# Patient Record
Sex: FEMALE | Race: WHITE
[De-identification: names, ages, dates, MRNs, and addresses within clinical notes are randomized per-mention and may not be internally consistent; named-entity substitution may affect disease eponyms.]

---

## 2018-02-13 ENCOUNTER — HOSPITAL ENCOUNTER (EMERGENCY)
Dept: HOSPITAL 47 - EC | Age: 53
Discharge: HOME | End: 2018-02-13
Payer: COMMERCIAL

## 2018-02-13 VITALS — RESPIRATION RATE: 16 BRPM | DIASTOLIC BLOOD PRESSURE: 67 MMHG | HEART RATE: 94 BPM | SYSTOLIC BLOOD PRESSURE: 158 MMHG

## 2018-02-13 VITALS — TEMPERATURE: 98.2 F

## 2018-02-13 DIAGNOSIS — E11.9: ICD-10-CM

## 2018-02-13 DIAGNOSIS — Z79.899: ICD-10-CM

## 2018-02-13 DIAGNOSIS — Z87.891: ICD-10-CM

## 2018-02-13 DIAGNOSIS — Z79.4: ICD-10-CM

## 2018-02-13 DIAGNOSIS — I10: ICD-10-CM

## 2018-02-13 DIAGNOSIS — E78.5: ICD-10-CM

## 2018-02-13 DIAGNOSIS — Z88.2: ICD-10-CM

## 2018-02-13 DIAGNOSIS — M94.0: Primary | ICD-10-CM

## 2018-02-13 DIAGNOSIS — Z86.14: ICD-10-CM

## 2018-02-13 LAB
ALBUMIN SERPL-MCNC: 3.9 G/DL (ref 3.5–5)
ALP SERPL-CCNC: 129 U/L (ref 38–126)
ALT SERPL-CCNC: 27 U/L (ref 9–52)
ANION GAP SERPL CALC-SCNC: 12 MMOL/L
APTT BLD: 22.3 SEC (ref 22–30)
AST SERPL-CCNC: 28 U/L (ref 14–36)
BASOPHILS # BLD AUTO: 0.1 K/UL (ref 0–0.2)
BASOPHILS NFR BLD AUTO: 0 %
BUN SERPL-SCNC: 22 MG/DL (ref 7–17)
CALCIUM SPEC-MCNC: 10 MG/DL (ref 8.4–10.2)
CHLORIDE SERPL-SCNC: 96 MMOL/L (ref 98–107)
CK SERPL-CCNC: 28 U/L (ref 30–135)
CO2 SERPL-SCNC: 32 MMOL/L (ref 22–30)
EOSINOPHIL # BLD AUTO: 0.7 K/UL (ref 0–0.7)
EOSINOPHIL NFR BLD AUTO: 6 %
ERYTHROCYTE [DISTWIDTH] IN BLOOD BY AUTOMATED COUNT: 4.6 M/UL (ref 3.8–5.4)
ERYTHROCYTE [DISTWIDTH] IN BLOOD: 14.7 % (ref 11.5–15.5)
GLUCOSE SERPL-MCNC: 332 MG/DL (ref 74–99)
HCT VFR BLD AUTO: 38.9 % (ref 34–46)
HGB BLD-MCNC: 12 GM/DL (ref 11.4–16)
INR PPP: 1 (ref ?–1.2)
LYMPHOCYTES # SPEC AUTO: 2.4 K/UL (ref 1–4.8)
LYMPHOCYTES NFR SPEC AUTO: 23 %
MAGNESIUM SPEC-SCNC: 1.9 MG/DL (ref 1.6–2.3)
MCH RBC QN AUTO: 26.1 PG (ref 25–35)
MCHC RBC AUTO-ENTMCNC: 30.9 G/DL (ref 31–37)
MCV RBC AUTO: 84.5 FL (ref 80–100)
MONOCYTES # BLD AUTO: 0.6 K/UL (ref 0–1)
MONOCYTES NFR BLD AUTO: 6 %
NEUTROPHILS # BLD AUTO: 6.9 K/UL (ref 1.3–7.7)
NEUTROPHILS NFR BLD AUTO: 64 %
PLATELET # BLD AUTO: 452 K/UL (ref 150–450)
POTASSIUM SERPL-SCNC: 5.9 MMOL/L (ref 3.5–5.1)
PROT SERPL-MCNC: 7.1 G/DL (ref 6.3–8.2)
PT BLD: 9.5 SEC (ref 9–12)
SODIUM SERPL-SCNC: 140 MMOL/L (ref 137–145)
TROPONIN I SERPL-MCNC: <0.012 NG/ML (ref 0–0.03)
WBC # BLD AUTO: 10.8 K/UL (ref 3.8–10.6)

## 2018-02-13 PROCEDURE — 85025 COMPLETE CBC W/AUTO DIFF WBC: CPT

## 2018-02-13 PROCEDURE — 84484 ASSAY OF TROPONIN QUANT: CPT

## 2018-02-13 PROCEDURE — 93005 ELECTROCARDIOGRAM TRACING: CPT

## 2018-02-13 PROCEDURE — 71046 X-RAY EXAM CHEST 2 VIEWS: CPT

## 2018-02-13 PROCEDURE — 85610 PROTHROMBIN TIME: CPT

## 2018-02-13 PROCEDURE — 85730 THROMBOPLASTIN TIME PARTIAL: CPT

## 2018-02-13 PROCEDURE — 83735 ASSAY OF MAGNESIUM: CPT

## 2018-02-13 PROCEDURE — 99285 EMERGENCY DEPT VISIT HI MDM: CPT

## 2018-02-13 PROCEDURE — 82553 CREATINE MB FRACTION: CPT

## 2018-02-13 PROCEDURE — 36415 COLL VENOUS BLD VENIPUNCTURE: CPT

## 2018-02-13 PROCEDURE — 80053 COMPREHEN METABOLIC PANEL: CPT

## 2018-02-13 PROCEDURE — 82550 ASSAY OF CK (CPK): CPT

## 2018-02-13 NOTE — XR
EXAMINATION TYPE: XR chest 2V

 

DATE OF EXAM: 2/13/2018

 

COMPARISON: 2/25/2015

 

HISTORY: Chest pain

 

TECHNIQUE:  Frontal and lateral views of the chest are obtained.

 

FINDINGS:  There is consolidation in the left lower lobe with blunting of left costophrenic angle. Th
ere is slight blunting of right costophrenic angle. There is no gross heart failure. There are chest 
leads. Heart appears enlarged. There is cervical spine fusion surgery. There are chest leads.

 

IMPRESSION:  There is new left lower lobe pulmonary consolidation compared to old exam and consistent
 with pneumonia. New bilateral pleural effusions. Mild heart failure cannot be entirely excluded.

## 2020-09-03 ENCOUNTER — HOSPITAL ENCOUNTER (OUTPATIENT)
Dept: HOSPITAL 47 - LABWHC1 | Age: 55
Discharge: HOME | End: 2020-09-03
Payer: MEDICARE

## 2020-09-03 DIAGNOSIS — N19: ICD-10-CM

## 2020-09-03 DIAGNOSIS — K90.89: ICD-10-CM

## 2020-09-03 DIAGNOSIS — K74.1: ICD-10-CM

## 2020-09-03 DIAGNOSIS — E66.01: ICD-10-CM

## 2020-09-03 DIAGNOSIS — D50.8: ICD-10-CM

## 2020-09-03 DIAGNOSIS — K50.90: ICD-10-CM

## 2020-09-03 DIAGNOSIS — E89.1: Primary | ICD-10-CM

## 2020-09-03 DIAGNOSIS — E55.9: ICD-10-CM

## 2020-09-03 LAB
ALBUMIN SERPL-MCNC: 4.2 G/DL (ref 3.8–4.9)
ALBUMIN/GLOB SERPL: 1.83 G/DL (ref 1.6–3.17)
ALP SERPL-CCNC: 101 U/L (ref 41–126)
ALT SERPL-CCNC: 27 U/L (ref 8–44)
ANION GAP SERPL CALC-SCNC: 11.5 MMOL/L (ref 4–12)
APTT BLD: 26.9 SEC (ref 24.7–29.9)
AST SERPL-CCNC: 32 U/L (ref 13–35)
BUN SERPL-SCNC: 18 MG/DL (ref 9–27)
BUN/CREAT SERPL: 20 RATIO (ref 12–20)
CALCIUM SPEC-MCNC: 9.3 MG/DL (ref 8.7–10.3)
CHLORIDE SERPL-SCNC: 103 MMOL/L (ref 96–109)
CHOLEST SERPL-MCNC: 160 MG/DL (ref 0–200)
CO2 SERPL-SCNC: 28.5 MMOL/L (ref 21.6–31.8)
ERYTHROCYTE [DISTWIDTH] IN BLOOD BY AUTOMATED COUNT: 4.6 M/UL (ref 3.8–5.4)
ERYTHROCYTE [DISTWIDTH] IN BLOOD: 16.9 % (ref 11.5–15.5)
FERRITIN SERPL-MCNC: 91.3 NG/ML (ref 10–291)
GLOBULIN SER CALC-MCNC: 2.3 G/DL (ref 1.6–3.3)
GLUCOSE SERPL-MCNC: 154 MG/DL (ref 70–110)
HCT VFR BLD AUTO: 38.6 % (ref 34–46)
HDLC SERPL-MCNC: 57 MG/DL (ref 40–60)
HGB BLD-MCNC: 12 GM/DL (ref 11.4–16)
INR PPP: 0.92 (ref 0.9–1.11)
IRON SERPL-MCNC: 38 UG/DL (ref 50–170)
LDLC SERPL CALC-MCNC: 75.8 MG/DL (ref 0–131)
MAGNESIUM SPEC-SCNC: 1.9 MG/DL (ref 1.5–2.4)
MCH RBC QN AUTO: 26.2 PG (ref 25–35)
MCHC RBC AUTO-ENTMCNC: 31.2 G/DL (ref 31–37)
MCV RBC AUTO: 83.8 FL (ref 80–100)
PLATELET # BLD AUTO: 287 K/UL (ref 150–450)
POTASSIUM SERPL-SCNC: 4.6 MMOL/L (ref 3.5–5.5)
PREALB SERPL-MCNC: 21 MG/DL (ref 18–42)
PROT SERPL-MCNC: 6.5 G/DL (ref 6.2–8.2)
PT BLD: 9.9 SEC (ref 9.9–11.9)
SODIUM SERPL-SCNC: 143 MMOL/L (ref 135–145)
TIBC SERPL-MCNC: 322 UG/DL (ref 228–460)
TRIGL SERPL-MCNC: 136 MG/DL (ref 0–149)
VIT B12 SERPL-MCNC: 607 PG/ML (ref 200–944)
VLDLC SERPL CALC-MCNC: 27.2 MG/DL (ref 5–40)
WBC # BLD AUTO: 13.3 K/UL (ref 3.8–10.6)

## 2020-09-03 PROCEDURE — 82746 ASSAY OF FOLIC ACID SERUM: CPT

## 2020-09-03 PROCEDURE — 84255 ASSAY OF SELENIUM: CPT

## 2020-09-03 PROCEDURE — 84443 ASSAY THYROID STIM HORMONE: CPT

## 2020-09-03 PROCEDURE — 83540 ASSAY OF IRON: CPT

## 2020-09-03 PROCEDURE — 80053 COMPREHEN METABOLIC PANEL: CPT

## 2020-09-03 PROCEDURE — 84100 ASSAY OF PHOSPHORUS: CPT

## 2020-09-03 PROCEDURE — 85027 COMPLETE CBC AUTOMATED: CPT

## 2020-09-03 PROCEDURE — 82306 VITAMIN D 25 HYDROXY: CPT

## 2020-09-03 PROCEDURE — 82607 VITAMIN B-12: CPT

## 2020-09-03 PROCEDURE — 36415 COLL VENOUS BLD VENIPUNCTURE: CPT

## 2020-09-03 PROCEDURE — 82728 ASSAY OF FERRITIN: CPT

## 2020-09-03 PROCEDURE — 85610 PROTHROMBIN TIME: CPT

## 2020-09-03 PROCEDURE — 84134 ASSAY OF PREALBUMIN: CPT

## 2020-09-03 PROCEDURE — 93005 ELECTROCARDIOGRAM TRACING: CPT

## 2020-09-03 PROCEDURE — 84590 ASSAY OF VITAMIN A: CPT

## 2020-09-03 PROCEDURE — 84630 ASSAY OF ZINC: CPT

## 2020-09-03 PROCEDURE — 82525 ASSAY OF COPPER: CPT

## 2020-09-03 PROCEDURE — 84425 ASSAY OF VITAMIN B-1: CPT

## 2020-09-03 PROCEDURE — 80061 LIPID PANEL: CPT

## 2020-09-03 PROCEDURE — 85730 THROMBOPLASTIN TIME PARTIAL: CPT

## 2020-09-03 PROCEDURE — 83970 ASSAY OF PARATHORMONE: CPT

## 2020-09-03 PROCEDURE — 83550 IRON BINDING TEST: CPT

## 2020-09-03 PROCEDURE — 83735 ASSAY OF MAGNESIUM: CPT

## 2020-09-04 LAB
VIT B1 BLD-MCNC: 82 UG/L (ref 38–122)
ZINC SERPL-MCNC: 50 UG/DL (ref 60–130)

## 2020-09-11 ENCOUNTER — HOSPITAL ENCOUNTER (OUTPATIENT)
Dept: HOSPITAL 47 - RADUSWWP | Age: 55
Discharge: HOME | End: 2020-09-11
Payer: MEDICARE

## 2020-09-11 DIAGNOSIS — R13.10: Primary | ICD-10-CM

## 2020-09-11 PROCEDURE — 74220 X-RAY XM ESOPHAGUS 1CNTRST: CPT

## 2020-09-11 NOTE — FL
Barium swallow

 

history: Dysphagia

 

0.47 minutes fluoroscopy time. 7 images obtained.

 

Patient was given high density barium to drink.

 

The swallowing mechanism is normal. There is no extrinsic or intrinsic esophageal lesion. No hiatal h
ernia or gastroesophageal reflux.

 

IMPRESSION: Normal barium swallow.

## 2020-11-09 ENCOUNTER — HOSPITAL ENCOUNTER (OUTPATIENT)
Dept: HOSPITAL 47 - BARWHC3 | Age: 55
Discharge: HOME | End: 2020-11-09
Payer: MEDICARE

## 2020-11-09 VITALS — BODY MASS INDEX: 59.2 KG/M2

## 2020-11-09 DIAGNOSIS — Z71.3: ICD-10-CM

## 2020-11-09 DIAGNOSIS — E66.01: Primary | ICD-10-CM

## 2020-11-09 PROCEDURE — 97804 MEDICAL NUTRITION GROUP: CPT

## 2021-01-13 ENCOUNTER — HOSPITAL ENCOUNTER (OUTPATIENT)
Dept: HOSPITAL 47 - BARWHC3 | Age: 56
Discharge: HOME | End: 2021-01-13
Attending: SURGERY
Payer: MEDICARE

## 2021-01-13 VITALS — BODY MASS INDEX: 56.4 KG/M2

## 2021-01-13 VITALS
RESPIRATION RATE: 18 BRPM | HEART RATE: 88 BPM | DIASTOLIC BLOOD PRESSURE: 63 MMHG | SYSTOLIC BLOOD PRESSURE: 166 MMHG | TEMPERATURE: 98.1 F

## 2021-01-13 DIAGNOSIS — Z79.82: ICD-10-CM

## 2021-01-13 DIAGNOSIS — Z88.2: ICD-10-CM

## 2021-01-13 DIAGNOSIS — E66.1: Primary | ICD-10-CM

## 2021-01-13 DIAGNOSIS — D72.829: ICD-10-CM

## 2021-01-13 DIAGNOSIS — E11.40: ICD-10-CM

## 2021-01-13 DIAGNOSIS — E78.5: ICD-10-CM

## 2021-01-13 DIAGNOSIS — R11.2: ICD-10-CM

## 2021-01-13 DIAGNOSIS — D50.9: ICD-10-CM

## 2021-01-13 DIAGNOSIS — M47.9: ICD-10-CM

## 2021-01-13 DIAGNOSIS — Z79.1: ICD-10-CM

## 2021-01-13 DIAGNOSIS — Z79.891: ICD-10-CM

## 2021-01-13 DIAGNOSIS — E60: ICD-10-CM

## 2021-01-13 DIAGNOSIS — F32.9: ICD-10-CM

## 2021-01-13 DIAGNOSIS — Z86.14: ICD-10-CM

## 2021-01-13 DIAGNOSIS — M17.0: ICD-10-CM

## 2021-01-13 DIAGNOSIS — Z90.49: ICD-10-CM

## 2021-01-13 DIAGNOSIS — E11.9: ICD-10-CM

## 2021-01-13 DIAGNOSIS — Z79.899: ICD-10-CM

## 2021-01-13 DIAGNOSIS — Z79.4: ICD-10-CM

## 2021-01-13 DIAGNOSIS — G47.33: ICD-10-CM

## 2021-01-13 DIAGNOSIS — Z90.710: ICD-10-CM

## 2021-01-13 DIAGNOSIS — K21.9: ICD-10-CM

## 2021-01-13 DIAGNOSIS — E55.9: ICD-10-CM

## 2021-01-13 PROCEDURE — 99211 OFF/OP EST MAY X REQ PHY/QHP: CPT

## 2021-01-13 NOTE — P.PN
Subjective


Progress Note Date: 01/13/21








DATE OF SERVICE: 01/13/2021





CHIEF COMPLAINT: Morbid obesity





HISTORY OF PRESENT ILLNESS:  Melita Villegas is a 55-year-old female who comes 

with lifelong morbid obesity.  She is looking into the sleeve gastrectomy. She 

has completed medical supervised weight loss with moderate improvement of her 

blood sugar glucose to 117 in the mornings. She has lost weight. She has 

completed cardiac risk assessment. She is excited for her weight loss procedure.

She is taking her multivitamin. She has completed psych assessment and medical 

risk assessment.





At height of 5 feet 5 inches, her ideal body weight is 149 pounds.  She comes in

338 pounds from 346 pounds, 4 months ago. She has lost 8 pounds in 4 months.  

Her body mass index is down from 57.7 to 56.4.  She is 189 pounds overweight.





PAST MEDICAL HISTORY: 


1.  Morbid obesity due to excess calories


2.  Body mass index of 57.7, initial


3.  Gastroesophageal reflux disease


4.  Obstructive sleep apnea


5.  Hyperlipidemia


6.  Diabetes type 2, insulin dependent


7.  Osteoarthritis of the knees


8.  Osteoarthritis of the back


9.  Neuropathy


10.  Depressive disorder


11.  History of MRSA infection


12.  Post op nausea and vomiting





PAST SURGICAL HISTORY: 


1.  Cholecystectomy


2.  Hysterectomy


3.  Cervical rodding


4.  Bilateral retinal repair





HOME MEDICATIONS: 


                                Home Medications











 Medication  Instructions  Recorded  Confirmed


 


Atorvastatin [Lipitor] 20 mg PO HS 02/13/18 11/11/20


 


Gabapentin [Neurontin] 400 mg PO TID 02/13/18 11/11/20


 


Insulin Regular, Human [humulin R 90 unit SQ AC-TID 02/13/18 11/11/20





U-500 Kwikpen]   


 


Montelukast [Singulair] 10 mg PO HS 02/13/18 11/11/20


 


Pantoprazole Sodium [Protonix] 40 mg PO DAILY 02/13/18 11/11/20


 


amLODIPine [Norvasc] 10 mg PO DAILY 02/13/18 11/11/20


 


Canagliflozin [Invokana] 300 mg PO DAILY 09/02/20 11/11/20


 


Ibuprofen [Motrin] 800 mg PO Q6H 09/02/20 11/11/20


 


Ketoconazole 2% Cream [Nizoral 2%] 1 unit TOPICAL BID 09/02/20 11/11/20


 


Meloxicam [Mobic] 7.5 mg PO DAILY 09/02/20 11/11/20


 


Mirabegron [Myrbetriq] 50 mg PO DAILY 09/02/20 11/11/20


 


PARoxetine HCL 30 mg PO DAILY 09/02/20 11/11/20


 


Semaglutide [Ozempic] 1 mg SQ WEEKLY 09/02/20 11/11/20


 


metFORMIN HCL [metFORMIN HCL ER] 500 mg PO BID 09/02/20 11/11/20


 


Ergocalciferol [Vitamin D2 5,000 unit PO WEEKLY 09/04/20 11/11/20





(DRISDOL)]   


 


Iron 64 mg PO BID 09/04/20 11/11/20








                                  Previous Rx's











 Medication  Instructions  Recorded


 


Aspirin 81 mg PO DAILY #1 chewable 02/26/15











ALLERGIES:


                                    Allergies











Allergy/AdvReac Type Severity Reaction Status Date / Time


 


Sulfa (Sulfonamide Allergy  Anaphylaxis Verified 02/13/18 18:58





Antibiotics)     











SOCIAL HISTORY: Past tobacco use.   





FAMILY HISTORY: No family history of ulcerative colitis disease or Crohn's 

disease. Family history of morbid obesity. No lupus in the family.  No reports 

of stomach or esophageal cancer.  





REVIEW OF ORGAN SYSTEMS: 


CONSTITUTIONAL: At height of 5 feet 5 inches, her ideal body weight is 149 

pounds.  Her highest weight is 346 pounds.  Her body mass index is 57.7.  She is

197 pounds overweight.


HEENT: Denies any active troubles with vision or hearing.  Has troubles with 

swallowing.  


ENDOCRINE: Has diabetes. No hypothyroidism. 


CARDIOVASCULAR: Past reports of palpitations or heart attacks or chest pain.  

Has hyperlipidemia. Has hypertension. 


RESPIRATORY: Has daytime somnolence. Has asthma. 


GASTROINTESTINAL: Denies any bright red blood per rectum.  No diarrhea. No 

constipation. Has gastroesophageal reflux disease. 


MUSCULOSKELETAL: Has lower back pain and joint pain.  Has osteoarthritis of the 

knees.  


NEURO: No headaches. No seizure disorders.  Has neuropathy. 


PSYCH: Has depression. No suicidal ideation.   


RHEUMATOLOGIC: No lupus.  No rheumatoid arthritis.  


HEMATOLOGIC: Denies any abnormal bleeding or bruising.  No personal history of 

DVTs.


SKIN: No rash.  No skin cancer.





PHYSICAL EXAM: 


VITAL SIGNS: Height 5 foot 5 inches, weight 338 pounds. BMI 56.4


                                   Vital Signs











Temp  98.1 F   01/13/21 15:11


 


Pulse  88   01/13/21 15:11


 


Resp  18   01/13/21 15:11


 


BP  166/63   01/13/21 15:11


 


Pulse Ox      











GENERAL: Well-developed in no acute distress.  


HEENT: No scleral icterus.  Extraocular movements grossly intact.  Hears 

conversational speech.  No nasal drainage.


NECK: Supple without lymphadenopathy. 


CHEST: Nonlabored respirations with equal bilateral excursions. 


CARDIOVASCULAR: Regular rate and regular rhythm. Distal 2+ pulses. 


ABDOMEN: Obese, soft, nontender, nondistended. 


MUSCULOSKELETAL: No clubbing, cyanosis. 


NEURO: No focal or lateralizing signs. Cranial nerves 2 through 12 grossly 

within normal limits. 


PSYCH: Appropriate affect. Alert and oriented to person, place and time.


SKIN: Good skin turgor.  Well perfused.





LABS: WBC elevated. Iron is low. Vitamin D is low. Zinc is low.





EKG: Left atrial enlargement





STUDIES: Barium swallow independently reviewed with evidence of intra-esophageal

reflux disease. No large hiatal hernia identified. 





ASSESSMENT: 


1.  Morbid obesity due to excess calories


2.  Body mass index of 57.7, initial


3.  Gastroesophageal reflux disease


4.  Obstructive sleep apnea


5.  Hyperlipidemia


6.  Diabetes type 2, insulin dependent


7.  Osteoarthritis of the knees


8.  Osteoarthritis of the back


9.  Neuropathy


10.  Depressive disorder


11.  History of MRSA infection


12.  Post op nausea and vomiting


13.  Leukocystosis


14.  Iron deficiency anemia


15.  Vitamin D deficiency


16.  Zinc deficiency.





PLAN: 


1.  Bariatric options between a sleeve, band and a Ana Maria-en-Y gastric bypass were

reviewed in detail. The patient elected for a sleeve gastrectomy.  Robotic 

assisted approach described.  


2. The [place] [facility] Data was also reviewed with benefits and

risks as described.  


3. An 8 page second-generation bariatric consent form was reviewed in detail 

including potential of bleeding, infection, leaks, adequate weight loss, 

nutritional deficiencies which the patient demonstrated understanding of the 

risks.


4. A 2 week high-protein low caloric 800 kcal diet described to address 

hepatomegaly.


5.  Preoperative labs including complete metabolic panel and CBC with type and 

screen recommended.


6.  DVT prophylaxis per [place] [facility]ariat[facility] surgery collaborative.


7.  Antibiotic prophylaxis.


8.  Inpatient hospitalization anticipated for more than 2 nights.


9.  All questions and concerns were addressed with the patient.


10.  She is at elevated risk for perioperative complications with pre-existing 

heart disease and BMI over 50.


11.  Overall, patient has expressed understanding of bariatric care including 

postoperative diet and commitment of lifestyle.  Patient should benefit from 

surgical intervention for correction of her morbid obesity.


12. Vitamin D 50,000 units weekly advised


13. Zinc 50 mg daily advised


14. She is taking iron supplements


15. Monitor WBC. Persistent elevated will need referral to hematologist.


16. Cardiac risk assessment was obtained for abnormal EKG.





Objective





- Vital Signs


Vital signs: 


                                   Vital Signs











Temp  98.1 F   01/13/21 15:11


 


Pulse  88   01/13/21 15:11


 


Resp  18   01/13/21 15:11


 


BP  166/63   01/13/21 15:11


 


Pulse Ox      








                                 Intake & Output











 01/12/21 01/13/21 01/13/21





 18:59 06:59 18:59


 


Weight   153.768 kg

## 2021-01-15 ENCOUNTER — HOSPITAL ENCOUNTER (OUTPATIENT)
Dept: HOSPITAL 47 - LABPAT | Age: 56
Discharge: HOME | End: 2021-01-15
Attending: SURGERY
Payer: MEDICARE

## 2021-01-15 DIAGNOSIS — Z01.818: Primary | ICD-10-CM

## 2021-01-15 LAB
ALBUMIN SERPL-MCNC: 4.1 G/DL (ref 3.5–5)
ALP SERPL-CCNC: 113 U/L (ref 38–126)
ALT SERPL-CCNC: 28 U/L (ref 4–34)
ANION GAP SERPL CALC-SCNC: 15 MMOL/L
AST SERPL-CCNC: 39 U/L (ref 14–36)
BASOPHILS # BLD AUTO: 0.1 K/UL (ref 0–0.2)
BASOPHILS NFR BLD AUTO: 0 %
BUN SERPL-SCNC: 25 MG/DL (ref 7–17)
CALCIUM SPEC-MCNC: 9.2 MG/DL (ref 8.4–10.2)
CHLORIDE SERPL-SCNC: 102 MMOL/L (ref 98–107)
CO2 SERPL-SCNC: 20 MMOL/L (ref 22–30)
EOSINOPHIL # BLD AUTO: 0.3 K/UL (ref 0–0.7)
EOSINOPHIL NFR BLD AUTO: 2 %
ERYTHROCYTE [DISTWIDTH] IN BLOOD BY AUTOMATED COUNT: 5.12 M/UL (ref 3.8–5.4)
ERYTHROCYTE [DISTWIDTH] IN BLOOD: 17 % (ref 11.5–15.5)
GLUCOSE SERPL-MCNC: 129 MG/DL (ref 74–99)
HCT VFR BLD AUTO: 41.5 % (ref 34–46)
HGB BLD-MCNC: 13.9 GM/DL (ref 11.4–16)
LYMPHOCYTES # SPEC AUTO: 2.5 K/UL (ref 1–4.8)
LYMPHOCYTES NFR SPEC AUTO: 17 %
MCH RBC QN AUTO: 27 PG (ref 25–35)
MCHC RBC AUTO-ENTMCNC: 33.3 G/DL (ref 31–37)
MCV RBC AUTO: 81.1 FL (ref 80–100)
MONOCYTES # BLD AUTO: 0.6 K/UL (ref 0–1)
MONOCYTES NFR BLD AUTO: 4 %
NEUTROPHILS # BLD AUTO: 10.9 K/UL (ref 1.3–7.7)
NEUTROPHILS NFR BLD AUTO: 75 %
PLATELET # BLD AUTO: 287 K/UL (ref 150–450)
POTASSIUM SERPL-SCNC: 4.6 MMOL/L (ref 3.5–5.1)
PROT SERPL-MCNC: 7.3 G/DL (ref 6.3–8.2)
SODIUM SERPL-SCNC: 137 MMOL/L (ref 137–145)
WBC # BLD AUTO: 14.4 K/UL (ref 3.8–10.6)

## 2021-01-15 PROCEDURE — 80053 COMPREHEN METABOLIC PANEL: CPT

## 2021-01-15 PROCEDURE — 36415 COLL VENOUS BLD VENIPUNCTURE: CPT

## 2021-01-15 PROCEDURE — 85025 COMPLETE CBC W/AUTO DIFF WBC: CPT

## 2021-01-20 ENCOUNTER — HOSPITAL ENCOUNTER (OUTPATIENT)
Dept: HOSPITAL 47 - BARWHC3 | Age: 56
Discharge: HOME | End: 2021-01-20
Attending: SURGERY
Payer: MEDICARE

## 2021-01-20 VITALS — BODY MASS INDEX: 56 KG/M2

## 2021-01-20 VITALS
DIASTOLIC BLOOD PRESSURE: 73 MMHG | HEART RATE: 89 BPM | SYSTOLIC BLOOD PRESSURE: 148 MMHG | TEMPERATURE: 98.2 F | RESPIRATION RATE: 18 BRPM

## 2021-01-20 DIAGNOSIS — D50.9: ICD-10-CM

## 2021-01-20 DIAGNOSIS — D72.829: ICD-10-CM

## 2021-01-20 DIAGNOSIS — E60: ICD-10-CM

## 2021-01-20 DIAGNOSIS — Z86.14: ICD-10-CM

## 2021-01-20 DIAGNOSIS — E11.40: ICD-10-CM

## 2021-01-20 DIAGNOSIS — M17.0: ICD-10-CM

## 2021-01-20 DIAGNOSIS — K21.9: ICD-10-CM

## 2021-01-20 DIAGNOSIS — E55.9: ICD-10-CM

## 2021-01-20 DIAGNOSIS — G47.33: ICD-10-CM

## 2021-01-20 DIAGNOSIS — F32.9: ICD-10-CM

## 2021-01-20 DIAGNOSIS — E78.5: ICD-10-CM

## 2021-01-20 DIAGNOSIS — R11.2: ICD-10-CM

## 2021-01-20 DIAGNOSIS — Z79.2: ICD-10-CM

## 2021-01-20 DIAGNOSIS — E66.01: Primary | ICD-10-CM

## 2021-01-20 PROCEDURE — 99211 OFF/OP EST MAY X REQ PHY/QHP: CPT

## 2021-01-20 NOTE — P.PN
Subjective


Progress Note Date: 01/20/21








DATE OF SERVICE: 01/20/2021





CHIEF COMPLAINT: Morbid obesity





HISTORY OF PRESENT ILLNESS:  Melita Villegas is a 55-year-old female who comes 

in looking into the sleeve gastrectomy. She comes in with new pain along her 

pannus. She has persistently high WBC getting worse over 3+ years. She saw her 

primary care provider and was prescribed Nystatin and cefuroxime for severe 

panniculitis. She has an active infection. 





At height of 5 feet 5 inches, her ideal body weight is 149 pounds.  She comes in

336 pounds from 338 pounds, 1 week ago. She has lost 2 pounds in 1 week.  Her 

body mass index is down from 57.7 to 56.1.  She is 187 pounds overweight.





PHYSICAL EXAM: 


VITAL SIGNS: Height 5 foot 5 inches, weight 336 pounds. BMI 56.1


                                   Vital Signs











Temp  98.2 F   01/20/21 15:14


 


Pulse  89   01/20/21 15:14


 


Resp  18   01/20/21 15:14


 


BP  148/73   01/20/21 15:14


 


Pulse Ox      














GENERAL: Well-developed in no acute distress.  


HEENT: No scleral icterus.  Extraocular movements grossly intact.  Hears 

conversational speech.  No nasal drainage.


NECK: Supple without lymphadenopathy. 


CHEST: Nonlabored respirations with equal bilateral excursions. 


CARDIOVASCULAR: Regular rate and regular rhythm. Distal 2+ pulses. 


ABDOMEN: Obese, soft, nontender, nondistended. Severe panniculitis


MUSCULOSKELETAL: No clubbing, cyanosis. 


NEURO: No focal or lateralizing signs. Cranial nerves 2 through 12 grossly 

within normal limits. 


PSYCH: Appropriate affect. Alert and oriented to person, place and time.


SKIN: Good skin turgor.  Well perfused.





LABS: WBC elevated over 14.4. AST is elevated. 





ASSESSMENT: 


1.  Morbid obesity due to excess calories


2.  Body mass index of 57.7, initial


3.  Gastroesophageal reflux disease


4.  Obstructive sleep apnea


5.  Hyperlipidemia


6.  Diabetes type 2, insulin dependent


7.  Osteoarthritis of the knees


8.  Osteoarthritis of the back


9.  Neuropathy


10.  Depressive disorder


11.  History of MRSA infection


12.  Post op nausea and vomiting


13.  Leukocystosis


14.  Iron deficiency anemia


15.  Vitamin D deficiency


16.  Zinc deficiency.





PLAN: 


1. She has an active infection and recommend continued antibiotics.


2. Recommend Dr. Vernon assessment for persistent leukocytosis over 3+ years. 














Objective





- Vital Signs


Vital signs: 


                                   Vital Signs











Temp  98.2 F   01/20/21 15:14


 


Pulse  89   01/20/21 15:14


 


Resp  18   01/20/21 15:14


 


BP  148/73   01/20/21 15:14


 


Pulse Ox      








                                 Intake & Output











 01/19/21 01/20/21 01/20/21





 18:59 06:59 18:59


 


Weight   152.861 kg

## 2021-02-26 ENCOUNTER — HOSPITAL ENCOUNTER (OUTPATIENT)
Dept: HOSPITAL 47 - LABPAT | Age: 56
Discharge: HOME | End: 2021-02-26
Attending: SURGERY
Payer: MEDICARE

## 2021-02-26 DIAGNOSIS — Z01.818: Primary | ICD-10-CM

## 2021-02-26 LAB
BASOPHILS # BLD AUTO: 0 K/UL (ref 0–0.2)
BASOPHILS NFR BLD AUTO: 0 %
BUN SERPL-SCNC: 19 MG/DL (ref 7–17)
EOSINOPHIL # BLD AUTO: 0.4 K/UL (ref 0–0.7)
EOSINOPHIL NFR BLD AUTO: 3 %
ERYTHROCYTE [DISTWIDTH] IN BLOOD BY AUTOMATED COUNT: 5.05 M/UL (ref 3.8–5.4)
ERYTHROCYTE [DISTWIDTH] IN BLOOD: 17.8 % (ref 11.5–15.5)
HCT VFR BLD AUTO: 41.6 % (ref 34–46)
HGB BLD-MCNC: 13.7 GM/DL (ref 11.4–16)
LYMPHOCYTES # SPEC AUTO: 2.5 K/UL (ref 1–4.8)
LYMPHOCYTES NFR SPEC AUTO: 19 %
MCH RBC QN AUTO: 27.2 PG (ref 25–35)
MCHC RBC AUTO-ENTMCNC: 33 G/DL (ref 31–37)
MCV RBC AUTO: 82.4 FL (ref 80–100)
MONOCYTES # BLD AUTO: 0.5 K/UL (ref 0–1)
MONOCYTES NFR BLD AUTO: 4 %
NEUTROPHILS # BLD AUTO: 9.7 K/UL (ref 1.3–7.7)
NEUTROPHILS NFR BLD AUTO: 73 %
PLATELET # BLD AUTO: 270 K/UL (ref 150–450)
POTASSIUM SERPL-SCNC: 4.3 MMOL/L (ref 3.5–5.1)
WBC # BLD AUTO: 13.2 K/UL (ref 3.8–10.6)

## 2021-02-26 PROCEDURE — 85025 COMPLETE CBC W/AUTO DIFF WBC: CPT

## 2021-02-26 PROCEDURE — 84132 ASSAY OF SERUM POTASSIUM: CPT

## 2021-02-26 PROCEDURE — 82565 ASSAY OF CREATININE: CPT

## 2021-02-26 PROCEDURE — 36415 COLL VENOUS BLD VENIPUNCTURE: CPT

## 2021-02-26 PROCEDURE — 84520 ASSAY OF UREA NITROGEN: CPT

## 2021-03-01 ENCOUNTER — HOSPITAL ENCOUNTER (INPATIENT)
Dept: HOSPITAL 47 - 2ORMAIN | Age: 56
LOS: 2 days | Discharge: HOME | DRG: 621 | End: 2021-03-03
Attending: SURGERY | Admitting: SURGERY
Payer: MEDICARE

## 2021-03-01 VITALS — BODY MASS INDEX: 51.1 KG/M2

## 2021-03-01 DIAGNOSIS — E78.5: ICD-10-CM

## 2021-03-01 DIAGNOSIS — E66.01: Primary | ICD-10-CM

## 2021-03-01 DIAGNOSIS — Z87.891: ICD-10-CM

## 2021-03-01 DIAGNOSIS — Z83.49: ICD-10-CM

## 2021-03-01 DIAGNOSIS — Z82.49: ICD-10-CM

## 2021-03-01 DIAGNOSIS — Z86.14: ICD-10-CM

## 2021-03-01 DIAGNOSIS — Z80.49: ICD-10-CM

## 2021-03-01 DIAGNOSIS — G47.33: ICD-10-CM

## 2021-03-01 DIAGNOSIS — Z96.1: ICD-10-CM

## 2021-03-01 DIAGNOSIS — E55.9: ICD-10-CM

## 2021-03-01 DIAGNOSIS — E11.319: ICD-10-CM

## 2021-03-01 DIAGNOSIS — Z90.49: ICD-10-CM

## 2021-03-01 DIAGNOSIS — F40.240: ICD-10-CM

## 2021-03-01 DIAGNOSIS — F32.9: ICD-10-CM

## 2021-03-01 DIAGNOSIS — M51.36: ICD-10-CM

## 2021-03-01 DIAGNOSIS — E11.42: ICD-10-CM

## 2021-03-01 DIAGNOSIS — Z79.899: ICD-10-CM

## 2021-03-01 DIAGNOSIS — K21.9: ICD-10-CM

## 2021-03-01 DIAGNOSIS — Z98.41: ICD-10-CM

## 2021-03-01 DIAGNOSIS — M47.9: ICD-10-CM

## 2021-03-01 DIAGNOSIS — Z98.42: ICD-10-CM

## 2021-03-01 DIAGNOSIS — H54.8: ICD-10-CM

## 2021-03-01 DIAGNOSIS — Z98.890: ICD-10-CM

## 2021-03-01 DIAGNOSIS — Z88.2: ICD-10-CM

## 2021-03-01 DIAGNOSIS — M17.0: ICD-10-CM

## 2021-03-01 DIAGNOSIS — Z79.82: ICD-10-CM

## 2021-03-01 DIAGNOSIS — B37.9: ICD-10-CM

## 2021-03-01 DIAGNOSIS — D50.9: ICD-10-CM

## 2021-03-01 DIAGNOSIS — Z79.4: ICD-10-CM

## 2021-03-01 DIAGNOSIS — E60: ICD-10-CM

## 2021-03-01 DIAGNOSIS — M10.9: ICD-10-CM

## 2021-03-01 DIAGNOSIS — Z90.710: ICD-10-CM

## 2021-03-01 DIAGNOSIS — I10: ICD-10-CM

## 2021-03-01 LAB
GLUCOSE BLD-MCNC: 127 MG/DL (ref 75–99)
GLUCOSE BLD-MCNC: 197 MG/DL (ref 75–99)

## 2021-03-01 PROCEDURE — 94762 N-INVAS EAR/PLS OXIMTRY CONT: CPT

## 2021-03-01 PROCEDURE — 84520 ASSAY OF UREA NITROGEN: CPT

## 2021-03-01 PROCEDURE — 82565 ASSAY OF CREATININE: CPT

## 2021-03-01 PROCEDURE — 8E0W4CZ ROBOTIC ASSISTED PROCEDURE OF TRUNK REGION, PERCUTANEOUS ENDOSCOPIC APPROACH: ICD-10-PCS

## 2021-03-01 PROCEDURE — 84132 ASSAY OF SERUM POTASSIUM: CPT

## 2021-03-01 PROCEDURE — 84100 ASSAY OF PHOSPHORUS: CPT

## 2021-03-01 PROCEDURE — 86850 RBC ANTIBODY SCREEN: CPT

## 2021-03-01 PROCEDURE — 74240 X-RAY XM UPR GI TRC 1CNTRST: CPT

## 2021-03-01 PROCEDURE — 0DJ08ZZ INSPECTION OF UPPER INTESTINAL TRACT, VIA NATURAL OR ARTIFICIAL OPENING ENDOSCOPIC: ICD-10-PCS

## 2021-03-01 PROCEDURE — 0DB64Z3 EXCISION OF STOMACH, PERCUTANEOUS ENDOSCOPIC APPROACH, VERTICAL: ICD-10-PCS

## 2021-03-01 PROCEDURE — 85025 COMPLETE CBC W/AUTO DIFF WBC: CPT

## 2021-03-01 PROCEDURE — 83036 HEMOGLOBIN GLYCOSYLATED A1C: CPT

## 2021-03-01 PROCEDURE — 94760 N-INVAS EAR/PLS OXIMETRY 1: CPT

## 2021-03-01 PROCEDURE — 94640 AIRWAY INHALATION TREATMENT: CPT

## 2021-03-01 PROCEDURE — 80051 ELECTROLYTE PANEL: CPT

## 2021-03-01 PROCEDURE — 80048 BASIC METABOLIC PNL TOTAL CA: CPT

## 2021-03-01 PROCEDURE — 86901 BLOOD TYPING SEROLOGIC RH(D): CPT

## 2021-03-01 PROCEDURE — 82310 ASSAY OF CALCIUM: CPT

## 2021-03-01 PROCEDURE — 83735 ASSAY OF MAGNESIUM: CPT

## 2021-03-01 PROCEDURE — 88307 TISSUE EXAM BY PATHOLOGIST: CPT

## 2021-03-01 PROCEDURE — 36415 COLL VENOUS BLD VENIPUNCTURE: CPT

## 2021-03-01 PROCEDURE — 96365 THER/PROPH/DIAG IV INF INIT: CPT

## 2021-03-01 PROCEDURE — 86900 BLOOD TYPING SEROLOGIC ABO: CPT

## 2021-03-01 RX ADMIN — ONDANSETRON ONE MG: 2 INJECTION INTRAMUSCULAR; INTRAVENOUS at 14:38

## 2021-03-01 RX ADMIN — ISODIUM CHLORIDE SCH MG: 0.03 SOLUTION RESPIRATORY (INHALATION) at 19:52

## 2021-03-01 RX ADMIN — ACETAMINOPHEN SCH MLS/HR: 10 INJECTION, SOLUTION INTRAVENOUS at 23:29

## 2021-03-01 RX ADMIN — DEXAMETHASONE SODIUM PHOSPHATE SCH MG: 4 INJECTION, SOLUTION INTRAMUSCULAR; INTRAVENOUS at 23:11

## 2021-03-01 RX ADMIN — SIMETHICONE SCH MG: 20 SUSPENSION/ DROPS ORAL at 17:33

## 2021-03-01 RX ADMIN — SIMETHICONE SCH MG: 20 SUSPENSION/ DROPS ORAL at 23:12

## 2021-03-01 RX ADMIN — GABAPENTIN SCH: 400 CAPSULE ORAL at 19:17

## 2021-03-01 RX ADMIN — KETOROLAC TROMETHAMINE SCH MG: 15 INJECTION, SOLUTION INTRAMUSCULAR; INTRAVENOUS at 23:11

## 2021-03-01 RX ADMIN — DEXAMETHASONE SODIUM PHOSPHATE SCH MG: 4 INJECTION, SOLUTION INTRAMUSCULAR; INTRAVENOUS at 17:33

## 2021-03-01 RX ADMIN — HYOSCYAMINE SULFATE SCH MG: 0.12 SOLUTION/ DROPS ORAL at 17:34

## 2021-03-01 RX ADMIN — ONDANSETRON ONE MG: 2 INJECTION INTRAMUSCULAR; INTRAVENOUS at 10:25

## 2021-03-01 RX ADMIN — HYDROMORPHONE HYDROCHLORIDE PRN MG: 1 INJECTION, SOLUTION INTRAMUSCULAR; INTRAVENOUS; SUBCUTANEOUS at 14:38

## 2021-03-01 RX ADMIN — KETOROLAC TROMETHAMINE SCH MG: 15 INJECTION, SOLUTION INTRAMUSCULAR; INTRAVENOUS at 17:33

## 2021-03-01 RX ADMIN — ACETAMINOPHEN SCH MLS/HR: 10 INJECTION, SOLUTION INTRAVENOUS at 17:32

## 2021-03-01 RX ADMIN — HYOSCYAMINE SULFATE SCH MG: 0.12 SOLUTION/ DROPS ORAL at 23:12

## 2021-03-01 RX ADMIN — THIAMINE HYDROCHLORIDE SCH MLS/HR: 100 INJECTION, SOLUTION INTRAMUSCULAR; INTRAVENOUS at 20:45

## 2021-03-01 RX ADMIN — ISODIUM CHLORIDE SCH MG: 0.03 SOLUTION RESPIRATORY (INHALATION) at 17:12

## 2021-03-01 RX ADMIN — THIAMINE HYDROCHLORIDE SCH MLS: 100 INJECTION, SOLUTION INTRAMUSCULAR; INTRAVENOUS at 14:51

## 2021-03-01 RX ADMIN — POTASSIUM CHLORIDE SCH MLS: 14.9 INJECTION, SOLUTION INTRAVENOUS at 10:10

## 2021-03-01 RX ADMIN — HYDROMORPHONE HYDROCHLORIDE PRN MG: 1 INJECTION, SOLUTION INTRAMUSCULAR; INTRAVENOUS; SUBCUTANEOUS at 14:43

## 2021-03-01 NOTE — P.OP
Date of Procedure: 03/01/21


Description of Procedure: 

















SURGEON:  FERNANDO SMALL MD





PREOPERATIVE DIAGNOSES:  


1.  Morbid obesity due to excess calories


2.  Body mass index of 57.7, initial


3.  Gastroesophageal reflux disease


4.  Obstructive sleep apnea


5.  Hyperlipidemia


6.  Diabetes type 2, insulin dependent


7.  Osteoarthritis of the knees


8.  Osteoarthritis of the back


9.  Neuropathy


10.  Depressive disorder


11.  History of MRSA infection


12.  Post op nausea and vomiting


13.  Leukocystosis


14.  Iron deficiency anemia


15.  Vitamin D deficiency


16.  Zinc deficiency.





POSTOPERATIVE DIAGNOSES:  


1.  Morbid obesity due to excess calories


2.  Body mass index of 57.7, initial


3.  Gastroesophageal reflux disease


4.  Obstructive sleep apnea


5.  Hyperlipidemia


6.  Diabetes type 2, insulin dependent


7.  Osteoarthritis of the knees


8.  Osteoarthritis of the back


9.  Neuropathy


10.  Depressive disorder


11.  History of MRSA infection


12.  Post op nausea and vomiting


13.  Leukocystosis


14.  Iron deficiency anemia


15.  Vitamin D deficiency


16.  Zinc deficiency.





OPERATION:       


1.  Robotic assisted daVinci Xi laparoscopic sleeve gastrectomy with 40-Guamanian 

bougie, multiport.


2.  Intraoperative esophagogastroduodenoscopy.





ANESTHESIA:  Gen. local anesthetic


ESTIMATED BLOOD LOSS: 5  mL 


SPECIMENS REMOVED: Sleeve gastrectomy


COMPLICATIONS:  None. 





FINDINGS: 


1.  Negative intraoperative esophagogastrojejunoscopy leak test. 


2.  No hepatomegaly and no large hiatus hernia.


3.  Total of 7 staplers used including 2 - 60 mm black robot staples and 5 - 60 

mm green robot loads used to create the gastric sleeve.


4.  Sleeve gastrectomy,  31 x  6 cm 





INDICATIONS: Melita Villegas is a 55-year-old female who comes with lifelong 

morbid obesity.  She is looking into the sleeve gastrectomy.  She has 

comorbidities including obstructive sleep apnea, insulin-dependent diabetes type

2, osteoarthritis of the knees and back





At height of 5 feet 5 inches, her ideal body weight is 149 pounds.  She comes in

316 pounds from 339 pounds, 1 month ago. She lost 23 pounds in 1 month. Her body

mass index is down from 57.7 to 51.2.  She is 167 pounds overweight.





All surgical options for morbid obesity had been described using the [place] 

bariatric surgery collaborative comorbidity resolution including complication 

risk score.  A second-generation bariatric consent form was described in detail 

including the possibility of protein malnutrition, leaks, gastric stricture, 

venous thrombosis, gastroesophageal reflux disease, need for further surgery for

which she demonstrated understanding. Benefits and risks of the procedure were 

described at length. Informed consent was obtained. 





DESCRIPTION: The patient was brought into the operating room theater. 

Preoperatively she had received


Lovenox subcutaneously for DVT prophylaxis. Additionally she had Peridex oral 

solution as an oral decontaminant. 





After general induction, the abdomen was prepped and draped in standard sterile 

fashion. An Ioban draping was placed along the abdomen.





A robotic da Fidencio Xi system was prepped and primed.





Length of her torso from xiphoid to umbilicus is 31 cm.  At 15 cm from the 

xiphoid, proposed port sites were marked with indelible marker along the 

anterior axillary line bilaterally, mid axillary line bilaterally with each 

ports were marked 10 to 15 cm from each other. The assistant port was marked 

along the left lateral abdominal wall.  The robotic stapler port was marked for 

the right midclavicular line.





A 5 mm 0 degrees laparoscopic trocar entry was performed  along the left upper 

quadrant. The abdomen was insufflated to 15 mmHg pressure was tolerated well.  

Diagnostic laparoscopy demonstrated no injury to bowel, viscera, or mesentery.  

No evidence of large hiatus hernia was identified.  The liver edge was sharp 

consistent with 2 week low-carb high-protein diet.  





A 8 mm port was placed along the left upper abdominal wall after exchanging the 

5 mm port.  A separate 8 mm port was placed along the left lateral abdominal 

wall. Please note that the ports were placed at least 20 cm away from the target

anatomy. Care was taken to check each robotic arms were safely away from 

collision with the bed or the patient.





At the epigastrium, a medium sized Deandre liver retractor was placed under 

direct visualization with the Iron Intern placed under the right shoulder of the

patient. Next, 12-mm robot stapler port was placed along the right upper 

quadrant. The camera 8-mm port was maintained along the epigastrium. The patient

was repositioned in reverse Trendelenburg position at 21-degrees after lowering 

the bed. The robot was docked along the left side of the patient.





Using a grasper for arm 4, a vessel sealer for arm 3, including grasper for arm 

1, the robotic system was docked and primed as described.  Instruments were 

interchanged by the assistant for stapler loads.  The camera was placed at 30-

degrees down.





I had sat at the console. 





The pylorus was identified and 6 cm proximally along the greater curvature of 

the stomach, the short gastrics were mobilized


upwards to the angle of His using a vessel sealer.  Hemostasis was excellent 

during this portion of the procedure. Next, the upper pole of the stomach was 

adherent to the left roxy, which was gently dissected free using atraumatic 

grasper.  





I went to the head of the bed and placed 40-Guamanian blunt bougie into the 

stomach.  The bougie was readjusted by the nurse anesthetist.





Robotic stapler black load 60 mm 2 followed by green 60 mm x 5 loads were used 

to create the sleeve.  Initial firing was across the antrum of the stomach 

towards the angle of His. The staple line was linear without corkscrewing. The 

space from the angularis incisura of the sleeve was approximately 4 cm. 





I then went to the head of the bed to perform the intraoperative 

esophagogastroduodenoscopy leak test.  The bougie was withdrawn.  The upper pole

of the stomach was bathed using normal saline solution. The scope was withdrawn 

with careful inspection along the staple line for which no leaks were found 

along the entire length. Additionally,the sleeve was completely hemostatic 

without any encroachment along the angularis incisura. Its topology was a soft 

"J". No stricture was encountered upon placement of the scope. The GI tract was 

desufflated. The patient tolerated this portion of the procedure well. The scope

was completely withdrawn.





The robot was undocked.





I then rescrubbed into case, whereby the irrigation fluid was aspirated from the

abdominal cavity. Tisseel fibrin sealant was placed along the entire staple 

length. Once dried the Deandre liver retractor was removed. 





Attention was now brought to removal of the specimen. The distal end of the 

sleeve gastrectomy specimen was brought out through the 12 mm port at the left 

upper quadrant. The specimen was gently removed en total. No contamination had 

occurred during this process. 





All instruments and pneumoperitoneum including irrigation fluid was removed from

the abdominal cavity. The 12 mm port site was closed using 0-Vicryl and Duccarey Pachecoson and irrigated with diluted hydrogen peroxide.   The final incisions were

closed using subcuticular interrupted suture of 4-0 Monocryl. 





Exofin was applied to the skin once the skin had been cleansed.  OptiFoam 

dressing was placed along the stomach extraction site.  The sleeve specimen was 

measured and checked also for leaks which none were found.





At the end of the procedure, needle, sponge, and instrument count was verified 

correct by the surgical technician. The patient was taken to the postanesthesia 

care unit in stable condition. 





She had tolerated the procedure well. Intraoperative films and findings were 

reviewed with the patient's family.

## 2021-03-01 NOTE — P.GSHP
History of Present Illness


H&P Date: 21








CHIEF COMPLAINT: Morbid obesity





HISTORY OF PRESENT ILLNESS:  Melita Villegas is a 55-year-old female who comes 

with lifelong morbid obesity.  She is looking into the sleeve gastrectomy.  She 

has comorbidities including obstructive sleep apnea, insulin-dependent diabetes 

type 2, osteoarthritis of the knees and back





At height of 5 feet 5 inches, her ideal body weight is 149 pounds.  She comes in

339 pounds. Her body mass index is down from 57.7 to 56.6.  She is 189 pounds 

overweight.





PAST MEDICAL HISTORY: 


1.  Morbid obesity due to excess calories


2.  Body mass index of 57.7, initial


3.  Gastroesophageal reflux disease


4.  Obstructive sleep apnea


5.  Hyperlipidemia


6.  Diabetes type 2, insulin dependent


7.  Osteoarthritis of the knees


8.  Osteoarthritis of the back


9.  Neuropathy


10.  Depressive disorder


11.  History of MRSA infection


12.  Post op nausea and vomiting





PAST SURGICAL HISTORY: 


1.  Cholecystectomy


2.  Hysterectomy


3.  Cervical rodding


4.  Bilateral retinal repair





HOME MEDICATIONS: 


                                Home Medications











 Medication  Instructions  Recorded  Confirmed


 


Atorvastatin [Lipitor] 20 mg PO HS 18


 


Gabapentin [Neurontin] 400 mg PO TID 18


 


Insulin Regular, Human [humulin R 90 unit SQ AC-TID 18





U-500 Kwikpen]   


 


Montelukast [Singulair] 10 mg PO HS 18


 


Pantoprazole Sodium [Protonix] 40 mg PO DAILY 18


 


amLODIPine [Norvasc] 10 mg PO DAILY 18


 


Canagliflozin [Invokana] 300 mg PO DAILY 20


 


Ibuprofen [Motrin] 800 mg PO Q6H 20


 


Ketoconazole 2% Cream [Nizoral 2%] 1 unit TOPICAL BID 20


 


Meloxicam [Mobic] 7.5 mg PO DAILY 20


 


Mirabegron [Myrbetriq] 50 mg PO DAILY 20


 


PARoxetine HCL 30 mg PO DAILY 20


 


Semaglutide [Ozempic] 1 mg SQ WEEKLY 20


 


metFORMIN HCL [metFORMIN HCL ER] 500 mg PO BID 20


 


Ergocalciferol [Vitamin D2 5,000 unit PO WEEKLY 20





(DRISDOL)]   


 


Iron 64 mg PO BID 20








                                  Previous Rx's











 Medication  Instructions  Recorded


 


Aspirin 81 mg PO DAILY #1 chewable 02/26/15











ALLERGIES:


                                    Allergies











Allergy/AdvReac Type Severity Reaction Status Date / Time


 


Sulfa (Sulfonamide Allergy  Anaphylaxis Verified 18 18:58





Antibiotics)     











SOCIAL HISTORY: Past tobacco use.   





FAMILY HISTORY: No family history of ulcerative colitis disease or Crohn's 

disease. Family history of morbid obesity. No lupus in the family.  No reports 

of stomach or esophageal cancer.  





REVIEW OF ORGAN SYSTEMS: 


CONSTITUTIONAL: At height of 5 feet 5 inches, her ideal body weight is 149 

pounds.  Her highest weight is 346 pounds.  Her body mass index is 57.7.  She is

197 pounds overweight.


HEENT: Denies any active troubles with vision or hearing.  Has troubles with 

swallowing.  


ENDOCRINE: Has diabetes. No hypothyroidism. 


CARDIOVASCULAR: Past reports of palpitations or heart attacks or chest pain.  

Has hyperlipidemia. Has hypertension. 


RESPIRATORY: Has daytime somnolence. Has asthma. 


GASTROINTESTINAL: Denies any bright red blood per rectum.  No diarrhea. No 

constipation. Has gastroesophageal reflux disease. 


MUSCULOSKELETAL: Has lower back pain and joint pain.  Has osteoarthritis of the 

knees.  


NEURO: No headaches. No seizure disorders.  Has neuropathy. 


PSYCH: Has depression. No suicidal ideation.   


RHEUMATOLOGIC: No lupus.  No rheumatoid arthritis.  


HEMATOLOGIC: Denies any abnormal bleeding or bruising.  No personal history of 

DVTs.


SKIN: No rash.  No skin cancer.





PHYSICAL EXAM: 


VITAL SIGNS: Height 5 foot 5 inches, weight 338 pounds. BMI 56.4





GENERAL: Well-developed in no acute distress.  


HEENT: No scleral icterus.  Extraocular movements grossly intact.  Hears 

conversational speech.  No nasal drainage.


NECK: Supple without lymphadenopathy. 


CHEST: Nonlabored respirations with equal bilateral excursions. 


CARDIOVASCULAR: Regular rate and regular rhythm. Distal 2+ pulses. 


ABDOMEN: Obese, soft, nontender, nondistended. 


MUSCULOSKELETAL: No clubbing, cyanosis. 


NEURO: No focal or lateralizing signs. Cranial nerves 2 through 12 grossly 

within normal limits. 


PSYCH: Appropriate affect. Alert and oriented to person, place and time.


SKIN: Good skin turgor.  Well perfused.








ASSESSMENT: 


1.  Morbid obesity due to excess calories


2.  Body mass index of 57.7, initial


3.  Gastroesophageal reflux disease


4.  Obstructive sleep apnea


5.  Hyperlipidemia


6.  Diabetes type 2, insulin dependent


7.  Osteoarthritis of the knees


8.  Osteoarthritis of the back


9.  Neuropathy


10.  Depressive disorder


11.  History of MRSA infection


12.  Post op nausea and vomiting


13.  Leukocystosis


14.  Iron deficiency anemia


15.  Vitamin D deficiency


16.  Zinc deficiency.





PLAN: 


1.  Bariatric options between a sleeve, band and a Ana Maria-en-Y gastric bypass were

reviewed in detail. The patient elected for a sleeve gastrectomy.  Robotic 

assisted approach described.  


2. DVT prophylaxis per [place] [facility].


3.  Antibiotic prophylaxis.


4.  Inpatient hospitalization anticipated for more than 2 nights.


5.  She is at elevated risk for perioperative complications with pre-existing 

heart disease and BMI over 50.








Past Medical History


Past Medical History: Diabetes Mellitus, Eye Disorder, GERD/Reflux, 

Hyperlipidemia, Hypertension, Skin Disorder, Sleep Apnea/CPAP/BIPAP


Additional Past Medical History / Comment(s): 2018 - fluid on lungs removed, 

DDD.,  uses c-pap machine., claustrophobic., diabetic retinapathy-legally blind 

right eye., neuropathy feet., gout., occasional yeast infection in folds of 

skin., OAB., Iron deficiency anemia- states iron transfusion at [facility] on 21., chronic elevated wbc's.,  pt states currently on high 

protein diet per Dr. Mcfarlanes instructions.


History of Any Multi-Drug Resistant Organisms: MRSA


Date of last positivie culture/infection: 


MDRO Source:: legs and breasts.


Past Surgical History: Cholecystectomy, Hysterectomy, Orthopedic Surgery


Additional Past Surgical History / Comment(s): cataract sx with lens implants, 

evans retinal repair. Cervical rodding for 3 ruptured discs.


Past Anesthesia/Blood Transfusion Reactions: Postoperative Nausea & Vomiting 

(PONV)


Additional Past Anesthesia/Blood Transfusion Reaction / Comment(s): cataract 

surgery @ [facility] on 1/21/15 and afterwards oxygen was low and she was

hospitalized  (later diagnosed with sleep apnea), states they told her she had a

very narrow airway.-denies receiving a letter .


Past Psychological History: No Psychological Hx Reported


Additional Psychological History / Comment(s): states severe claustrophobic.


Smoking Status: Former smoker


Past Alcohol Use History: None Reported


Additional Past Alcohol Use History / Comment(s): quit smoking , smoked 1 

ppd, started smoking age 19.


Past Drug Use History: None Reported





- Past Family History


  ** Father


Family Medical History: Cancer, Myocardial Infarction (MI)


Additional Family Medical History / Comment(s): Father had 5 MI's and  of 

bone cancer.





  ** Mother


Family Medical History: Cancer


Additional Family Medical History / Comment(s): cervical cancer





Medications and Allergies


                                Home Medications











 Medication  Instructions  Recorded  Confirmed  Type


 


Aspirin 81 mg PO DAILY #1 chewable 02/26/15 02/26/21 Rx


 


Atorvastatin [Lipitor] 20 mg PO DAILY 18 History


 


Gabapentin [Neurontin] 800 mg PO BID 18 History


 


Montelukast [Singulair] 10 mg PO DAILY 18 History


 


Pantoprazole Sodium [Protonix] 40 mg PO DAILY 18 History


 


amLODIPine [Norvasc] 10 mg PO DAILY 18 History


 


Canagliflozin [Invokana] 300 mg PO DAILY 20 History


 


Ibuprofen [Motrin] 800 mg PO AS DIRECTED PRN 20 History


 


Mirabegron [Myrbetriq] 50 mg PO DAILY 20 History


 


PARoxetine HCL 30 mg PO DAILY 20 History


 


metFORMIN HCL [metFORMIN HCL ER] 1,000 mg PO AC-BID 20 History


 


Allopurinol [Zyloprim] 100 mg PO DAILY 21 History


 


Ergocalciferol [Vitamin D2 (1250 1,250 mcg PO WE 21 History





Mcg = 77308 Iu)]    


 


Ferrous Sulfate [Iron (65  mg PO QAM 21 History





Elemental)]    


 


Gabapentin [Neurontin] 400 mg PO 1200 21 History


 


Insulin Glargine,Hum.rec.anlog 50 unit SQ QAM 21 History





[Lantus Solostar]    


 


Loratadine 10 mg PO DAILY 21 History


 


Nystatin 100,000 Unit/gm Oint 1 applic TOPICAL BID 21 History





[Mycostatin Oint]    


 


Zinc 22 mg PO TID 21 History








                                    Allergies











Allergy/AdvReac Type Severity Reaction Status Date / Time


 


Sulfa (Sulfonamide Allergy  Anaphylaxis Verified 21 09:17





Antibiotics)

## 2021-03-02 LAB
ANION GAP SERPL CALC-SCNC: 21.8 MMOL/L (ref 4–12)
BASOPHILS # BLD AUTO: 0.03 X 10*3/UL (ref 0–0.1)
BASOPHILS NFR BLD AUTO: 0.2 %
BUN SERPL-SCNC: 19 MG/DL (ref 9–27)
CALCIUM SPEC-MCNC: 8.6 MG/DL (ref 8.7–10.3)
CHLORIDE SERPL-SCNC: 103 MMOL/L (ref 96–109)
CO2 SERPL-SCNC: 14.2 MMOL/L (ref 21.6–31.8)
EOSINOPHIL # BLD AUTO: 0 X 10*3/UL (ref 0.04–0.35)
EOSINOPHIL NFR BLD AUTO: 0 %
ERYTHROCYTE [DISTWIDTH] IN BLOOD BY AUTOMATED COUNT: 4.82 X 10*6/UL (ref 4.1–5.2)
ERYTHROCYTE [DISTWIDTH] IN BLOOD: 19.2 % (ref 11.5–14.5)
GLUCOSE BLD-MCNC: 201 MG/DL (ref 75–99)
GLUCOSE BLD-MCNC: 225 MG/DL (ref 75–99)
GLUCOSE BLD-MCNC: 228 MG/DL (ref 75–99)
GLUCOSE BLD-MCNC: 235 MG/DL (ref 75–99)
HBA1C MFR BLD: 9.8 % (ref 4–6)
HCT VFR BLD AUTO: 40.9 % (ref 37.2–46.3)
HGB BLD-MCNC: 12.9 G/DL (ref 12–15)
LYMPHOCYTES # SPEC AUTO: 1.3 X 10*3/UL (ref 0.9–5)
LYMPHOCYTES NFR SPEC AUTO: 10.1 %
MAGNESIUM SPEC-SCNC: 2 MG/DL (ref 1.5–2.4)
MCH RBC QN AUTO: 26.8 PG (ref 27–32)
MCHC RBC AUTO-ENTMCNC: 31.5 G/DL (ref 32–37)
MCV RBC AUTO: 84.9 FL (ref 80–97)
MONOCYTES # BLD AUTO: 0.3 X 10*3/UL (ref 0.2–1)
MONOCYTES NFR BLD AUTO: 2.3 %
NEUTROPHILS # BLD AUTO: 11.13 X 10*3/UL (ref 1.8–7.7)
NEUTROPHILS NFR BLD AUTO: 86.4 %
PLATELET # BLD AUTO: 301 X 10*3/UL (ref 140–440)
POTASSIUM SERPL-SCNC: 5.3 MMOL/L (ref 3.5–5.5)
SODIUM SERPL-SCNC: 139 MMOL/L (ref 135–145)
WBC # BLD AUTO: 12.89 X 10*3/UL (ref 4.5–10)

## 2021-03-02 RX ADMIN — KETOROLAC TROMETHAMINE SCH MG: 15 INJECTION, SOLUTION INTRAMUSCULAR; INTRAVENOUS at 11:21

## 2021-03-02 RX ADMIN — GABAPENTIN SCH MG: 400 CAPSULE ORAL at 11:21

## 2021-03-02 RX ADMIN — ISODIUM CHLORIDE SCH MG: 0.03 SOLUTION RESPIRATORY (INHALATION) at 12:05

## 2021-03-02 RX ADMIN — ENOXAPARIN SODIUM SCH MG: 40 INJECTION SUBCUTANEOUS at 08:00

## 2021-03-02 RX ADMIN — CEFAZOLIN SCH MLS/HR: 330 INJECTION, POWDER, FOR SOLUTION INTRAMUSCULAR; INTRAVENOUS at 16:38

## 2021-03-02 RX ADMIN — GABAPENTIN SCH MG: 400 CAPSULE ORAL at 08:01

## 2021-03-02 RX ADMIN — HYOSCYAMINE SULFATE SCH MG: 0.12 SOLUTION/ DROPS ORAL at 17:19

## 2021-03-02 RX ADMIN — ACETAMINOPHEN SCH MLS/HR: 10 INJECTION, SOLUTION INTRAVENOUS at 11:21

## 2021-03-02 RX ADMIN — INSULIN ASPART SCH UNIT: 100 INJECTION, SOLUTION INTRAVENOUS; SUBCUTANEOUS at 21:55

## 2021-03-02 RX ADMIN — ISODIUM CHLORIDE SCH: 0.03 SOLUTION RESPIRATORY (INHALATION) at 15:33

## 2021-03-02 RX ADMIN — HYOSCYAMINE SULFATE SCH MG: 0.12 SOLUTION/ DROPS ORAL at 23:09

## 2021-03-02 RX ADMIN — DEXAMETHASONE SODIUM PHOSPHATE SCH MG: 4 INJECTION, SOLUTION INTRAMUSCULAR; INTRAVENOUS at 11:21

## 2021-03-02 RX ADMIN — MONTELUKAST SODIUM SCH MG: 10 TABLET, FILM COATED ORAL at 08:00

## 2021-03-02 RX ADMIN — DEXAMETHASONE SODIUM PHOSPHATE SCH MG: 4 INJECTION, SOLUTION INTRAMUSCULAR; INTRAVENOUS at 05:19

## 2021-03-02 RX ADMIN — KETOROLAC TROMETHAMINE SCH MG: 15 INJECTION, SOLUTION INTRAMUSCULAR; INTRAVENOUS at 23:08

## 2021-03-02 RX ADMIN — DEXAMETHASONE SODIUM PHOSPHATE SCH MG: 4 INJECTION, SOLUTION INTRAMUSCULAR; INTRAVENOUS at 23:08

## 2021-03-02 RX ADMIN — ISODIUM CHLORIDE SCH MG: 0.03 SOLUTION RESPIRATORY (INHALATION) at 08:58

## 2021-03-02 RX ADMIN — SIMETHICONE SCH MG: 20 SUSPENSION/ DROPS ORAL at 23:09

## 2021-03-02 RX ADMIN — SIMETHICONE SCH MG: 20 SUSPENSION/ DROPS ORAL at 05:20

## 2021-03-02 RX ADMIN — THIAMINE HYDROCHLORIDE SCH: 100 INJECTION, SOLUTION INTRAMUSCULAR; INTRAVENOUS at 00:02

## 2021-03-02 RX ADMIN — POTASSIUM CHLORIDE SCH: 14.9 INJECTION, SOLUTION INTRAVENOUS at 00:02

## 2021-03-02 RX ADMIN — ACETAMINOPHEN SCH MLS/HR: 10 INJECTION, SOLUTION INTRAVENOUS at 17:18

## 2021-03-02 RX ADMIN — INSULIN ASPART SCH UNIT: 100 INJECTION, SOLUTION INTRAVENOUS; SUBCUTANEOUS at 11:46

## 2021-03-02 RX ADMIN — KETOROLAC TROMETHAMINE SCH MG: 15 INJECTION, SOLUTION INTRAMUSCULAR; INTRAVENOUS at 17:18

## 2021-03-02 RX ADMIN — SIMETHICONE SCH MG: 20 SUSPENSION/ DROPS ORAL at 11:22

## 2021-03-02 RX ADMIN — ACETAMINOPHEN SCH MLS/HR: 10 INJECTION, SOLUTION INTRAVENOUS at 05:19

## 2021-03-02 RX ADMIN — DEXAMETHASONE SODIUM PHOSPHATE SCH MG: 4 INJECTION, SOLUTION INTRAMUSCULAR; INTRAVENOUS at 17:18

## 2021-03-02 RX ADMIN — KETOROLAC TROMETHAMINE SCH MG: 15 INJECTION, SOLUTION INTRAMUSCULAR; INTRAVENOUS at 05:18

## 2021-03-02 RX ADMIN — HYOSCYAMINE SULFATE SCH MG: 0.12 SOLUTION/ DROPS ORAL at 11:22

## 2021-03-02 RX ADMIN — SIMETHICONE SCH MG: 20 SUSPENSION/ DROPS ORAL at 17:19

## 2021-03-02 RX ADMIN — GABAPENTIN SCH MG: 400 CAPSULE ORAL at 21:55

## 2021-03-02 RX ADMIN — ACETAMINOPHEN SCH MLS/HR: 10 INJECTION, SOLUTION INTRAVENOUS at 23:08

## 2021-03-02 RX ADMIN — ISODIUM CHLORIDE SCH: 0.03 SOLUTION RESPIRATORY (INHALATION) at 19:35

## 2021-03-02 RX ADMIN — INSULIN ASPART SCH UNIT: 100 INJECTION, SOLUTION INTRAVENOUS; SUBCUTANEOUS at 17:19

## 2021-03-02 RX ADMIN — HYOSCYAMINE SULFATE SCH MG: 0.12 SOLUTION/ DROPS ORAL at 05:19

## 2021-03-02 NOTE — FL
EXAMINATION TYPE: FL UGI

 

DATE OF EXAM: 3/2/2021

 

COMPARISON: None

 

HISTORY: Postop gastric sleeve

 

TECHNIQUE:  A single contrast UGI study is performed.

 

FINDINGS: 

Fluoroscopy time: 26 seconds

Images: 26

 

Contrast passes from the distal esophagus through the gastric sleeve with mild hesitancy. No extravas
ation of contrast is evident.

 

 No free air is noted during this examination.

 

Overhead radiographs were obtained which are unremarkable.

 

IMPRESSIONS:

1. Normal post gastric sleeve without obstruction or hesitancy. No extravasation.

## 2021-03-02 NOTE — P.PN
Subjective


Progress Note Date: 03/02/21





CHIEF COMPLAINT: Morbid obesity





HISTORY OF PRESENT ILLNESS: Patient is postop day #1 status post Robotic 

assisted daVinci Xi laparoscopic sleeve gastrectomy and Intraoperative 

esophagogastroduodenoscopy.  Her upper GI shows normal post gastric sleeve 

without obstruction or hesitancy.  No extravasation.  Patient received reports 

being able to swallow her bariatric clear liquids without difficulty.  She 

denies any nausea or vomiting.  She reports that her pain is controlled.  She 

has been up and ambulating.  Denies any flatus or BM.  Afebrile.  Heart rate 111

blood pressure 170/75 WBC 12.89 slightly elevated possibly due to steroids.  Hgb

12.9 sodium 139 potassium 5.3 creatinine 1.4 magnesium 2.0 glucose 225








PHYSICAL EXAM: 


VITAL SIGNS: Reviewed


GENERAL: Well-developed in no acute distress. 


HEENT:  No sclera icterus. Extraocular movements grossly intact.  Moist buccal 

mucosa. 


Head is atraumatic, normocephalic. Hears conversational speech. No nasal 

drainage.


NECK:  Supple without lymphadenopathy.


CHEST:  Non-labored respirations and equal bilateral excursions. 


CARDIOVASCULAR: Palpable 2+ radial pulses.


ABDOMEN:  Soft.  Nondistended. Nontender.


MUSCULOSKELETAL:  No clubbing or cyanosis.


NEUROLOGIC:  No focal or lateralizing signs.  Cranial nerves II through XII 

grossly intact.


PSYCH:  Appropriate affect.  Alert and oriented to person, place and time.


SKIN: Well perfused.  Good skin turgor. 





ASSESSMENT: 


1.  Morbid obesity due to excess calories


2.  Body mass index of 57.7, initial


3.  Gastroesophageal reflux disease


4.  Obstructive sleep apnea


5.  Hyperlipidemia


6.  Diabetes type 2, insulin dependent


7.  Osteoarthritis of the knees


8.  Osteoarthritis of the back


9.  Neuropathy


10.  Depressive disorder


11.  History of MRSA infection


12.  Post op nausea and vomiting


13.  Leukocystosis


14.  Iron deficiency anemia


15.  Vitamin D deficiency


16.  Zinc deficiency.





PLAN: 


-Patient received a 2 L fluid bolus this morning


-Continue bariatric clear liquid diet


-Continue pain medications


-Add Humalog sliding scale coverage for elevated blood sugars


-Check hemoglobin A1c


-We'll remove potassium from fluids due to K level of 5.3


-Continue hypertensive medications


-Encourage incentive spirometer use and to ambulate





Physician Assistant note has been reviewed by physician. Signing provider agrees

with the documented findings, assessment, and plan of care. 





Objective





- Vital Signs


Vital signs: 


                                   Vital Signs











Temp  98 F   03/02/21 13:47


 


Pulse  111 H  03/02/21 13:47


 


Resp  19   03/02/21 13:47


 


BP  170/75   03/02/21 13:47


 


Pulse Ox  96   03/02/21 13:47








                                 Intake & Output











 03/01/21 03/02/21 03/02/21





 18:59 06:59 18:59


 


Intake Total 2100  


 


Output Total 5  


 


Balance 2095  


 


Weight 143.8 kg  143.8 kg


 


Intake:   


 


  IV 2100  


 


Output:   


 


  Estimated Blood Loss 5  


 


Other:   


 


  Voiding Method  Toilet 


 


  # Voids  2 














- Labs


CBC & Chem 7: 


                                 03/02/21 06:27





                                 03/02/21 06:27


Labs: 


                  Abnormal Lab Results - Last 24 Hours (Table)











  03/01/21 03/02/21 03/02/21 Range/Units





  14:16 06:27 06:27 


 


WBC   12.89 H   (4.50-10.00)  X 10*3/uL


 


MCH   26.8 L   (27.0-32.0)  pg


 


MCHC   31.5 L   (32.0-37.0)  g/dL


 


RDW   19.2 H   (11.5-14.5)  %


 


Immature Gran #   0.13 H   (0.00-0.04)  X 10*3/uL


 


Neutrophils #   11.13 H   (1.80-7.70)  X 10*3/uL


 


Eosinophils #   0 L   (0.04-0.35)  X 10*3/uL


 


Carbon Dioxide    14.2 L  (21.6-31.8)  mmol/L


 


Anion Gap    21.80 H  (4.00-12.00)  mmol/L


 


Est GFR (CKD-EPI)AfAm    49.2 L  (60.0-200.0)   


 


Est GFR (CKD-EPI)NonAf    42.5 L  (60.0-200.0)   


 


POC Glucose (mg/dL)  197 H    (75-99)  mg/dL


 


Calcium    8.6 L  (8.7-10.3)  mg/dL














  03/02/21 03/02/21 Range/Units





  07:17 11:45 


 


WBC    (4.50-10.00)  X 10*3/uL


 


MCH    (27.0-32.0)  pg


 


MCHC    (32.0-37.0)  g/dL


 


RDW    (11.5-14.5)  %


 


Immature Gran #    (0.00-0.04)  X 10*3/uL


 


Neutrophils #    (1.80-7.70)  X 10*3/uL


 


Eosinophils #    (0.04-0.35)  X 10*3/uL


 


Carbon Dioxide    (21.6-31.8)  mmol/L


 


Anion Gap    (4.00-12.00)  mmol/L


 


Est GFR (CKD-EPI)AfAm    (60.0-200.0)   


 


Est GFR (CKD-EPI)NonAf    (60.0-200.0)   


 


POC Glucose (mg/dL)  228 H  225 H  (75-99)  mg/dL


 


Calcium    (8.7-10.3)  mg/dL

## 2021-03-03 VITALS — HEART RATE: 81 BPM | DIASTOLIC BLOOD PRESSURE: 74 MMHG | SYSTOLIC BLOOD PRESSURE: 140 MMHG | RESPIRATION RATE: 18 BRPM

## 2021-03-03 VITALS — TEMPERATURE: 98.2 F

## 2021-03-03 LAB
ANION GAP SERPL CALC-SCNC: 16.1 MMOL/L (ref 4–12)
BASOPHILS # BLD AUTO: 0.03 X 10*3/UL (ref 0–0.1)
BASOPHILS NFR BLD AUTO: 0.2 %
BUN SERPL-SCNC: 21 MG/DL (ref 9–27)
BUN/CREAT SERPL: 17.5 RATIO (ref 12–20)
CALCIUM SPEC-MCNC: 8.8 MG/DL (ref 8.7–10.3)
CHLORIDE SERPL-SCNC: 104 MMOL/L (ref 96–109)
CO2 SERPL-SCNC: 16.9 MMOL/L (ref 21.6–31.8)
EOSINOPHIL # BLD AUTO: 0 X 10*3/UL (ref 0.04–0.35)
EOSINOPHIL NFR BLD AUTO: 0 %
ERYTHROCYTE [DISTWIDTH] IN BLOOD BY AUTOMATED COUNT: 4.96 X 10*6/UL (ref 4.1–5.2)
ERYTHROCYTE [DISTWIDTH] IN BLOOD: 19.9 % (ref 11.5–14.5)
GLUCOSE BLD-MCNC: 182 MG/DL (ref 75–99)
GLUCOSE BLD-MCNC: 209 MG/DL (ref 75–99)
GLUCOSE SERPL-MCNC: 216 MG/DL (ref 70–110)
HCT VFR BLD AUTO: 41.5 % (ref 37.2–46.3)
HGB BLD-MCNC: 13.1 G/DL (ref 12–15)
LYMPHOCYTES # SPEC AUTO: 1.48 X 10*3/UL (ref 0.9–5)
LYMPHOCYTES NFR SPEC AUTO: 12.2 %
MCH RBC QN AUTO: 26.4 PG (ref 27–32)
MCHC RBC AUTO-ENTMCNC: 31.6 G/DL (ref 32–37)
MCV RBC AUTO: 83.7 FL (ref 80–97)
MONOCYTES # BLD AUTO: 0.37 X 10*3/UL (ref 0.2–1)
MONOCYTES NFR BLD AUTO: 3.1 %
NEUTROPHILS # BLD AUTO: 10.15 X 10*3/UL (ref 1.8–7.7)
NEUTROPHILS NFR BLD AUTO: 83.8 %
PLATELET # BLD AUTO: 310 X 10*3/UL (ref 140–440)
POTASSIUM SERPL-SCNC: 4.7 MMOL/L (ref 3.5–5.5)
SODIUM SERPL-SCNC: 137 MMOL/L (ref 135–145)
WBC # BLD AUTO: 12.11 X 10*3/UL (ref 4.5–10)

## 2021-03-03 RX ADMIN — INSULIN ASPART SCH UNIT: 100 INJECTION, SOLUTION INTRAVENOUS; SUBCUTANEOUS at 11:59

## 2021-03-03 RX ADMIN — DEXAMETHASONE SODIUM PHOSPHATE SCH MG: 4 INJECTION, SOLUTION INTRAMUSCULAR; INTRAVENOUS at 05:30

## 2021-03-03 RX ADMIN — HYOSCYAMINE SULFATE SCH MG: 0.12 SOLUTION/ DROPS ORAL at 05:31

## 2021-03-03 RX ADMIN — GABAPENTIN SCH MG: 400 CAPSULE ORAL at 11:58

## 2021-03-03 RX ADMIN — ISODIUM CHLORIDE SCH: 0.03 SOLUTION RESPIRATORY (INHALATION) at 15:26

## 2021-03-03 RX ADMIN — CEFAZOLIN SCH MLS/HR: 330 INJECTION, POWDER, FOR SOLUTION INTRAMUSCULAR; INTRAVENOUS at 05:31

## 2021-03-03 RX ADMIN — ACETAMINOPHEN SCH MLS/HR: 10 INJECTION, SOLUTION INTRAVENOUS at 11:58

## 2021-03-03 RX ADMIN — ISODIUM CHLORIDE SCH: 0.03 SOLUTION RESPIRATORY (INHALATION) at 11:27

## 2021-03-03 RX ADMIN — KETOROLAC TROMETHAMINE SCH MG: 15 INJECTION, SOLUTION INTRAMUSCULAR; INTRAVENOUS at 05:30

## 2021-03-03 RX ADMIN — DEXAMETHASONE SODIUM PHOSPHATE SCH MG: 4 INJECTION, SOLUTION INTRAMUSCULAR; INTRAVENOUS at 12:00

## 2021-03-03 RX ADMIN — SIMETHICONE SCH MG: 20 SUSPENSION/ DROPS ORAL at 11:59

## 2021-03-03 RX ADMIN — ENOXAPARIN SODIUM SCH MG: 40 INJECTION SUBCUTANEOUS at 07:38

## 2021-03-03 RX ADMIN — MONTELUKAST SODIUM SCH MG: 10 TABLET, FILM COATED ORAL at 07:38

## 2021-03-03 RX ADMIN — GABAPENTIN SCH MG: 400 CAPSULE ORAL at 07:38

## 2021-03-03 RX ADMIN — KETOROLAC TROMETHAMINE SCH MG: 15 INJECTION, SOLUTION INTRAMUSCULAR; INTRAVENOUS at 11:58

## 2021-03-03 RX ADMIN — HYOSCYAMINE SULFATE SCH MG: 0.12 SOLUTION/ DROPS ORAL at 12:00

## 2021-03-03 RX ADMIN — INSULIN ASPART SCH UNIT: 100 INJECTION, SOLUTION INTRAVENOUS; SUBCUTANEOUS at 07:38

## 2021-03-03 RX ADMIN — ACETAMINOPHEN SCH MLS/HR: 10 INJECTION, SOLUTION INTRAVENOUS at 05:30

## 2021-03-03 RX ADMIN — ISODIUM CHLORIDE SCH: 0.03 SOLUTION RESPIRATORY (INHALATION) at 07:57

## 2021-03-03 RX ADMIN — SIMETHICONE SCH MG: 20 SUSPENSION/ DROPS ORAL at 05:31

## 2021-03-03 NOTE — P.DS
Providers


Date of admission: 


03/01/21 09:12





Expected date of discharge: 03/03/21


Attending physician: 


Dali Dimas





Primary care physician: 


Rosa Bain





Hospital Course: 





Discharge diagnosis


1.  Morbid obesity due to excess calories


2.  Body mass index of 57.7, initial


3.  Gastroesophageal reflux disease


4.  Obstructive sleep apnea


5.  Hyperlipidemia


6.  Diabetes type 2, insulin dependent


7.  Osteoarthritis of the knees


8.  Osteoarthritis of the back


9.  Neuropathy


10.  Depressive disorder


11.  History of MRSA infection


12.  Post op nausea and vomiting


13.  Leukocystosis


14.  Iron deficiency anemia


15.  Vitamin D deficiency


16.  Zinc deficiency.





Hospital course


April Nelly is a 55-year-old female who comes with lifelong morbid obesity. 

She has comorbidities including obstructive sleep apnea, insulin-dependent 

diabetes type 2, osteoarthritis of the knees and back.  Patient is status post 

Robotic assisted daVinci Xi laparoscopic sleeve gastrectomy and Intraoperative 

esophagogastroduodenoscopy.  Her upper GI shows normal post gastric sleeve 

without obstruction or hesitancy.  No extravasation.  Patient is tolerating her 

bariatric clear liquid diet.  She is up and ambulating.  She is passing gas.  

Her pain is controlled.  She is afebrile.  Patient is stable for discharge.





Physician Assistant note has been reviewed by physician. Signing provider agrees

with the documented findings, assessment, and plan of care. 


Patient Condition at Discharge: Stable





Plan - Discharge Summary


Discharge Rx Participant: Yes


New Discharge Prescriptions: 


Continue


   Pantoprazole Sodium [Protonix] 40 mg PO DAILY


   amLODIPine [Norvasc] 10 mg PO DAILY


   Montelukast [Singulair] 10 mg PO DAILY


   Gabapentin [Neurontin] 800 mg PO BID


   metFORMIN HCL [metFORMIN HCL ER] 1,000 mg PO AC-BID


   PARoxetine HCL 30 mg PO DAILY


   Mirabegron [Myrbetriq] 50 mg PO DAILY


   Allopurinol [Zyloprim] 100 mg PO DAILY


   Gabapentin [Neurontin] 400 mg PO 1200


   Insulin Glargine,Hum.rec.anlog [Lantus Solostar] 50 unit SQ QAM


   Loratadine 10 mg PO DAILY





Discontinued


   Aspirin 81 mg PO DAILY #1 chewable


   Atorvastatin [Lipitor] 20 mg PO DAILY


   Canagliflozin [Invokana] 300 mg PO DAILY


   Ibuprofen [Motrin] 800 mg PO AS DIRECTED PRN


     PRN Reason: Pain


   Ergocalciferol [Vitamin D2 (1250 Mcg = 73756 Iu)] 1,250 mcg PO WE


   Ferrous Sulfate [Iron (65 MG Elemental)] 325 mg PO QAM


   Nystatin 100,000 Unit/gm Oint [Mycostatin Oint] 1 applic TOPICAL BID


   Zinc 22 mg PO TID


Discharge Medication List





Gabapentin [Neurontin] 800 mg PO BID 02/13/18 [History]


Montelukast [Singulair] 10 mg PO DAILY 02/13/18 [History]


Pantoprazole Sodium [Protonix] 40 mg PO DAILY 02/13/18 [History]


amLODIPine [Norvasc] 10 mg PO DAILY 02/13/18 [History]


Mirabegron [Myrbetriq] 50 mg PO DAILY 09/02/20 [History]


PARoxetine HCL 30 mg PO DAILY 09/02/20 [History]


metFORMIN HCL [metFORMIN HCL ER] 1,000 mg PO AC-BID 09/02/20 [History]


Allopurinol [Zyloprim] 100 mg PO DAILY 01/19/21 [History]


Gabapentin [Neurontin] 400 mg PO 1200 01/19/21 [History]


Insulin Glargine,Hum.rec.anlog [Lantus Solostar] 50 unit SQ QAM 01/19/21 

[History]


Loratadine 10 mg PO DAILY 01/19/21 [History]








Follow up Appointment(s)/Referral(s): 


[facility] [[facility]] - 03/05/21 9:00 am


Patient Instructions/Handouts:  Nutrition after Bariatric Surgery (GEN), 

Laparoscopic Sleeve Gastrectomy (GEN)


Activity/Diet/Wound Care/Special Instructions: 


Liquid diet only for 2 weeks until March 15th


No lifting over 4 pounds in 4 weeks, March 29th


May Shower. 





No soaking in bath tubs, March 15th


Please notify your surgeon if you develop nausea and vomiting including new 

onset of abdominal pain.  


Continue to use incentive spirometry to prevent pneumonias.   


Please continue to ambulate at home to prevent blood clots in legs.





You have new prescriptions at your local pharmacy.





Follow-up at the bariatric center. May shower. Dressings to be discontinued by 

surgeon in the office.





Drink 64 oz of fluid daily. Start protein shakes on Thursday. Notify bariatric 

center for temp over 101.0, increased pain, drainage from incisions. No straws 

or carbonated beverages. Liquid diet only. Sugar content should be less than 6 g

to avoid dumping syndrome. Take MOM for constipation. 





CRUSH, OPEN, OR CUT TABLETS LARGER THAN A SIZE OF A TIC TAC


Discharge Disposition: HOME SELF-CARE

## 2021-03-05 ENCOUNTER — HOSPITAL ENCOUNTER (OUTPATIENT)
Age: 56
Discharge: HOME | End: 2021-03-05
Payer: MEDICARE

## 2021-03-05 PROCEDURE — 99211 OFF/OP EST MAY X REQ PHY/QHP: CPT

## 2021-03-05 NOTE — P.PN
Subjective


Progress Note Date: 03/05/21








DATE OF SERVICE: 03/05/2021





CHIEF COMPLAINT: Status post sleeve gastrectomy





HISTORY OF PRESENT ILLNESS:  Melita Villegas is a 55-year-old female status post

sleeve gastrectomy, 03/01/2021. She is doing well.  She is postop day 4. She is 

passing flatus.  No bowel movement.  She has pre-existing constipation.  She is 

taking omeprazole.  Her blood sugars under 120 with no additional insulin.  She 

is on metformin.  [facility]i instructions are for pounds 4 weeks described. 

Additionally discontinuing antihyperglycemic medication should blood sugar 

dropped under 100.  Adequate protein intake 75 g daily and described including 

adequate IV fluid hydration at least 64 ounces reviewed.  Follow-up in 2 weeks.





At height of 5 feet 5 inches, her ideal body weight is 149 pounds.  She comes in

319 pounds from 336 pounds, 2 months ago. She has lost 17 pounds in 2 months.  

Her body mass index is down from 57.7 to 56.1.  Her lifetime weight loss is 27 

pounds. Percent excess lifetime weight loss is 14%. She is 170 pounds 

overweight.





PHYSICAL EXAM: 


VITAL SIGNS: Height 5 foot 5 inches, weight 319 pounds. BMI 53.3


                                   Vital Signs











Temp  98.0 F   03/05/21 09:04


 


Pulse  80   03/05/21 09:04


 


Resp      


 


BP  145/78   03/05/21 09:04


 


Pulse Ox      














GENERAL: Well-developed in no acute distress.  


HEENT: No scleral icterus.  Extraocular movements grossly intact.  Hears 

conversational speech.  No nasal drainage.


NECK: Supple without lymphadenopathy. 


CHEST: Nonlabored respirations with equal bilateral excursions. 


CARDIOVASCULAR: Regular rate and regular rhythm. Distal 2+ pulses. 


ABDOMEN: Obese, soft, nontender, nondistended. Severe panniculitis


MUSCULOSKELETAL: No clubbing, cyanosis. 


NEURO: No focal or lateralizing signs. Cranial nerves 2 through 12 grossly 

within normal limits. 


PSYCH: Appropriate affect. Alert and oriented to person, place and time.


SKIN: Good skin turgor.  Well perfused.





ASSESSMENT: 


1.  Morbid obesity due to excess calories


2.  Body mass index of 57.7, initial to 53.3


3.  Gastroesophageal reflux disease


4.  Obstructive sleep apnea


5.  Hyperlipidemia


6.  Diabetes type 2, insulin dependent


7.  Osteoarthritis of the knees


8.  Osteoarthritis of the back


9.  Neuropathy


10.  Depressive disorder


11.  History of MRSA infection


12.  Post op nausea and vomiting


13.  Leukocystosis


14.  Iron deficiency anemia


15.  Vitamin D deficiency


16.  Zinc deficiency.


17.  Status post sleeve gastrectomy





PLAN: 


1.  Discharge instructions included no lifting over 4 pounds in 4 weeks d

escribed. 


2.  Recommend discontinue antihyperglycemic medication should her blood sugar 

drop under 100.  


3.  Will need adequate protein intake 75 g daily.


4.  Recommend IV fluid hydration at least 64 ounces reviewed. 


5.  Follow-up in 2 weeks.








Objective





- Vital Signs


Vital signs: 


                                 Intake & Output











 03/04/21 03/05/21 03/05/21





 18:59 06:59 18:59


 


Weight   145.15 kg

## 2021-03-07 ENCOUNTER — HOSPITAL ENCOUNTER (EMERGENCY)
Dept: HOSPITAL 47 - EC | Age: 56
LOS: 1 days | Discharge: HOME | End: 2021-03-08
Payer: MEDICARE

## 2021-03-07 VITALS — TEMPERATURE: 97.6 F

## 2021-03-07 DIAGNOSIS — Z90.49: ICD-10-CM

## 2021-03-07 DIAGNOSIS — Z96.1: ICD-10-CM

## 2021-03-07 DIAGNOSIS — Z80.49: ICD-10-CM

## 2021-03-07 DIAGNOSIS — Z79.899: ICD-10-CM

## 2021-03-07 DIAGNOSIS — Z88.2: ICD-10-CM

## 2021-03-07 DIAGNOSIS — Z79.4: ICD-10-CM

## 2021-03-07 DIAGNOSIS — Z98.41: ICD-10-CM

## 2021-03-07 DIAGNOSIS — E11.9: ICD-10-CM

## 2021-03-07 DIAGNOSIS — Z98.42: ICD-10-CM

## 2021-03-07 DIAGNOSIS — Z87.891: ICD-10-CM

## 2021-03-07 DIAGNOSIS — Z80.8: ICD-10-CM

## 2021-03-07 DIAGNOSIS — Z86.14: ICD-10-CM

## 2021-03-07 DIAGNOSIS — E86.0: Primary | ICD-10-CM

## 2021-03-07 DIAGNOSIS — I10: ICD-10-CM

## 2021-03-07 DIAGNOSIS — E78.5: ICD-10-CM

## 2021-03-07 DIAGNOSIS — Z82.49: ICD-10-CM

## 2021-03-07 DIAGNOSIS — Z98.84: ICD-10-CM

## 2021-03-07 DIAGNOSIS — Z90.710: ICD-10-CM

## 2021-03-07 LAB
ALBUMIN SERPL-MCNC: 3.9 G/DL (ref 3.5–5)
ALP SERPL-CCNC: 96 U/L (ref 38–126)
ALT SERPL-CCNC: 16 U/L (ref 4–34)
AMYLASE SERPL-CCNC: 40 U/L (ref 30–110)
ANION GAP SERPL CALC-SCNC: 19 MMOL/L
AST SERPL-CCNC: 24 U/L (ref 14–36)
BASOPHILS # BLD AUTO: 0 K/UL (ref 0–0.2)
BASOPHILS NFR BLD AUTO: 0 %
BUN SERPL-SCNC: 20 MG/DL (ref 7–17)
CALCIUM SPEC-MCNC: 9.1 MG/DL (ref 8.4–10.2)
CHLORIDE SERPL-SCNC: 102 MMOL/L (ref 98–107)
CK SERPL-CCNC: 54 U/L (ref 30–135)
CO2 SERPL-SCNC: 15 MMOL/L (ref 22–30)
EOSINOPHIL # BLD AUTO: 0.5 K/UL (ref 0–0.7)
EOSINOPHIL NFR BLD AUTO: 4 %
ERYTHROCYTE [DISTWIDTH] IN BLOOD BY AUTOMATED COUNT: 5.04 M/UL (ref 3.8–5.4)
ERYTHROCYTE [DISTWIDTH] IN BLOOD: 18.6 % (ref 11.5–15.5)
GLUCOSE SERPL-MCNC: 238 MG/DL (ref 74–99)
HCT VFR BLD AUTO: 41.6 % (ref 34–46)
HGB BLD-MCNC: 13.7 GM/DL (ref 11.4–16)
LIPASE SERPL-CCNC: 210 U/L (ref 23–300)
LYMPHOCYTES # SPEC AUTO: 2.8 K/UL (ref 1–4.8)
LYMPHOCYTES NFR SPEC AUTO: 23 %
MCH RBC QN AUTO: 27.1 PG (ref 25–35)
MCHC RBC AUTO-ENTMCNC: 32.9 G/DL (ref 31–37)
MCV RBC AUTO: 82.5 FL (ref 80–100)
MONOCYTES # BLD AUTO: 0.6 K/UL (ref 0–1)
MONOCYTES NFR BLD AUTO: 5 %
NEUTROPHILS # BLD AUTO: 8.2 K/UL (ref 1.3–7.7)
NEUTROPHILS NFR BLD AUTO: 66 %
PLATELET # BLD AUTO: 256 K/UL (ref 150–450)
POTASSIUM SERPL-SCNC: 4.2 MMOL/L (ref 3.5–5.1)
PROT SERPL-MCNC: 6.7 G/DL (ref 6.3–8.2)
SODIUM SERPL-SCNC: 136 MMOL/L (ref 137–145)
WBC # BLD AUTO: 12.4 K/UL (ref 3.8–10.6)

## 2021-03-07 PROCEDURE — 96374 THER/PROPH/DIAG INJ IV PUSH: CPT

## 2021-03-07 PROCEDURE — 80053 COMPREHEN METABOLIC PANEL: CPT

## 2021-03-07 PROCEDURE — 85025 COMPLETE CBC W/AUTO DIFF WBC: CPT

## 2021-03-07 PROCEDURE — 96361 HYDRATE IV INFUSION ADD-ON: CPT

## 2021-03-07 PROCEDURE — 36415 COLL VENOUS BLD VENIPUNCTURE: CPT

## 2021-03-07 PROCEDURE — 83605 ASSAY OF LACTIC ACID: CPT

## 2021-03-07 PROCEDURE — 99285 EMERGENCY DEPT VISIT HI MDM: CPT

## 2021-03-07 PROCEDURE — 93005 ELECTROCARDIOGRAM TRACING: CPT

## 2021-03-07 PROCEDURE — 96375 TX/PRO/DX INJ NEW DRUG ADDON: CPT

## 2021-03-07 PROCEDURE — 74018 RADEX ABDOMEN 1 VIEW: CPT

## 2021-03-07 PROCEDURE — 82150 ASSAY OF AMYLASE: CPT

## 2021-03-07 PROCEDURE — 82550 ASSAY OF CK (CPK): CPT

## 2021-03-07 PROCEDURE — 83690 ASSAY OF LIPASE: CPT

## 2021-03-07 NOTE — XR
EXAMINATION TYPE: XR KUB

 

DATE OF EXAM: 3/7/2021

 

COMPARISON: NONE

 

HISTORY: Abnormal pain. Gastric surgery.

 

TECHNIQUE: 2 views supine

 

FINDINGS: There is no sign of intestinal obstruction or pneumoperitoneum. Fecal pattern is normal. Th
ere is no evidence of a mass. There is some gas-filled small bowel in the mid abdomen. This could be 
minimal ileus. There are clips from cholecystectomy.

 

IMPRESSION: Possible minimal jejunal ileus. No free air.

## 2021-03-07 NOTE — ED
Dizziness HPI





- General


Chief Complaint: Dizziness


Stated Complaint: Dizziness


Time Seen by Provider: 21 22:29


Source: patient, RN notes reviewed, old records reviewed


Mode of arrival: ambulatory


Limitations: no limitations





- History of Present Illness


Initial Comments: 





This is a 55-year-old female DF for evaluation of dizziness dizziness lighthea

dedness especially change of position.  Patient feels very weak when she goes 

from positions of sitting or lying down to standing.  Patient also has 

complicated recent medical history of surgery, patient had a gastric bypass by 

Dr. Suarez a week ago.  Otherwise no travel show sick contacts no fevers.  

Patient has no postop complications at this point


MD Complaint: dizziness, lightheadedness, difficulty walking


-: hour(s)


Timing: gradual onset


Description: "room spinning", lightheadedness


History of Same: Yes


History of Trauma: No


Severity: moderate


Improves With: nothing


Worsens With: position, exertion


Associated Symptoms: weakness





- Related Data


                                Home Medications











 Medication  Instructions  Recorded  Confirmed


 


Gabapentin [Neurontin] 800 mg PO BID 18


 


Montelukast [Singulair] 10 mg PO DAILY 18


 


Pantoprazole Sodium [Protonix] 40 mg PO DAILY 18


 


amLODIPine [Norvasc] 10 mg PO DAILY 18


 


Mirabegron [Myrbetriq] 50 mg PO DAILY 20


 


PARoxetine HCL 30 mg PO DAILY 20


 


metFORMIN HCL [metFORMIN HCL ER] 1,000 mg PO AC-BID 20


 


Allopurinol [Zyloprim] 100 mg PO DAILY 21


 


Gabapentin [Neurontin] 400 mg PO 1200 21


 


Insulin Glargine,Hum.rec.anlog 50 unit SQ QAM 21





[Lantus Solostar]   


 


Loratadine 10 mg PO DAILY 21








                                  Previous Rx's











 Medication  Instructions  Recorded


 


Acetaminophen Oral Susp [Tylenol] 1,000 mg PO Q4-6H #400 ml 21


 


Omeprazole [PriLOSEC] 40 mg PO DAILY #30 capsule. 21


 


Ondansetron Odt [Zofran Odt] 4 mg PO Q8HR PRN #9 tab 21


 


Simethicone 40 mg/0.6 ml Drops 40 mg PO PCHS PRN #30 ml 21





[Mylicon Drops]  


 


bisacodyL [Dulcolax] 5 mg PO DAILY PRN #10 tablet. 21











                                    Allergies











Allergy/AdvReac Type Severity Reaction Status Date / Time


 


Sulfa (Sulfonamide Allergy  Anaphylaxis Verified 21 21:57





Antibiotics)     














Review of Systems


ROS Statement: 


Those systems with pertinent positive or pertinent negative responses have been 

documented in the HPI.





ROS Other: All systems not noted in ROS Statement are negative.





Past Medical History


Past Medical History: Diabetes Mellitus, Eye Disorder, Hyperlipidemia, 

Hypertension


Additional Past Medical History / Comment(s): 2/25/15  Pt presented to [facility] ER 

with substernal chest pain that started 24 hhours before coming to ER.  She 

noticed the chest pain when she woke up yesterday.  It is a heaviness and is 

intermittent with variable duration.  She also states she had alittle nausea 

with it.   Other HX:  Pt had recent (1/21/15)cataract surgery with lens implants

 bilaterally at [facility]-post op she had low O2 saturations and was 

told she had a very narrow airway-instead of going home same day, she was in the

 hospital for a couple days until her saturations improved.  She was discharged 

with home O2 which she wears at 2L/NC at nite and was to follow up today for 

testing for sleep apnea.  Pt states she also has diarrhea fairly frequently. 

Also has hx of 3 ruptured cervical discs with surgery and bilateral retina 

repair.  2018 - intermittently in hospital for 3 months for fluid on lungs, had 

fluid removed


History of Any Multi-Drug Resistant Organisms: MRSA


Date of last positivie culture/infection: 


MDRO Source:: legs and breasts.


Past Surgical History: Bariatric Surgery, Cholecystectomy, Hysterectomy, 

Orthopedic Surgery


Additional Past Surgical History / Comment(s): Bilateral cataract sx with lens 

implants, bilateral retinal repair. Cervical rodding for 3 ruptured discs.  

sleeve gastrectomy         3-1-21


Past Anesthesia/Blood Transfusion Reactions: Postoperative Nausea & Vomiting 

(PONV)


Additional Past Anesthesia/Blood Transfusion Reaction / Comment(s): Pt had 

cataract and lens implant at [facility] on 1/21/15 and afterwards she de

saturated and was hospitalized.  She was told she has a very narrow airway.  Pt 

has never recieved blood.


Past Psychological History: No Psychological Hx Reported


Smoking Status: Former smoker


Past Alcohol Use History: None Reported


Past Drug Use History: None Reported





- Past Family History


  ** Father


Family Medical History: Cancer, Myocardial Infarction (MI)


Additional Family Medical History / Comment(s): Father had 5 MI's and  of 

bone cancer.





  ** Mother


Family Medical History: Cancer


Additional Family Medical History / Comment(s): cervical cancer





General Exam


Limitations: no limitations


General appearance: alert, in no apparent distress


Head exam: Present: atraumatic, normocephalic, normal inspection


Eye exam: Present: normal appearance, PERRL, EOMI.  Absent: scleral icterus, 

conjunctival injection, periorbital swelling


ENT exam: Present: normal exam, mucous membranes moist


Neck exam: Present: normal inspection.  Absent: tenderness, meningismus, 

lymphadenopathy


Respiratory exam: Present: normal lung sounds bilaterally.  Absent: respiratory 

distress, wheezes, rales, rhonchi, stridor


Cardiovascular Exam: Present: regular rate, normal rhythm, normal heart sounds. 

 Absent: systolic murmur, diastolic murmur, rubs, gallop, clicks


GI/Abdominal exam: Present: soft, normal bowel sounds.  Absent: distended, 

tenderness, guarding, rebound, rigid


Extremities exam: Present: normal inspection, full ROM, normal capillary refill.

  Absent: tenderness, pedal edema, joint swelling, calf tenderness


Back exam: Present: normal inspection


Neurological exam: Present: alert, oriented X3, CN II-XII intact


Psychiatric exam: Present: normal affect, normal mood


Skin exam: Present: warm, dry, intact, normal color.  Absent: rash





Course


                                   Vital Signs











  21





  21:55


 


Temperature 97.6 F


 


Pulse Rate 88


 


Respiratory 18





Rate 


 


Blood Pressure 134/68


 


O2 Sat by Pulse 99





Oximetry 














- Reevaluation(s)


Reevaluation #1: 





21 00:46


Medical record is reviewed


Reevaluation #2: 





21 00:46


Patient symptoms are improved


Reevaluation #3: 





21 00:46


Patient feels okay for discharge home, informed of results





- Consultations


Consultation #1: 





spoke w DR Louise for Dr Dimas a patient to get hydration and discharged





EKG Findings





- EKG Comments:


EKG Findings:: EKG shows sinus rhythm 77 OH  192 QRS 86 QTc 434





Medical Decision Making





- Medical Decision Making





55 female with dizziness.  Patient has had dizziness for quite some time 

presents today with dizziness at this point patient has no significant 

complaints or findings feeling improved and can be discharged home





- Lab Data


Result diagrams: 


                                 21 22:51





                                 21 22:51


                                   Lab Results











  21 Range/Units





  22:51 22:51 22:51 


 


WBC  12.4 H    (3.8-10.6)  k/uL


 


RBC  5.04    (3.80-5.40)  m/uL


 


Hgb  13.7    (11.4-16.0)  gm/dL


 


Hct  41.6    (34.0-46.0)  %


 


MCV  82.5    (80.0-100.0)  fL


 


MCH  27.1    (25.0-35.0)  pg


 


MCHC  32.9    (31.0-37.0)  g/dL


 


RDW  18.6 H    (11.5-15.5)  %


 


Plt Count  256    (150-450)  k/uL


 


MPV  8.4    


 


Neutrophils %  66    %


 


Lymphocytes %  23    %


 


Monocytes %  5    %


 


Eosinophils %  4    %


 


Basophils %  0    %


 


Neutrophils #  8.2 H    (1.3-7.7)  k/uL


 


Lymphocytes #  2.8    (1.0-4.8)  k/uL


 


Monocytes #  0.6    (0-1.0)  k/uL


 


Eosinophils #  0.5    (0-0.7)  k/uL


 


Basophils #  0.0    (0-0.2)  k/uL


 


Anisocytosis  Slight    


 


Microcytosis  Slight    


 


Sodium   136 L   (137-145)  mmol/L


 


Potassium   4.2   (3.5-5.1)  mmol/L


 


Chloride   102   ()  mmol/L


 


Carbon Dioxide   15 L   (22-30)  mmol/L


 


Anion Gap   19   mmol/L


 


BUN   20 H   (7-17)  mg/dL


 


Creatinine   0.86   (0.52-1.04)  mg/dL


 


Est GFR (CKD-EPI)AfAm   89   (>60 ml/min/1.73 sqM)  


 


Est GFR (CKD-EPI)NonAf   77   (>60 ml/min/1.73 sqM)  


 


Glucose   238 H   (74-99)  mg/dL


 


Plasma Lactic Acid Dilan    1.2  (0.7-2.0)  mmol/L


 


Calcium   9.1   (8.4-10.2)  mg/dL


 


Total Bilirubin   0.5   (0.2-1.3)  mg/dL


 


AST   24   (14-36)  U/L


 


ALT   16   (4-34)  U/L


 


Alkaline Phosphatase   96   ()  U/L


 


Creatine Kinase   54   ()  U/L


 


Total Protein   6.7   (6.3-8.2)  g/dL


 


Albumin   3.9   (3.5-5.0)  g/dL


 


Amylase   40   ()  U/L


 


Lipase   210   ()  U/L














- Radiology Data


Radiology results: report reviewed (X-ray KUB is negative for acute disease), 

image reviewed





Disposition


Clinical Impression: 


 Dehydration, Dizziness





Disposition: HOME SELF-CARE


Condition: Good


Instructions (If sedation given, give patient instructions):  Dizziness (ED)


Is patient prescribed a controlled substance at d/c from ED?: No


Referrals: 


Rosas Higginbotham MD [Primary Care Provider] - 1-2 days

## 2021-03-08 ENCOUNTER — HOSPITAL ENCOUNTER (INPATIENT)
Dept: HOSPITAL 47 - EC | Age: 56
LOS: 3 days | Discharge: HOME | DRG: 638 | End: 2021-03-11
Attending: INTERNAL MEDICINE | Admitting: INTERNAL MEDICINE
Payer: MEDICARE

## 2021-03-08 VITALS — BODY MASS INDEX: 47.3 KG/M2

## 2021-03-08 VITALS — DIASTOLIC BLOOD PRESSURE: 68 MMHG | HEART RATE: 79 BPM | SYSTOLIC BLOOD PRESSURE: 115 MMHG | RESPIRATION RATE: 16 BRPM

## 2021-03-08 DIAGNOSIS — E86.0: ICD-10-CM

## 2021-03-08 DIAGNOSIS — E66.01: ICD-10-CM

## 2021-03-08 DIAGNOSIS — M1A.9XX0: ICD-10-CM

## 2021-03-08 DIAGNOSIS — F33.9: ICD-10-CM

## 2021-03-08 DIAGNOSIS — Z79.4: ICD-10-CM

## 2021-03-08 DIAGNOSIS — E11.00: Primary | ICD-10-CM

## 2021-03-08 DIAGNOSIS — N39.0: ICD-10-CM

## 2021-03-08 DIAGNOSIS — Z87.891: ICD-10-CM

## 2021-03-08 DIAGNOSIS — I10: ICD-10-CM

## 2021-03-08 DIAGNOSIS — K21.9: ICD-10-CM

## 2021-03-08 DIAGNOSIS — Z98.84: ICD-10-CM

## 2021-03-08 DIAGNOSIS — E78.5: ICD-10-CM

## 2021-03-08 DIAGNOSIS — Z79.899: ICD-10-CM

## 2021-03-08 DIAGNOSIS — Z88.2: ICD-10-CM

## 2021-03-08 DIAGNOSIS — E11.40: ICD-10-CM

## 2021-03-08 DIAGNOSIS — I95.1: ICD-10-CM

## 2021-03-08 DIAGNOSIS — Z20.822: ICD-10-CM

## 2021-03-08 LAB
ALBUMIN SERPL-MCNC: 4.2 G/DL (ref 3.5–5)
ALP SERPL-CCNC: 100 U/L (ref 38–126)
ALT SERPL-CCNC: 17 U/L (ref 4–34)
ANION GAP SERPL CALC-SCNC: 17 MMOL/L
ANION GAP SERPL CALC-SCNC: 22 MMOL/L
APTT BLD: 22 SEC (ref 22–30)
AST SERPL-CCNC: 23 U/L (ref 14–36)
BASOPHILS # BLD AUTO: 0 K/UL (ref 0–0.2)
BASOPHILS NFR BLD AUTO: 0 %
BUN SERPL-SCNC: 18 MG/DL (ref 7–17)
CALCIUM SPEC-MCNC: 9 MG/DL (ref 8.4–10.2)
CHLORIDE SERPL-SCNC: 102 MMOL/L (ref 98–107)
CHLORIDE SERPL-SCNC: 108 MMOL/L (ref 98–107)
CO2 SERPL-SCNC: 11 MMOL/L (ref 22–30)
CO2 SERPL-SCNC: 14 MMOL/L (ref 22–30)
EOSINOPHIL # BLD AUTO: 0.3 K/UL (ref 0–0.7)
EOSINOPHIL NFR BLD AUTO: 2 %
ERYTHROCYTE [DISTWIDTH] IN BLOOD BY AUTOMATED COUNT: 5.24 M/UL (ref 3.8–5.4)
ERYTHROCYTE [DISTWIDTH] IN BLOOD: 18.5 % (ref 11.5–15.5)
GLUCOSE BLD-MCNC: 105 MG/DL (ref 75–99)
GLUCOSE BLD-MCNC: 113 MG/DL (ref 75–99)
GLUCOSE BLD-MCNC: 134 MG/DL (ref 75–99)
GLUCOSE BLD-MCNC: 203 MG/DL (ref 75–99)
GLUCOSE BLD-MCNC: 90 MG/DL (ref 75–99)
GLUCOSE SERPL-MCNC: 100 MG/DL (ref 74–99)
GLUCOSE SERPL-MCNC: 237 MG/DL (ref 74–99)
GLUCOSE UR QL: (no result)
HCT VFR BLD AUTO: 43.5 % (ref 34–46)
HGB BLD-MCNC: 14.4 GM/DL (ref 11.4–16)
HYALINE CASTS UR QL AUTO: 3 /LPF (ref 0–2)
INR PPP: 0.9 (ref ?–1.2)
KETONES UR QL STRIP.AUTO: (no result)
LYMPHOCYTES # SPEC AUTO: 2.4 K/UL (ref 1–4.8)
LYMPHOCYTES NFR SPEC AUTO: 18 %
MAGNESIUM SPEC-SCNC: 2.1 MG/DL (ref 1.6–2.3)
MCH RBC QN AUTO: 27.5 PG (ref 25–35)
MCHC RBC AUTO-ENTMCNC: 33.2 G/DL (ref 31–37)
MCV RBC AUTO: 82.9 FL (ref 80–100)
MONOCYTES # BLD AUTO: 0.5 K/UL (ref 0–1)
MONOCYTES NFR BLD AUTO: 3 %
NEUTROPHILS # BLD AUTO: 10.2 K/UL (ref 1.3–7.7)
NEUTROPHILS NFR BLD AUTO: 75 %
PH UR: 5.5 [PH] (ref 5–8)
PLATELET # BLD AUTO: 239 K/UL (ref 150–450)
POTASSIUM SERPL-SCNC: 4.2 MMOL/L (ref 3.5–5.1)
POTASSIUM SERPL-SCNC: 4.6 MMOL/L (ref 3.5–5.1)
PROT SERPL-MCNC: 6.9 G/DL (ref 6.3–8.2)
PROT UR QL: (no result)
PT BLD: 9.9 SEC (ref 9–12)
RBC UR QL: 55 /HPF (ref 0–5)
SODIUM SERPL-SCNC: 136 MMOL/L (ref 137–145)
SODIUM SERPL-SCNC: 138 MMOL/L (ref 137–145)
SP GR UR: 1.02 (ref 1–1.03)
SQUAMOUS UR QL AUTO: 3 /HPF (ref 0–4)
UROBILINOGEN UR QL STRIP: <2 MG/DL (ref ?–2)
WBC # BLD AUTO: 13.5 K/UL (ref 3.8–10.6)
WBC # UR AUTO: >182 /HPF (ref 0–5)

## 2021-03-08 PROCEDURE — 93005 ELECTROCARDIOGRAM TRACING: CPT

## 2021-03-08 PROCEDURE — 81001 URINALYSIS AUTO W/SCOPE: CPT

## 2021-03-08 PROCEDURE — 83735 ASSAY OF MAGNESIUM: CPT

## 2021-03-08 PROCEDURE — 85025 COMPLETE CBC W/AUTO DIFF WBC: CPT

## 2021-03-08 PROCEDURE — 96361 HYDRATE IV INFUSION ADD-ON: CPT

## 2021-03-08 PROCEDURE — 80051 ELECTROLYTE PANEL: CPT

## 2021-03-08 PROCEDURE — 84484 ASSAY OF TROPONIN QUANT: CPT

## 2021-03-08 PROCEDURE — 96374 THER/PROPH/DIAG INJ IV PUSH: CPT

## 2021-03-08 PROCEDURE — 85027 COMPLETE CBC AUTOMATED: CPT

## 2021-03-08 PROCEDURE — 82947 ASSAY GLUCOSE BLOOD QUANT: CPT

## 2021-03-08 PROCEDURE — 87086 URINE CULTURE/COLONY COUNT: CPT

## 2021-03-08 PROCEDURE — 83605 ASSAY OF LACTIC ACID: CPT

## 2021-03-08 PROCEDURE — 80053 COMPREHEN METABOLIC PANEL: CPT

## 2021-03-08 PROCEDURE — 99285 EMERGENCY DEPT VISIT HI MDM: CPT

## 2021-03-08 PROCEDURE — 36415 COLL VENOUS BLD VENIPUNCTURE: CPT

## 2021-03-08 PROCEDURE — 87635 SARS-COV-2 COVID-19 AMP PRB: CPT

## 2021-03-08 PROCEDURE — 85730 THROMBOPLASTIN TIME PARTIAL: CPT

## 2021-03-08 PROCEDURE — 84100 ASSAY OF PHOSPHORUS: CPT

## 2021-03-08 PROCEDURE — 85610 PROTHROMBIN TIME: CPT

## 2021-03-08 PROCEDURE — 82565 ASSAY OF CREATININE: CPT

## 2021-03-08 PROCEDURE — 80048 BASIC METABOLIC PNL TOTAL CA: CPT

## 2021-03-08 RX ADMIN — DEXTROSE MONOHYDRATE, SODIUM CHLORIDE, AND POTASSIUM CHLORIDE SCH MLS/HR: 50; 4.5; 1.49 INJECTION, SOLUTION INTRAVENOUS at 18:44

## 2021-03-08 RX ADMIN — CEFAZOLIN SCH MLS/HR: 330 INJECTION, POWDER, FOR SOLUTION INTRAMUSCULAR; INTRAVENOUS at 17:47

## 2021-03-08 RX ADMIN — INSULIN HUMAN SCH MLS/HR: 100 INJECTION, SOLUTION PARENTERAL at 17:59

## 2021-03-08 RX ADMIN — GABAPENTIN SCH MG: 400 CAPSULE ORAL at 23:11

## 2021-03-08 NOTE — ED
General Adult HPI





- General


Chief complaint: Dizziness


Stated complaint: dizziness


Time Seen by Provider: 21 15:31


Source: patient, RN notes reviewed, old records reviewed


Mode of arrival: wheelchair


Limitations: no limitations





- History of Present Illness


Initial comments: 





55-year-old female presenting for evaluation of lightheadedness, dizziness.  

Symptoms 7 present for the past several days.  She was seen in the emergency 

department yesterday and had been discharged home feeling somewhat better.  She 

states she has been drinking but had recent bariatric surgery and her intake has

been limited.  She denies worsening abdominal pain.  She denies fever.  She has 

had no dysuria.  She reports good urine output and has not had any bowel moveme

nts, no diarrhea.





No chest pain or dyspnea.  No focal numbness or weakness.





- Related Data


                                Home Medications











 Medication  Instructions  Recorded  Confirmed


 


Gabapentin [Neurontin] 800 mg PO BID 18


 


Montelukast [Singulair] 10 mg PO DAILY 18


 


Pantoprazole Sodium [Protonix] 40 mg PO DAILY 18


 


amLODIPine [Norvasc] 10 mg PO DAILY 18


 


Mirabegron [Myrbetriq] 50 mg PO DAILY 20


 


PARoxetine HCL 30 mg PO DAILY 20


 


metFORMIN HCL [metFORMIN HCL ER] 1,000 mg PO AC-BID 20


 


Allopurinol [Zyloprim] 100 mg PO DAILY 21


 


Gabapentin [Neurontin] 400 mg PO 1200 21


 


Insulin Glargine,Hum.rec.anlog 50 unit SQ QAM 21





[Lantus Solostar]   


 


Loratadine 10 mg PO DAILY 21








                                  Previous Rx's











 Medication  Instructions  Recorded


 


Acetaminophen Oral Susp [Tylenol] 1,000 mg PO Q4-6H #400 ml 21


 


Omeprazole [PriLOSEC] 40 mg PO DAILY #30 capsule. 21


 


Ondansetron Odt [Zofran Odt] 4 mg PO Q8HR PRN #9 tab 21


 


Simethicone 40 mg/0.6 ml Drops 40 mg PO PCHS PRN #30 ml 21





[Mylicon Drops]  


 


bisacodyL [Dulcolax] 5 mg PO DAILY PRN #10 tablet. 21











                                    Allergies











Allergy/AdvReac Type Severity Reaction Status Date / Time


 


Sulfa (Sulfonamide Allergy  Anaphylaxis Verified 21 14:41





Antibiotics)     














Review of Systems


ROS Statement: 


Those systems with pertinent positive or pertinent negative responses have been 

documented in the HPI.





ROS Other: All systems not noted in ROS Statement are negative.





Past Medical History


Past Medical History: Diabetes Mellitus, Eye Disorder, Hyperlipidemia, 

Hypertension


Additional Past Medical History / Comment(s): 2/25/15  Pt presented to [facility] ER 

with substernal chest pain that started 24 hhours before coming to ER.  She 

noticed the chest pain when she woke up yesterday.  It is a heaviness and is 

intermittent with variable duration.  She also states she had alittle nausea 

with it.   Other HX:  Pt had recent (1/21/15)cataract surgery with lens implants

 bilaterally at [facility]-post op she had low O2 saturations and was 

told she had a very narrow airway-instead of going home same day, she was in the

 hospital for a couple days until her saturations improved.  She was discharged 

with home O2 which she wears at 2L/NC at [facility] and was to follow up today for 

testing for sleep apnea.  Pt states she also has diarrhea fairly frequently. 

Also has hx of 3 ruptured cervical discs with surgery and bilateral retina 

repair.  2018 - intermittently in hospital for 3 months for fluid on lungs, had 

fluid removed


History of Any Multi-Drug Resistant Organisms: MRSA


Date of last positivie culture/infection: 


MDRO Source:: legs and breasts.


Past Surgical History: Bariatric Surgery, Cholecystectomy, Hysterectomy, 

Orthopedic Surgery


Additional Past Surgical History / Comment(s): Bilateral cataract sx with lens 

implants, bilateral retinal repair. Cervical rodding for 3 ruptured discs.  

sleeve gastrectomy         3-1-21


Past Anesthesia/Blood Transfusion Reactions: Postoperative Nausea & Vomiting (PO

NV)


Additional Past Anesthesia/Blood Transfusion Reaction / Comment(s): Pt had 

cataract and lens implant at [facility] on 1/21/15 and afterwards she 

desaturated and was hospitalized.  She was told she has a very narrow airway.  

Pt has never recieved blood.


Past Psychological History: No Psychological Hx Reported


Smoking Status: Former smoker


Past Alcohol Use History: None Reported


Past Drug Use History: None Reported





- Past Family History


  ** Father


Family Medical History: Cancer, Myocardial Infarction (MI)


Additional Family Medical History / Comment(s): Father had 5 MI's and  of 

bone cancer.





  ** Mother


Family Medical History: Cancer


Additional Family Medical History / Comment(s): cervical cancer





General Exam


Limitations: no limitations


General appearance: alert, in no apparent distress


Head exam: Present: atraumatic, normocephalic


Eye exam: Present: normal appearance, PERRL


ENT exam: Present: mucous membranes dry


Neck exam: Present: normal inspection


Respiratory exam: Present: normal lung sounds bilaterally.  Absent: respiratory 

distress, wheezes


Cardiovascular Exam: Present: regular rate, normal rhythm


GI/Abdominal exam: Present: soft, tenderness (Mild postoperative tenderness,).  

Absent: distended, guarding, rebound


Extremities exam: Present: normal inspection, normal capillary refill.  Absent: 

pedal edema


Neurological exam: Present: alert, oriented X3, CN II-XII intact.  Absent: motor

 sensory deficit


Psychiatric exam: Present: normal affect, normal mood


Skin exam: Present: warm, dry, intact





Course


                                   Vital Signs











  21





  14:39 16:27


 


Temperature 98.0 F 


 


Pulse Rate 88 


 


Pulse Rate [  82





Sitting Pulse  





Oximetery]  


 


Pulse Rate [  99





Standing Pulse  





Oximetery]  


 


Pulse Rate [  78





Supine Pulse  





Oximetery]  


 


Respiratory 18 





Rate  


 


Blood Pressure 128/69 


 


Blood Pressure  109/51





[Right Arm  





Sitting]  


 


Blood Pressure  107/58





[Right Arm  





Standing]  


 


Blood Pressure  127/60





[Right Arm  





Supine]  


 


O2 Sat by Pulse 100 





Oximetry  














EKG Findings





- EKG Comments:


EKG Findings:: EKG: Sinus rhythm with arrhythmia, rate of 75, KY interval 176, 

QRS duration 88, 





Medical Decision Making





- Medical Decision Making





55-year-old female presenting for evaluation of lightheadedness, dehydration.  

Patient does appear dehydrated, she has orthostatic positive.  She has mild 

leukocytosis, stable hemoglobin.  CMP showing an anion gap of 22 and a CO2 of 14

with a mildly elevated blood glucose.  This is consistent with a combination of 

likely diabetic ketoacidosis and the starvation ketosis.  There is no concurrent

lactic acidosis.  Urinalysis shows 4+ glucose, 4+ ketones, as well as signs of 

UTI.  She is started on IV fluid, IV antibiotics and IV insulin.  She will be 

admitted to Dr. Rodgers who is aware of the patient.





- Lab Data


Result diagrams: 


                                 21 16:25





                                 21 16:25


                                   Lab Results











  21 Range/Units





  16:11 16:25 16:25 


 


WBC   13.5 H   (3.8-10.6)  k/uL


 


RBC   5.24   (3.80-5.40)  m/uL


 


Hgb   14.4   (11.4-16.0)  gm/dL


 


Hct   43.5   (34.0-46.0)  %


 


MCV   82.9   (80.0-100.0)  fL


 


MCH   27.5   (25.0-35.0)  pg


 


MCHC   33.2   (31.0-37.0)  g/dL


 


RDW   18.5 H   (11.5-15.5)  %


 


Plt Count   239   (150-450)  k/uL


 


MPV   7.6   


 


Neutrophils %   75   %


 


Lymphocytes %   18   %


 


Monocytes %   3   %


 


Eosinophils %   2   %


 


Basophils %   0   %


 


Neutrophils #   10.2 H   (1.3-7.7)  k/uL


 


Lymphocytes #   2.4   (1.0-4.8)  k/uL


 


Monocytes #   0.5   (0-1.0)  k/uL


 


Eosinophils #   0.3   (0-0.7)  k/uL


 


Basophils #   0.0   (0-0.2)  k/uL


 


Poikilocytosis   Slight   


 


Anisocytosis   Slight   


 


Microcytosis   Slight   


 


PT    9.9  (9.0-12.0)  sec


 


INR    0.9  (<1.2)  


 


APTT    22.0  (22.0-30.0)  sec


 


Sodium     (137-145)  mmol/L


 


Potassium     (3.5-5.1)  mmol/L


 


Chloride     ()  mmol/L


 


Carbon Dioxide     (22-30)  mmol/L


 


Anion Gap     mmol/L


 


BUN     (7-17)  mg/dL


 


Creatinine     (0.52-1.04)  mg/dL


 


Est GFR (CKD-EPI)AfAm     (>60 ml/min/1.73 sqM)  


 


Est GFR (CKD-EPI)NonAf     (>60 ml/min/1.73 sqM)  


 


Glucose     (74-99)  mg/dL


 


Plasma Lactic Acid Dilan     (0.7-2.0)  mmol/L


 


Calcium     (8.4-10.2)  mg/dL


 


Magnesium     (1.6-2.3)  mg/dL


 


Total Bilirubin     (0.2-1.3)  mg/dL


 


AST     (14-36)  U/L


 


ALT     (4-34)  U/L


 


Alkaline Phosphatase     ()  U/L


 


Troponin I     (0.000-0.034)  ng/mL


 


Total Protein     (6.3-8.2)  g/dL


 


Albumin     (3.5-5.0)  g/dL


 


Urine Color  Light Yellow    


 


Urine Appearance  Cloudy H    (Clear)  


 


Urine pH  5.5    (5.0-8.0)  


 


Ur Specific Gravity  1.019    (1.001-1.035)  


 


Urine Protein  1+ H    (Negative)  


 


Urine Glucose (UA)  4+ H    (Negative)  


 


Urine Ketones  4+ H    (Negative)  


 


Urine Blood  Moderate H    (Negative)  


 


Urine Nitrite  Negative    (Negative)  


 


Urine Bilirubin  Negative    (Negative)  


 


Urine Urobilinogen  <2.0    (<2.0)  mg/dL


 


Ur Leukocyte Esterase  Large H    (Negative)  


 


Urine RBC  55 H    (0-5)  /hpf


 


Urine WBC  >182 H    (0-5)  /hpf


 


Urine WBC Clumps  Many H    (None)  /hpf


 


Ur Squamous Epith Cells  3    (0-4)  /hpf


 


Hyaline Casts  3 H    (0-2)  /lpf


 


Urine Yeast (Budding)  Many H    (None)  /hpf














  21 Range/Units





  16:25 16:25 16:25 


 


WBC     (3.8-10.6)  k/uL


 


RBC     (3.80-5.40)  m/uL


 


Hgb     (11.4-16.0)  gm/dL


 


Hct     (34.0-46.0)  %


 


MCV     (80.0-100.0)  fL


 


MCH     (25.0-35.0)  pg


 


MCHC     (31.0-37.0)  g/dL


 


RDW     (11.5-15.5)  %


 


Plt Count     (150-450)  k/uL


 


MPV     


 


Neutrophils %     %


 


Lymphocytes %     %


 


Monocytes %     %


 


Eosinophils %     %


 


Basophils %     %


 


Neutrophils #     (1.3-7.7)  k/uL


 


Lymphocytes #     (1.0-4.8)  k/uL


 


Monocytes #     (0-1.0)  k/uL


 


Eosinophils #     (0-0.7)  k/uL


 


Basophils #     (0-0.2)  k/uL


 


Poikilocytosis     


 


Anisocytosis     


 


Microcytosis     


 


PT     (9.0-12.0)  sec


 


INR     (<1.2)  


 


APTT     (22.0-30.0)  sec


 


Sodium  138    (137-145)  mmol/L


 


Potassium  4.2    (3.5-5.1)  mmol/L


 


Chloride  102    ()  mmol/L


 


Carbon Dioxide  14 L    (22-30)  mmol/L


 


Anion Gap  22    mmol/L


 


BUN  18 H    (7-17)  mg/dL


 


Creatinine  0.84    (0.52-1.04)  mg/dL


 


Est GFR (CKD-EPI)AfAm  >90    (>60 ml/min/1.73 sqM)  


 


Est GFR (CKD-EPI)NonAf  79    (>60 ml/min/1.73 sqM)  


 


Glucose  237 H    (74-99)  mg/dL


 


Plasma Lactic Acid Dilan   0.9   (0.7-2.0)  mmol/L


 


Calcium  9.0    (8.4-10.2)  mg/dL


 


Magnesium  2.1    (1.6-2.3)  mg/dL


 


Total Bilirubin  0.5    (0.2-1.3)  mg/dL


 


AST  23    (14-36)  U/L


 


ALT  17    (4-34)  U/L


 


Alkaline Phosphatase  100    ()  U/L


 


Troponin I    <0.012  (0.000-0.034)  ng/mL


 


Total Protein  6.9    (6.3-8.2)  g/dL


 


Albumin  4.2    (3.5-5.0)  g/dL


 


Urine Color     


 


Urine Appearance     (Clear)  


 


Urine pH     (5.0-8.0)  


 


Ur Specific Gravity     (1.001-1.035)  


 


Urine Protein     (Negative)  


 


Urine Glucose (UA)     (Negative)  


 


Urine Ketones     (Negative)  


 


Urine Blood     (Negative)  


 


Urine Nitrite     (Negative)  


 


Urine Bilirubin     (Negative)  


 


Urine Urobilinogen     (<2.0)  mg/dL


 


Ur Leukocyte Esterase     (Negative)  


 


Urine RBC     (0-5)  /hpf


 


Urine WBC     (0-5)  /hpf


 


Urine WBC Clumps     (None)  /hpf


 


Ur Squamous Epith Cells     (0-4)  /hpf


 


Hyaline Casts     (0-2)  /lpf


 


Urine Yeast (Budding)     (None)  /hpf














Disposition


Clinical Impression: 


 Dehydration, DKA (diabetic ketoacidoses), UTI (urinary tract infection)





Disposition: ADMITTED AS IP TO THIS HOSP


Condition: Stable


Is patient prescribed a controlled substance at d/c from ED?: No


Referrals: 


Rosas Higginbotham MD [Primary Care Provider] - 1-2 days


Decision Date: 21


Decision Time: 17:29

## 2021-03-09 LAB
ANION GAP SERPL CALC-SCNC: 14 MMOL/L
ANION GAP SERPL CALC-SCNC: 16 MMOL/L
BUN SERPL-SCNC: 13 MG/DL (ref 7–17)
CALCIUM SPEC-MCNC: 8.9 MG/DL (ref 8.4–10.2)
CHLORIDE SERPL-SCNC: 106 MMOL/L (ref 98–107)
CHLORIDE SERPL-SCNC: 107 MMOL/L (ref 98–107)
CO2 SERPL-SCNC: 15 MMOL/L (ref 22–30)
CO2 SERPL-SCNC: 15 MMOL/L (ref 22–30)
ERYTHROCYTE [DISTWIDTH] IN BLOOD BY AUTOMATED COUNT: 5.12 M/UL (ref 3.8–5.4)
ERYTHROCYTE [DISTWIDTH] IN BLOOD: 18.6 % (ref 11.5–15.5)
GLUCOSE BLD-MCNC: 121 MG/DL (ref 75–99)
GLUCOSE BLD-MCNC: 140 MG/DL (ref 75–99)
GLUCOSE BLD-MCNC: 179 MG/DL (ref 75–99)
GLUCOSE BLD-MCNC: 186 MG/DL (ref 75–99)
GLUCOSE BLD-MCNC: 190 MG/DL (ref 75–99)
GLUCOSE BLD-MCNC: 193 MG/DL (ref 75–99)
GLUCOSE BLD-MCNC: 198 MG/DL (ref 75–99)
GLUCOSE BLD-MCNC: 204 MG/DL (ref 75–99)
GLUCOSE BLD-MCNC: 208 MG/DL (ref 75–99)
GLUCOSE BLD-MCNC: 210 MG/DL (ref 75–99)
GLUCOSE SERPL-MCNC: 126 MG/DL (ref 74–99)
GLUCOSE SERPL-MCNC: 221 MG/DL (ref 74–99)
HCT VFR BLD AUTO: 43 % (ref 34–46)
HGB BLD-MCNC: 13.9 GM/DL (ref 11.4–16)
MCH RBC QN AUTO: 27.2 PG (ref 25–35)
MCHC RBC AUTO-ENTMCNC: 32.4 G/DL (ref 31–37)
MCV RBC AUTO: 84.1 FL (ref 80–100)
PLATELET # BLD AUTO: 226 K/UL (ref 150–450)
POTASSIUM SERPL-SCNC: 4 MMOL/L (ref 3.5–5.1)
POTASSIUM SERPL-SCNC: 4.1 MMOL/L (ref 3.5–5.1)
SODIUM SERPL-SCNC: 136 MMOL/L (ref 137–145)
SODIUM SERPL-SCNC: 137 MMOL/L (ref 137–145)
WBC # BLD AUTO: 10.5 K/UL (ref 3.8–10.6)

## 2021-03-09 RX ADMIN — INSULIN ASPART SCH UNIT: 100 INJECTION, SOLUTION INTRAVENOUS; SUBCUTANEOUS at 12:56

## 2021-03-09 RX ADMIN — LORATADINE SCH MG: 10 TABLET ORAL at 10:20

## 2021-03-09 RX ADMIN — INSULIN HUMAN SCH MLS/HR: 100 INJECTION, SOLUTION PARENTERAL at 07:28

## 2021-03-09 RX ADMIN — PANTOPRAZOLE SODIUM SCH MG: 40 TABLET, DELAYED RELEASE ORAL at 10:15

## 2021-03-09 RX ADMIN — INSULIN ASPART SCH UNIT: 100 INJECTION, SOLUTION INTRAVENOUS; SUBCUTANEOUS at 17:54

## 2021-03-09 RX ADMIN — ASPIRIN 81 MG CHEWABLE TABLET SCH MG: 81 TABLET CHEWABLE at 10:15

## 2021-03-09 RX ADMIN — NYSTATIN SCH: 100000 CREAM TOPICAL at 10:17

## 2021-03-09 RX ADMIN — NYSTATIN SCH: 100000 CREAM TOPICAL at 21:59

## 2021-03-09 RX ADMIN — GABAPENTIN SCH MG: 400 CAPSULE ORAL at 17:54

## 2021-03-09 RX ADMIN — GABAPENTIN SCH MG: 400 CAPSULE ORAL at 22:45

## 2021-03-09 RX ADMIN — DEXTROSE MONOHYDRATE, SODIUM CHLORIDE, AND POTASSIUM CHLORIDE SCH MLS/HR: 50; 4.5; 1.49 INJECTION, SOLUTION INTRAVENOUS at 07:31

## 2021-03-09 RX ADMIN — INSULIN DETEMIR SCH UNIT: 100 INJECTION, SOLUTION SUBCUTANEOUS at 10:15

## 2021-03-09 RX ADMIN — GABAPENTIN SCH MG: 400 CAPSULE ORAL at 10:15

## 2021-03-09 RX ADMIN — INSULIN HUMAN SCH: 100 INJECTION, SOLUTION PARENTERAL at 10:16

## 2021-03-09 RX ADMIN — CEFAZOLIN SCH MLS/HR: 330 INJECTION, POWDER, FOR SOLUTION INTRAMUSCULAR; INTRAVENOUS at 10:16

## 2021-03-09 RX ADMIN — MONTELUKAST SODIUM SCH MG: 10 TABLET, FILM COATED ORAL at 20:38

## 2021-03-09 RX ADMIN — INSULIN ASPART SCH UNIT: 100 INJECTION, SOLUTION INTRAVENOUS; SUBCUTANEOUS at 20:38

## 2021-03-09 RX ADMIN — ASPIRIN SCH: 325 TABLET ORAL at 10:17

## 2021-03-09 RX ADMIN — ATORVASTATIN CALCIUM SCH MG: 20 TABLET, FILM COATED ORAL at 10:15

## 2021-03-09 RX ADMIN — CEFAZOLIN SCH: 330 INJECTION, POWDER, FOR SOLUTION INTRAMUSCULAR; INTRAVENOUS at 07:31

## 2021-03-09 NOTE — P.HPIM
History of Present Illness


H&P Date: 21


Chief Complaint: Dizziness





HISTORY OF PRESENT ILLNESS


This is a 55-year-old  female patient of Dr. Higginbotham and Dr. ZAHRA Mcneil 

with past medical history of hypertension, hyperlipidemia, diabetes mellitus 

type 2, diabetic neuropathy, chronic gout, recurrent depression, 

gastroesophageal reflux disease, morbid obesity with BMI of 47.  On , 

patient underwent robotic-assisted da Fidencio laparoscopic sleeve gastrectomy with

Dr. Dimas.  Patient presented to the ER on  with complaints of 

dizziness and lightheadedness especially when changing positions.  Patient was 

discharged home.  She returned to the emergency center on  again with 

dizziness lightheadedness.  She has had decreased oral intake due to recent 

gastric surgery.  She does complain of abdominal pain.  No fever or chills.  No 

diarrhea.  WBC was 13.5, hemoglobin 14.4 platelet count 239.  Sodium 138, 

potassium 4.2, chloride 102, CO2 14, BUN 18 and creatinine 0.84.  Blood sugar 

237.  Troponin was negative.  Urinalysis was blood moderate, nitrate negative 

leukoesterase large, RBCs 55, WBCs greater than 182.  Patient was started on IV 

antibiotics and admitted to the [facility] floor on insulin drip and protocol.  

This morning, patient states that she still has dizziness about the same as 

yesterday.  Her blood sugars are running 179-221.  Orthostatics were positive 

yesterday and will be rechecked.





REVIEW OF SYSTEMS


Constitutional: No fever, no chills, no night sweats.  No weight change.  No 

weakness, fatigue or lethargy.  No daytime sleepiness.


EENT: No headache.  No blurred vision or double vision, no loss of vision.  No 

loss of Hearing, no ringing in the ears, no dizziness.  No nasal drainage or 

congestion.  No epistaxis.  No sore throat.


Lungs: No shortness of breath, cough, no sputum production.  No wheezing.


Cardiovascular: No chest pain, no lower extremity edema.  No palpitations.  No 

paroxysmal nocturnal dyspnea.  No orthopnea.  Reports lightheadedness and 

dizziness.  No syncopal episodes.


Abdominal: No abdominal pain.  No nausea, vomiting.  No diarrhea.  No 

constipation.  No bloody or tarry stools..  No loss of appetite.


Genitourinary: No dysuria, increased frequency, urgency.  No urinary retention.


Musculoskeletal: No myalgias.  No muscle weakness, no gait dysfunction, no 

frequent falls.  No back pain.  No neck pain.


Integumentary: No wounds, no lesions.  No rash or pruritus.  No unusual 

bruising.  No change in hair or nails.


Neurologic: No aphasia. No facial droop. No change in mentation. No head injury.

No headache. No paralysis. No paresthesia.


Psychiatric: No depression.  No anxiety.  No mood swings.


Endocrine: Reports abnormal blood sugars.  No weight change.  No excessive 

sweating or thirst.  No cold intolerance.  





SOCIAL HISTORY


Patient was a smoker and quit 7 years ago.  She denies any alcohol or illicit 

drug use.





FAMILY HISTORY


Father  in his 60s after his fifth myocardial infarction.  He  from bone

cancer.  Mother  at age 62 and cervical cancer with history of diabetes.  

Patient has a total of 4 sisters all have diabetes.  One is past with end-stage 

renal disease.  Patient has one brother living.  Patient has 2 brothers passed 1

from an overdose and one from pneumonia.





PHYSICAL EXAMINATION


Gen: This is a morbidly obese 55-year-old  female.  Patient is resting 

in a recliner and appears to be comfortable.


HEENT: Head is atraumatic, normocephalic. Pupils equal, round. Sclerae is 

anicteric. 


NECK: Supple. No JVD. No lymphadenopathy. No thyromegaly. 


LUNGS: Clear to auscultation. No wheezes or rhonchi.  No intercostal 

retractions.


HEART: Regular rate and rhythm. No murmur. 


ABDOMEN: Morbidly obese.  Soft. Bowel sounds are present. No masses.  No 

tenderness.  Puncture sites show no signs of infection.


EXTREMITIES: No pedal edema.  No calf tenderness.


NEUROLOGICAL: Patient is awake, alert and oriented x3. Cranial nerves 2 through 

12 are grossly intact. 





ASSESSMENT AND PLAN


1.  Hyperosmolar hyperglycemic state in a patient with diabetes mellitus type 2.

 Insulin drip will be discontinued.  Patient will be resumed on Lantus 30 units 

daily and NovoLog scale before meals and at bedtime, consistent carb diet has 

been changed to bariatric diet.  Hold Invokana, hold metformin





2.  Dizziness most likely secondary to orthostatic hypotension.  Repeat 

orthostatics today.  Continue IV fluids changed to 0.9 normal saline at 100 mL 

per hour.





3.  Diabetes mellitus type 2.  Continue as in #1.





4.  Diabetic neuropathy.  Continue gabapentin 800 mg 3 times daily.





5.  Acute urinary tract infection.  Continue Rocephin 1 g IV piggyback daily, 

monitor urinary culture.





6.  Morbid obesity status post gastric sleeve on .  Patient to be

on a bariatric diet.





7.  Hypertension.  Continue Norvasc 10 mg daily.





8.  Hyperlipidemia.  Continue Lipitor 20 g daily.





9.  Chronic gout.  Continue allopurinol 100 mg daily.





10.  Gastroesophageal reflux disease.  Continue Protonix 40 mg daily.





11.  Recurrent depression.  Continue Paxil 30 mg daily.





12.  DVT prophylaxis.  Lovenox subcu.





Patient will be admitted to the hospital for a minimum of 2 night stay.





DISCHARGE PLAN


[ ].





Impression and plan of care have been directed as dictated by the signing 

physician.  Katlyn Gorman nurse practitioner acting as scribe for signing 

physician.





Past Medical History


Past Medical History: Diabetes Mellitus, Eye Disorder, Hyperlipidemia, 

Hypertension


Additional Past Medical History / Comment(s): 2/25/15  Pt presented to [facility] ER 

with substernal chest pain that started 24 hhours before coming to ER.  She 

noticed the chest pain when she woke up yesterday.  It is a heaviness and is 

intermittent with variable duration.  She also states she had alittle nausea 

with it.   Other HX:  Pt had recent (1/21/15)cataract surgery with lens implants

bilaterally at [facility]-post op she had low O2 saturations and was told

she had a very narrow airway-instead of going home same day, she was in the 

hospital for a couple days until her saturations improved.  She was discharged 

with home O2 which she wears at 2L/NC at [facility] and was to follow up today for 

testing for sleep apnea.  Pt states she also has diarrhea fairly frequently. 

Also has hx of 3 ruptured cervical discs with surgery and bilateral retina 

repair.  2018 - intermittently in hospital for 3 months for fluid on lungs, had 

fluid removed


History of Any Multi-Drug Resistant Organisms: MRSA


Date of last positivie culture/infection: 


MDRO Source:: legs and breasts.


Past Surgical History: Bariatric Surgery, Cholecystectomy, Hysterectomy, 

Orthopedic Surgery


Additional Past Surgical History / Comment(s): Bilateral cataract sx with lens 

implants, bilateral retinal repair. Cervical rodding for 3 ruptured discs.  

sleeve gastrectomy         3-1-21


Past Anesthesia/Blood Transfusion Reactions: Postoperative Nausea & Vomiting 

(PONV)


Additional Past Anesthesia/Blood Transfusion Reaction / Comment(s): Pt had 

cataract and lens implant at [facility] on 1/21/15 and afterwards she 

desaturated and was hospitalized.  She was told she has a very narrow airway.  

Pt has never recieved blood.


Past Psychological History: No Psychological Hx Reported


Additional Psychological History / Comment(s): Pt has severe clausterphobia 

which is getting worse as she ages. Pt recently placed on home O2 at 2L/NC which

she wears at nite.  She was to have a sleep apnea workup and was to have her 1st

appt for that today.  She lives at home with her .  She is independent.  

She drives school bus.  She uses no assistive devices.


Smoking Status: Former smoker


Past Alcohol Use History: None Reported


Additional Past Alcohol Use History / Comment(s): quit smoking , smoked 1 

ppd, started smoking age 19.


Past Drug Use History: None Reported





- Past Family History


  ** Father


Family Medical History: Cancer, Myocardial Infarction (MI)


Additional Family Medical History / Comment(s): Father had 5 MI's and  of 

bone cancer.





  ** Mother


Family Medical History: Cancer


Additional Family Medical History / Comment(s): cervical cancer





Medications and Allergies


                                Home Medications











 Medication  Instructions  Recorded  Confirmed  Type


 


Montelukast [Singulair] 10 mg PO DAILY 18 History


 


Pantoprazole Sodium [Protonix] 40 mg PO DAILY 18 History


 


amLODIPine [Norvasc] 10 mg PO DAILY 18 History


 


Mirabegron [Myrbetriq] 50 mg PO DAILY 20 History


 


PARoxetine HCL 30 mg PO DAILY 20 History


 


metFORMIN HCL [metFORMIN HCL ER] 1,000 mg PO AC-BID 20 History


 


Allopurinol [Zyloprim] 100 mg PO DAILY 21 History


 


Gabapentin [Neurontin] 800 mg PO TID 21 History


 


Insulin Glargine,Hum.rec.anlog 50 unit SQ DAILY 21 History





[Lantus Solostar]    


 


Omeprazole [PriLOSEC] 40 mg PO DAILY #30 capsule. 21 Rx


 


Aspirin EC [Ecotrin Low Dose] 81 mg PO DAILY 21 History


 


Atorvastatin [Lipitor] 20 mg PO DAILY 21 History


 


Canagliflozin [Invokana] 300 mg PO DAILY 21 History


 


Cetirizine HCl [Zyrtec] 10 mg PO DAILY 21 History


 


Ergocalciferol (Vitamin D2) 1,250 mcg PO Q7D 21 History





[Drisdol (50,000 Iu)]    


 


Ibuprofen [Motrin] 800 mg PO Q6H PRN 21 History


 


Nystatin 100,000Unit/gm Cream 1 applic TOPICAL BID 21 History





[Mycostatin Cream]    








                                    Allergies











Allergy/AdvReac Type Severity Reaction Status Date / Time


 


Sulfa (Sulfonamide Allergy  Anaphylaxis Verified 21 18:47





Antibiotics)     














Physical Exam


Vitals: 


                                   Vital Signs











  Temp Pulse Pulse Pulse Pulse Resp BP


 


 21 03:43  97.8 F   71    18 


 


 21 01:56    71  99  78  18 


 


 21 00:00  97.8 F   71    18 


 


 21 20:30  98.0 F  83     18  156/85


 


 21 20:00  97.8 F   105 H    18 


 


 21 18:49   84     18  125/67


 


 21 16:27    82  99  78  


 


 21 14:39  98.0 F  88     18  128/69














  BP BP BP Pulse Ox


 


 21 03:43  109/69    94 L


 


 21 01:56    


 


 21 00:00  89/54    95


 


 21 20:30     97


 


 21 20:00  129/74    93 L


 


 21 18:49     100


 


 21 16:27  109/51  107/58  127/60 


 


 21 14:39     100








                                Intake and Output











 21





 22:59 06:59 14:59


 


Intake Total 21.690 16.072 0.95


 


Output Total  0 


 


Balance 21.690 16.072 0.95


 


Intake:   


 


  Intake, IV Titration 21.690 16.072 0.95





  Amount   


 


    Insulin Regular 100 unit 21.690 16.072 0.95





    In Sodium Chloride 0.9%   





    100 ml @ 0.1 UNITS/KG/HR   





    13.148 mls/hr IV .Q7H41M   





    [facility] Rx#:[id number]   


 


Output:   


 


  Urine  0 


 


Other:   


 


  Voiding Method  Toilet 


 


  # Voids  0 


 


  Weight 130.181 kg 133.1 kg 














Results


CBC & Chem 7: 


                                 21 09:48





                                 21 09:48


Labs: 


                  Abnormal Lab Results - Last 24 Hours (Table)











  21 Range/Units





  16:11 16:25 16:25 


 


WBC   13.5 H   (3.8-10.6)  k/uL


 


RDW   18.5 H   (11.5-15.5)  %


 


Neutrophils #   10.2 H   (1.3-7.7)  k/uL


 


Sodium     (137-145)  mmol/L


 


Chloride     ()  mmol/L


 


Carbon Dioxide    14 L  (22-30)  mmol/L


 


BUN    18 H  (7-17)  mg/dL


 


Glucose    237 H  (74-99)  mg/dL


 


POC Glucose (mg/dL)     (75-99)  mg/dL


 


Urine Appearance  Cloudy H    (Clear)  


 


Urine Protein  1+ H    (Negative)  


 


Urine Glucose (UA)  4+ H    (Negative)  


 


Urine Ketones  4+ H    (Negative)  


 


Urine Blood  Moderate H    (Negative)  


 


Ur Leukocyte Esterase  Large H    (Negative)  


 


Urine RBC  55 H    (0-5)  /hpf


 


Urine WBC  >182 H    (0-5)  /hpf


 


Urine WBC Clumps  Many H    (None)  /hpf


 


Hyaline Casts  3 H    (0-2)  /lpf


 


Urine Yeast (Budding)  Many H    (None)  /hpf














  21 Range/Units





  17:56 19:06 20:05 


 


WBC     (3.8-10.6)  k/uL


 


RDW     (11.5-15.5)  %


 


Neutrophils #     (1.3-7.7)  k/uL


 


Sodium     (137-145)  mmol/L


 


Chloride     ()  mmol/L


 


Carbon Dioxide     (22-30)  mmol/L


 


BUN     (7-17)  mg/dL


 


Glucose     (74-99)  mg/dL


 


POC Glucose (mg/dL)  203 H  134 H  105 H  (75-99)  mg/dL


 


Urine Appearance     (Clear)  


 


Urine Protein     (Negative)  


 


Urine Glucose (UA)     (Negative)  


 


Urine Ketones     (Negative)  


 


Urine Blood     (Negative)  


 


Ur Leukocyte Esterase     (Negative)  


 


Urine RBC     (0-5)  /hpf


 


Urine WBC     (0-5)  /hpf


 


Urine WBC Clumps     (None)  /hpf


 


Hyaline Casts     (0-2)  /lpf


 


Urine Yeast (Budding)     (None)  /hpf














  21 Range/Units





  20:27 23:10 00:15 


 


WBC     (3.8-10.6)  k/uL


 


RDW     (11.5-15.5)  %


 


Neutrophils #     (1.3-7.7)  k/uL


 


Sodium  136 L   136 L  (137-145)  mmol/L


 


Chloride  108 H    ()  mmol/L


 


Carbon Dioxide  11 L   15 L  (22-30)  mmol/L


 


BUN     (7-17)  mg/dL


 


Glucose  100 H   126 H  (74-99)  mg/dL


 


POC Glucose (mg/dL)   113 H   (75-99)  mg/dL


 


Urine Appearance     (Clear)  


 


Urine Protein     (Negative)  


 


Urine Glucose (UA)     (Negative)  


 


Urine Ketones     (Negative)  


 


Urine Blood     (Negative)  


 


Ur Leukocyte Esterase     (Negative)  


 


Urine RBC     (0-5)  /hpf


 


Urine WBC     (0-5)  /hpf


 


Urine WBC Clumps     (None)  /hpf


 


Hyaline Casts     (0-2)  /lpf


 


Urine Yeast (Budding)     (None)  /hpf














  21 Range/Units





  01:18 03:16 05:07 


 


WBC     (3.8-10.6)  k/uL


 


RDW     (11.5-15.5)  %


 


Neutrophils #     (1.3-7.7)  k/uL


 


Sodium     (137-145)  mmol/L


 


Chloride     ()  mmol/L


 


Carbon Dioxide     (22-30)  mmol/L


 


BUN     (7-17)  mg/dL


 


Glucose     (74-99)  mg/dL


 


POC Glucose (mg/dL)  121 H  140 H  190 H  (75-99)  mg/dL


 


Urine Appearance     (Clear)  


 


Urine Protein     (Negative)  


 


Urine Glucose (UA)     (Negative)  


 


Urine Ketones     (Negative)  


 


Urine Blood     (Negative)  


 


Ur Leukocyte Esterase     (Negative)  


 


Urine RBC     (0-5)  /hpf


 


Urine WBC     (0-5)  /hpf


 


Urine WBC Clumps     (None)  /hpf


 


Hyaline Casts     (0-2)  /lpf


 


Urine Yeast (Budding)     (None)  /hpf














  21 Range/Units





  07:15 


 


WBC   (3.8-10.6)  k/uL


 


RDW   (11.5-15.5)  %


 


Neutrophils #   (1.3-7.7)  k/uL


 


Sodium   (137-145)  mmol/L


 


Chloride   ()  mmol/L


 


Carbon Dioxide   (22-30)  mmol/L


 


BUN   (7-17)  mg/dL


 


Glucose   (74-99)  mg/dL


 


POC Glucose (mg/dL)  179 H  (75-99)  mg/dL


 


Urine Appearance   (Clear)  


 


Urine Protein   (Negative)  


 


Urine Glucose (UA)   (Negative)  


 


Urine Ketones   (Negative)  


 


Urine Blood   (Negative)  


 


Ur Leukocyte Esterase   (Negative)  


 


Urine RBC   (0-5)  /hpf


 


Urine WBC   (0-5)  /hpf


 


Urine WBC Clumps   (None)  /hpf


 


Hyaline Casts   (0-2)  /lpf


 


Urine Yeast (Budding)   (None)  /hpf








                      Microbiology - Last 24 Hours (Table)











 21 16:11 Urine Culture - Preliminary





 Urine,Clean Catch 














Thrombosis Risk Factor Assmnt





- Choose All That Apply


Each Factor Represents 1 point: Age 41-60 years


Thrombosis Risk Factor Assessment Total Risk Factor Score: 1


Thrombosis Risk Factor Assessment Level: Low Risk

## 2021-03-10 VITALS — RESPIRATION RATE: 16 BRPM

## 2021-03-10 LAB
ANION GAP SERPL CALC-SCNC: 13 MMOL/L
BUN SERPL-SCNC: 12 MG/DL (ref 7–17)
CALCIUM SPEC-MCNC: 8.8 MG/DL (ref 8.4–10.2)
CHLORIDE SERPL-SCNC: 108 MMOL/L (ref 98–107)
CO2 SERPL-SCNC: 18 MMOL/L (ref 22–30)
ERYTHROCYTE [DISTWIDTH] IN BLOOD BY AUTOMATED COUNT: 4.86 M/UL (ref 3.8–5.4)
ERYTHROCYTE [DISTWIDTH] IN BLOOD: 18.7 % (ref 11.5–15.5)
GLUCOSE BLD-MCNC: 125 MG/DL (ref 75–99)
GLUCOSE BLD-MCNC: 145 MG/DL (ref 75–99)
GLUCOSE BLD-MCNC: 177 MG/DL (ref 75–99)
GLUCOSE BLD-MCNC: 183 MG/DL (ref 75–99)
GLUCOSE BLD-MCNC: 190 MG/DL (ref 75–99)
GLUCOSE SERPL-MCNC: 138 MG/DL (ref 74–99)
HCT VFR BLD AUTO: 40.6 % (ref 34–46)
HGB BLD-MCNC: 13.7 GM/DL (ref 11.4–16)
MCH RBC QN AUTO: 28.1 PG (ref 25–35)
MCHC RBC AUTO-ENTMCNC: 33.7 G/DL (ref 31–37)
MCV RBC AUTO: 83.4 FL (ref 80–100)
PLATELET # BLD AUTO: 205 K/UL (ref 150–450)
POTASSIUM SERPL-SCNC: 3.6 MMOL/L (ref 3.5–5.1)
SODIUM SERPL-SCNC: 139 MMOL/L (ref 137–145)
WBC # BLD AUTO: 8.7 K/UL (ref 3.8–10.6)

## 2021-03-10 RX ADMIN — MONTELUKAST SODIUM SCH MG: 10 TABLET, FILM COATED ORAL at 20:40

## 2021-03-10 RX ADMIN — PANTOPRAZOLE SODIUM SCH MG: 40 TABLET, DELAYED RELEASE ORAL at 07:46

## 2021-03-10 RX ADMIN — NYSTATIN SCH: 100000 CREAM TOPICAL at 22:00

## 2021-03-10 RX ADMIN — ASPIRIN SCH: 325 TABLET ORAL at 07:47

## 2021-03-10 RX ADMIN — CEFAZOLIN SCH MLS/HR: 330 INJECTION, POWDER, FOR SOLUTION INTRAMUSCULAR; INTRAVENOUS at 12:16

## 2021-03-10 RX ADMIN — INSULIN ASPART SCH: 100 INJECTION, SOLUTION INTRAVENOUS; SUBCUTANEOUS at 06:40

## 2021-03-10 RX ADMIN — INSULIN ASPART SCH UNIT: 100 INJECTION, SOLUTION INTRAVENOUS; SUBCUTANEOUS at 12:16

## 2021-03-10 RX ADMIN — ASPIRIN 81 MG CHEWABLE TABLET SCH MG: 81 TABLET CHEWABLE at 07:46

## 2021-03-10 RX ADMIN — INSULIN ASPART SCH UNIT: 100 INJECTION, SOLUTION INTRAVENOUS; SUBCUTANEOUS at 20:40

## 2021-03-10 RX ADMIN — INSULIN DETEMIR SCH UNIT: 100 INJECTION, SOLUTION SUBCUTANEOUS at 07:45

## 2021-03-10 RX ADMIN — ATORVASTATIN CALCIUM SCH MG: 20 TABLET, FILM COATED ORAL at 07:47

## 2021-03-10 RX ADMIN — ENOXAPARIN SODIUM SCH MG: 40 INJECTION SUBCUTANEOUS at 07:44

## 2021-03-10 RX ADMIN — GABAPENTIN SCH MG: 400 CAPSULE ORAL at 22:36

## 2021-03-10 RX ADMIN — CEFAZOLIN SCH MLS/HR: 330 INJECTION, POWDER, FOR SOLUTION INTRAMUSCULAR; INTRAVENOUS at 07:54

## 2021-03-10 RX ADMIN — INSULIN ASPART SCH UNIT: 100 INJECTION, SOLUTION INTRAVENOUS; SUBCUTANEOUS at 17:01

## 2021-03-10 RX ADMIN — GABAPENTIN SCH MG: 400 CAPSULE ORAL at 07:47

## 2021-03-10 RX ADMIN — CEFAZOLIN SCH: 330 INJECTION, POWDER, FOR SOLUTION INTRAMUSCULAR; INTRAVENOUS at 07:45

## 2021-03-10 RX ADMIN — NYSTATIN SCH: 100000 CREAM TOPICAL at 07:47

## 2021-03-10 RX ADMIN — LORATADINE SCH MG: 10 TABLET ORAL at 07:47

## 2021-03-10 RX ADMIN — GABAPENTIN SCH MG: 400 CAPSULE ORAL at 16:04

## 2021-03-10 NOTE — P.PN
Subjective


Progress Note Date: 03/10/21





HISTORY OF PRESENT ILLNESS


This is a 55-year-old  female patient of Dr. Higginbotham and Dr. ZAHRA Mcneil 

with past medical history of hypertension, hyperlipidemia, diabetes mellitus 

type 2, diabetic neuropathy, chronic gout, recurrent depression, 

gastroesophageal reflux disease, morbid obesity with BMI of 47.  On March 1, 

patient underwent robotic-assisted da Fidencio laparoscopic sleeve gastrectomy with

Dr. Dimas.  Patient presented to the ER on March 7 with complaints of diz

ziness and lightheadedness especially when changing positions.  Patient was 

discharged home.  She returned to the emergency center on March 8 again with 

dizziness lightheadedness.  She has had decreased oral intake due to recent 

gastric surgery.  She does complain of abdominal pain.  No fever or chills.  No 

diarrhea.  WBC was 13.5, hemoglobin 14.4 platelet count 239.  Sodium 138, 

potassium 4.2, chloride 102, CO2 14, BUN 18 and creatinine 0.84.  Blood sugar 

237.  Troponin was negative.  Urinalysis was blood moderate, nitrate negative 

leukoesterase large, RBCs 55, WBCs greater than 182.  Patient was started on IV 

antibiotics and admitted to the [facility] floor on insulin drip and protocol.  

This morning, patient states that she still has dizziness about the same as 

yesterday.  Her blood sugars are running 179-221.  Orthostatics were positive 

yesterday and will be rechecked.





3/10: The patient had orthostatic changes yesterday.  Repeat orthostatics done 

today revealed systolic of 127 flat and 78 standing.  Patient ordered for 1 L of

IV fluids.  Cortisol level ordered for the morning.   She has been afebrile, 

heart rate 75, blood pressure 143/66, pulse ox 100% on room air.  Blood sugars 

are improved running between 125 and 190.  CBC is unremarkable.  CO2 18.  Urine 

culture finalized with skin sadiq.  Patient was anticipating discharge home 

today but we will plan to monitor overnight and check cortisol level and repeat 

orthostatics.  Possible discharge by tomorrow. 





REVIEW OF SYSTEMS


Constitutional: No fever, no chills, no night sweats.  No weight change.  No 

weakness, fatigue or lethargy.  No daytime sleepiness.


EENT: No headache.  No blurred vision or double vision, no loss of vision.  No 

loss of Hearing, no ringing in the ears, no dizziness.  No nasal drainage or 

congestion.  No epistaxis.  No sore throat.


Lungs: No shortness of breath, cough, no sputum production.  No wheezing.


Cardiovascular: No chest pain, no lower extremity edema.  No palpitations.  No 

paroxysmal nocturnal dyspnea.  No orthopnea.  Reports lightheadedness and 

reports dizziness.  No syncopal episodes.


Abdominal: No abdominal pain.  No nausea, vomiting.  No diarrhea.  No c

onstipation.  No bloody or tarry stools..  No loss of appetite.


Genitourinary: No dysuria, increased frequency, urgency.  No urinary retention.


Musculoskeletal: No myalgias.  No muscle weakness, no gait dysfunction, no 

frequent falls.  No back pain.  No neck pain.


Integumentary: No wounds, no lesions.  No rash or pruritus.  No unusual 

bruising.  No change in hair or nails.


Neurologic: No aphasia. No facial droop. No change in mentation. No head injury.

No headache. No paralysis. No paresthesia.


Psychiatric: No depression.  No anxiety.  No mood swings.


Endocrine: Reports abnormal blood sugars.  No weight change.  No excessive 

sweating or thirst.  No cold intolerance.  





PHYSICAL EXAMINATION


Gen: This is a morbidly obese 55-year-old  female.  Patient is resting 

in a recliner and appears to be comfortable.


HEENT: Head is atraumatic, normocephalic. Pupils equal, round. Sclerae is 

anicteric. 


NECK: Supple. No JVD. No lymphadenopathy. No thyromegaly. 


LUNGS: Clear to auscultation. No wheezes or rhonchi.  No intercostal 

retractions.


HEART: Regular rate and rhythm. No murmur. 


ABDOMEN: Morbidly obese.  Soft. Bowel sounds are present. No masses.  No tendern

ess.  Puncture sites show no signs of infection.


EXTREMITIES: No pedal edema.  No calf tenderness.


NEUROLOGICAL: Patient is awake, alert and oriented x3. Cranial nerves 2 through 

12 are grossly intact. 





ASSESSMENT AND PLAN


1.  Hyperosmolar hyperglycemic state in a patient with diabetes mellitus type 2.

 Continue Lantus 30 units daily and NovoLog scale before meals and at bedtime, 

consistent carb diet has been changed to bariatric diet.  Hold Invokana, hold 

metformin





2.  Dizziness most likely secondary to orthostatic hypotension.  Repeat 

orthostatics in the morning.  1 L of IV fluids.





3.  Diabetes mellitus type 2.  Continue as in #1.





4.  Diabetic neuropathy.  Continue gabapentin 800 mg 3 times daily.





5.  Acute urinary tract infection.  Continue Rocephin 1 g IV piggyback daily, 

monitor urinary culture.





6.  Morbid obesity status post gastric sleeve on March 1, stable.  Patient to be

on a bariatric diet.





7.  Hypertension.  Continue Norvasc 10 mg daily.





8.  Hyperlipidemia.  Continue Lipitor 20 g daily.





9.  Chronic gout.  Continue allopurinol 100 mg daily.





10.  Gastroesophageal reflux disease.  Continue Protonix 40 mg daily.





11.  Recurrent depression.  Continue Paxil 30 mg daily.





12.  DVT prophylaxis.  Lovenox subcu.





Patient will be admitted to the hospital for a minimum of 2 night stay.





DISCHARGE PLAN


Home on Thursday.





Impression and plan of care have been directed as dictated by the signing 

physician.  Katlyn Gorman nurse practitioner acting as scribe for signing 

physician.








Objective





- Vital Signs


Vital signs: 


                                   Vital Signs











Temp  97.4 F L  03/10/21 07:56


 


Pulse  75   03/10/21 07:56


 


Resp  20   03/10/21 07:56


 


BP  143/66   03/10/21 07:56


 


Pulse Ox  100   03/10/21 07:56








                                 Intake & Output











 03/09/21 03/10/21 03/10/21





 18:59 06:59 18:59


 


Intake Total 1760.95  480


 


Balance 1760.95  480


 


Weight 133.1 kg 133 kg 


 


Intake:   


 


  Intake, IV Titration 0.95  





  Amount   


 


    Insulin Regular 100 unit 0.95  





    In Sodium Chloride 0.9%   





    100 ml @ 0.1 UNITS/KG/HR   





    13.148 mls/hr IV .Q7H41M   





    [facility] Rx#:[id number]   


 


  Oral 1760  480


 


Other:   


 


  Voiding Method  Toilet 


 


  # Voids  1 














- Labs


CBC & Chem 7: 


                                 03/10/21 07:20





                                 03/10/21 07:20


Labs: 


                  Abnormal Lab Results - Last 24 Hours (Table)











  03/09/21 03/09/21 03/09/21 Range/Units





  09:48 09:48 10:14 


 


RDW  18.6 H    (11.5-15.5)  %


 


Chloride     ()  mmol/L


 


Carbon Dioxide   15 L   (22-30)  mmol/L


 


Glucose   221 H   (74-99)  mg/dL


 


POC Glucose (mg/dL)    198 H  (75-99)  mg/dL














  03/09/21 03/09/21 03/09/21 Range/Units





  11:54 12:52 17:01 


 


RDW     (11.5-15.5)  %


 


Chloride     ()  mmol/L


 


Carbon Dioxide     (22-30)  mmol/L


 


Glucose     (74-99)  mg/dL


 


POC Glucose (mg/dL)  193 H  208 H  186 H  (75-99)  mg/dL














  03/09/21 03/10/21 03/10/21 Range/Units





  20:24 06:11 07:20 


 


RDW    18.7 H  (11.5-15.5)  %


 


Chloride     ()  mmol/L


 


Carbon Dioxide     (22-30)  mmol/L


 


Glucose     (74-99)  mg/dL


 


POC Glucose (mg/dL)  204 H  125 H   (75-99)  mg/dL














  03/10/21 03/10/21 Range/Units





  07:20 07:40 


 


RDW    (11.5-15.5)  %


 


Chloride  108 H   ()  mmol/L


 


Carbon Dioxide  18 L   (22-30)  mmol/L


 


Glucose  138 H   (74-99)  mg/dL


 


POC Glucose (mg/dL)   145 H  (75-99)  mg/dL








                      Microbiology - Last 24 Hours (Table)











 03/08/21 16:11 Urine Culture - Final





 Urine,Clean Catch

## 2021-03-11 ENCOUNTER — HOSPITAL ENCOUNTER (EMERGENCY)
Dept: HOSPITAL 47 - EC | Age: 56
Discharge: HOME | End: 2021-03-11
Payer: MEDICARE

## 2021-03-11 VITALS — SYSTOLIC BLOOD PRESSURE: 138 MMHG | HEART RATE: 85 BPM | TEMPERATURE: 98.6 F | DIASTOLIC BLOOD PRESSURE: 87 MMHG

## 2021-03-11 VITALS — SYSTOLIC BLOOD PRESSURE: 123 MMHG | TEMPERATURE: 99.2 F | HEART RATE: 83 BPM | DIASTOLIC BLOOD PRESSURE: 66 MMHG

## 2021-03-11 VITALS — RESPIRATION RATE: 20 BRPM

## 2021-03-11 DIAGNOSIS — Z79.1: ICD-10-CM

## 2021-03-11 DIAGNOSIS — U07.1: Primary | ICD-10-CM

## 2021-03-11 DIAGNOSIS — E11.36: ICD-10-CM

## 2021-03-11 DIAGNOSIS — E78.5: ICD-10-CM

## 2021-03-11 DIAGNOSIS — Z87.891: ICD-10-CM

## 2021-03-11 DIAGNOSIS — I10: ICD-10-CM

## 2021-03-11 LAB
GLUCOSE BLD-MCNC: 110 MG/DL (ref 75–99)
GLUCOSE BLD-MCNC: 162 MG/DL (ref 75–99)

## 2021-03-11 PROCEDURE — 87635 SARS-COV-2 COVID-19 AMP PRB: CPT

## 2021-03-11 PROCEDURE — 99283 EMERGENCY DEPT VISIT LOW MDM: CPT

## 2021-03-11 RX ADMIN — ASPIRIN SCH: 325 TABLET ORAL at 09:02

## 2021-03-11 RX ADMIN — ASPIRIN 81 MG CHEWABLE TABLET SCH MG: 81 TABLET CHEWABLE at 09:01

## 2021-03-11 RX ADMIN — CEFAZOLIN SCH: 330 INJECTION, POWDER, FOR SOLUTION INTRAMUSCULAR; INTRAVENOUS at 06:30

## 2021-03-11 RX ADMIN — NYSTATIN SCH APPLIC: 100000 CREAM TOPICAL at 09:02

## 2021-03-11 RX ADMIN — GABAPENTIN SCH MG: 400 CAPSULE ORAL at 09:01

## 2021-03-11 RX ADMIN — LORATADINE SCH MG: 10 TABLET ORAL at 09:01

## 2021-03-11 RX ADMIN — INSULIN DETEMIR SCH UNIT: 100 INJECTION, SOLUTION SUBCUTANEOUS at 09:02

## 2021-03-11 RX ADMIN — ATORVASTATIN CALCIUM SCH MG: 20 TABLET, FILM COATED ORAL at 09:01

## 2021-03-11 RX ADMIN — PANTOPRAZOLE SODIUM SCH MG: 40 TABLET, DELAYED RELEASE ORAL at 09:01

## 2021-03-11 RX ADMIN — ENOXAPARIN SODIUM SCH MG: 40 INJECTION SUBCUTANEOUS at 09:02

## 2021-03-11 RX ADMIN — INSULIN ASPART SCH: 100 INJECTION, SOLUTION INTRAVENOUS; SUBCUTANEOUS at 08:52

## 2021-03-11 NOTE — ED
URI HPI





- General


Chief Complaint: Upper Respiratory Infection


Stated Complaint: chest congestion/cough


Time Seen by Provider: 21 22:12


Source: patient


Mode of arrival: ambulatory


Limitations: no limitations





- History of Present Illness


Initial Comments: 





Patient is a 55-year-old female presenting to the emergency Department with 

complaints of cold exposure.  Patient states she was discharged today from the 

hospital for dehydration and found out her daughter had tested positive for 

Covid.  Patient states she started having a headache, nausea and a mild cough 

that started today.  Patient would like a Covid test.  She denies any fever or 

chills, no shortness of breath, no chest pain.  She has no further complaints at

this time.  Upon arrival to the ER, her vital signs are stable.





- Related Data


                                Home Medications











 Medication  Instructions  Recorded  Confirmed


 


Montelukast [Singulair] 10 mg PO DAILY 18


 


Pantoprazole Sodium [Protonix] 40 mg PO DAILY 18


 


amLODIPine [Norvasc] 10 mg PO DAILY 18


 


Mirabegron [Myrbetriq] 50 mg PO DAILY 20


 


PARoxetine HCL 30 mg PO DAILY 20


 


metFORMIN HCL [metFORMIN HCL ER] 1,000 mg PO AC-BID 20


 


Allopurinol [Zyloprim] 100 mg PO DAILY 21


 


Gabapentin [Neurontin] 800 mg PO TID 21


 


Insulin Glargine,Hum.rec.anlog 50 unit SQ DAILY 21





[Lantus Solostar]   


 


Aspirin EC [Ecotrin Low Dose] 81 mg PO DAILY 21


 


Atorvastatin [Lipitor] 20 mg PO DAILY 21


 


Canagliflozin [Invokana] 300 mg PO DAILY 21


 


Cetirizine HCl [Zyrtec] 10 mg PO DAILY 21


 


Ergocalciferol (Vitamin D2) 1,250 mcg PO Q7D 21





[Drisdol (50,000 Iu)]   


 


Ibuprofen [Motrin] 800 mg PO Q6H PRN 21


 


Nystatin 100,000Unit/gm Cream 1 applic TOPICAL BID 21





[Mycostatin Cream]   








                                  Previous Rx's











 Medication  Instructions  Recorded


 


Omeprazole [PriLOSEC] 40 mg PO DAILY #30 capsule. 21











                                    Allergies











Allergy/AdvReac Type Severity Reaction Status Date / Time


 


Sulfa (Sulfonamide Allergy  Anaphylaxis Verified 21 21:41





Antibiotics)     














Review of Systems


ROS Statement: 


Those systems with pertinent positive or pertinent negative responses have been 

documented in the HPI.





ROS Other: All systems not noted in ROS Statement are negative.





Past Medical History


Past Medical History: Diabetes Mellitus, Eye Disorder, Hyperlipidemia, 

Hypertension


Additional Past Medical History / Comment(s): 2/25/15  Pt presented to [facility] ER 

with substernal chest pain that started 24 hhours before coming to ER.  She 

noticed the chest pain when she woke up yesterday.  It is a heaviness and is 

intermittent with variable duration.  She also states she had alittle nausea 

with it.   Other HX:  Pt had recent (1/21/15)cataract surgery with lens implants

 bilaterally at [facility]-post op she had low O2 saturations and was 

told she had a very narrow airway-instead of going home same day, she was in the

 hospital for a couple days until her saturations improved.  She was discharged 

with home O2 which she wears at 2L/NC at nite and was to follow up today for 

testing for sleep apnea.  Pt states she also has diarrhea fairly frequently. 

Also has hx of 3 ruptured cervical discs with surgery and bilateral retina 

repair.  2018 - intermittently in hospital for 3 months for fluid on lungs, had 

fluid removed


History of Any Multi-Drug Resistant Organisms: MRSA


Date of last positivie culture/infection: 


MDRO Source:: legs and breasts.


Past Surgical History: Bariatric Surgery, Cholecystectomy, Hysterectomy, 

Orthopedic Surgery


Additional Past Surgical History / Comment(s): Bilateral cataract sx with lens 

implants, bilateral retinal repair. Cervical rodding for 3 ruptured discs.  

sleeve gastrectomy         3-1-21


Past Anesthesia/Blood Transfusion Reactions: Postoperative Nausea & Vomiting 

(PONV)


Additional Past Anesthesia/Blood Transfusion Reaction / Comment(s): Pt had 

cataract and lens implant at [facility] on 1/21/15 and afterwards she 

desaturated and was hospitalized.  She was told she has a very narrow airway.  

Pt has never recieved blood.


Past Psychological History: No Psychological Hx Reported


Smoking Status: Former smoker


Past Alcohol Use History: None Reported


Past Drug Use History: None Reported





- Past Family History


  ** Father


Family Medical History: Cancer, Myocardial Infarction (MI)


Additional Family Medical History / Comment(s): Father had 5 MI's and  of 

bone cancer.





  ** Mother


Family Medical History: Cancer


Additional Family Medical History / Comment(s): cervical cancer





General Exam





- General Exam Comments


Initial Comments: 





GENERAL: 


Patient is well-developed and well-nourished.  Patient is nontoxic and in no 

acute distress.





HEAD: 


Atraumatic, normocephalic.





EYES:


Pupils equal round and reactive to light, extraocular movements intact, sclera 

anicteric, conjunctiva are normal.  Eyelids were unremarkable.





ENT: 


TMs normal, nares patent, oropharynx clear without exudates.  Moist mucous 

membranes.





NECK: 


Normal range of motion, supple without lymphadenopathy or JVD.





LUNGS:


Unlabored respirations.  Breath sounds clear to auscultation bilaterally and 

equal.  No wheezes rales or rhonchi.





HEART:


Regular rate and rhythm without murmurs, rubs or gallops.





ABDOMEN: 


Soft, nontender, normoactive bowel sounds.  No guarding, no rebound.  No masses 

appreciated.





: Deferred 





MUSCULOSKELETAL: 


Normal extremities with adequate strength and normal range of motion, no pitting

 or edema.  No clubbing or cyanosis.





NEUROLOGICAL: 


Patient is alert and oriented x 3.  Motor and sensory are also intact.  Cranial 

nerves II through XII grossly intact.  Symmetrical smile.  Normal speech, normal

 gait.   





PSYCH:


Normal mood, normal affect.





SKIN:


 Warm, Dry, normal turgor, no rashes or lesions noted.


Limitations: no limitations





Course


                                   Vital Signs











  21





  21:37 22:29


 


Temperature 99.2 F 


 


Pulse Rate 83 


 


Respiratory 17 20





Rate  


 


Blood Pressure 123/66 


 


O2 Sat by Pulse 97 





Oximetry  














Medical Decision Making





- Medical Decision Making





Patient is a 55-year-old female here with concerns of recent Covid exposure.  

She has a mild cough, mild headache.  Patient's daughter recently tested 

positive.  Her vital signs are stable, her exam is unremarkable.  Patient's 

rapid Covid is positive.  Patient refused a chest x-ray today.  Patient will 

take Tylenol Motrin for her headache and increase her fluid intake.  She can 

follow-up with her doctor.  Return parameters were discussed with the patient 

and she verbalized understanding.  Case discussed with Cristo.  





- Lab Data


                                   Lab Results











  21 Range/Units





  22:15 


 


Coronavirus (PCR)  Detected A  (Not Detectd)  














Disposition


Clinical Impression: 


 Viral respiratory illness, COVID-19





Disposition: HOME SELF-CARE


Condition: Stable


Instructions (If sedation given, give patient instructions):  Coronavirus 

Disease 2019 (COVID-19)


Additional Instructions: 


Please return to the Emergency Department if symptoms worsen or any other 

concerns.


Covid test positive today.


Continue with Tylenol or Motrin for headache or symptom relief.  Increase fluid 

intake.  


Please follow-up with your regular doctor.


Is patient prescribed a controlled substance at d/c from ED?: No


Referrals: 


Rosas Higginbotham MD [Primary Care Provider] - 1-2 days

## 2021-03-11 NOTE — P.DS
Providers


Date of admission: 


03/08/21 17:22





Expected date of discharge: 03/11/21


Attending physician: 


Siva Rodgers MD





Primary care physician: 


Rosas Higginbotham





[facility] Course: 





HISTORY OF PRESENT ILLNESS


This is a 55-year-old  female patient of Dr. Higginbotham and Dr. ZAHRA Mcneil 

with past medical history of hypertension, hyperlipidemia, diabetes mellitus 

type 2, diabetic neuropathy, chronic gout, recurrent depression, 

gastroesophageal reflux disease, morbid obesity with BMI of 47.  On March 1, 

patient underwent robotic-assisted da Fidencio laparoscopic sleeve gastrectomy with

Dr. Dimas.  Patient presented to the ER on March 7 with complaints of 

dizziness and lightheadedness especially when changing positions.  Patient was 

discharged home.  She returned to the emergency center on March 8 again with 

dizziness lightheadedness.  She has had decreased oral intake due to recent 

gastric surgery.  She does complain of abdominal pain.  No fever or chills.  No 

diarrhea.  WBC was 13.5, hemoglobin 14.4 platelet count 239.  Sodium 138, 

potassium 4.2, chloride 102, CO2 14, BUN 18 and creatinine 0.84.  Blood sugar 

237.  Troponin was negative.  Urinalysis was blood moderate, nitrate negative 

leukoesterase large, RBCs 55, WBCs greater than 182.  Patient was started on IV 

antibiotics and [facility] on insulin drip and protocol.  

This morning, patient states that she still has dizziness about the same as 

yesterday.  Her blood sugars are running 179-221.  Orthostatics were positive 

yesterday and will be rechecked.





3/10: The patient had orthostatic changes yesterday.  Repeat orthostatics done 

today revealed systolic of 127 flat and 78 standing.  Patient ordered for 1 L of

IV fluids.  Cortisol level ordered for the morning.   She has been afebrile, 

heart rate 75, blood pressure 143/66, pulse ox 100% on room air.  Blood sugars 

are improved running between 125 and 190.  CBC is unremarkable.  CO2 18.  Urine 

culture finalized with skin sadiq.  Patient was anticipating discharge home to

day but we will plan to monitor overnight and check cortisol level and repeat 

orthostatics.  Possible discharge by tomorrow. 





3/11: Patient is seen today in follow-up.  She states she has no dizziness.  

Patient stood up and ambulated in the room and experienced no dizziness or 

lightheadedness.  Overall blood pressures are improved this morning at 138/87, 

pulse ox 100%, heart rate 85, afebrile.  Blood sugars are running between 110 in

the 183.  The patient will be discharged home today in stable condition.  No 

changes made to her home medication list.





ASSESSMENT AND PLAN


1.  Hyperosmolar hyperglycemic state in a patient with diabetes mellitus type 2.

 


2.  Dizziness most likely secondary to orthostatic hypotension status post IV 

fluids.  


3.  Diabetes mellitus type 2.  


4.  Diabetic neuropathy. 


5.  Acute urinary tract infection, completed antibiotics.  


6.  Morbid obesity status post gastric sleeve on March 1, stable. 


7.  Hypertension.  


8.  Hyperlipidemia.  


9.  Chronic gout.  


10.  Gastroesophageal reflux disease.  


11.  Recurrent depression. 





DISCHARGE PLAN


Home.





Impression and plan of care have been directed as dictated by the signing 

physician.  Katlyn Gorman nurse practitioner acting as scribe for signing 

physician.





Patient Condition at Discharge: Good





Plan - Discharge Summary


New Discharge Prescriptions: 


Continue


   Pantoprazole Sodium [Protonix] 40 mg PO DAILY


   amLODIPine [Norvasc] 10 mg PO DAILY


   Montelukast [Singulair] 10 mg PO DAILY


   metFORMIN HCL [metFORMIN HCL ER] 1,000 mg PO AC-BID


   PARoxetine HCL 30 mg PO DAILY


   Mirabegron [Myrbetriq] 50 mg PO DAILY


   Allopurinol [Zyloprim] 100 mg PO DAILY


   Gabapentin [Neurontin] 800 mg PO TID


   Insulin Glargine,Hum.rec.anlog [Lantus Solostar] 50 unit SQ DAILY


   Omeprazole [PriLOSEC] 40 mg PO DAILY #30 capsule.


   Canagliflozin [Invokana] 300 mg PO DAILY


   Aspirin EC [Ecotrin Low Dose] 81 mg PO DAILY


   Atorvastatin [Lipitor] 20 mg PO DAILY


   Nystatin 100,000Unit/gm Cream [Mycostatin Cream] 1 applic TOPICAL BID


   Ibuprofen [Motrin] 800 mg PO Q6H PRN


     PRN Reason: Pain


   Ergocalciferol (Vitamin D2) [Drisdol (50,000 Iu)] 1,250 mcg PO Q7D


   Cetirizine HCl [Zyrtec] 10 mg PO DAILY


Discharge Medication List





Montelukast [Singulair] 10 mg PO DAILY 02/13/18 [History]


Pantoprazole Sodium [Protonix] 40 mg PO DAILY 02/13/18 [History]


amLODIPine [Norvasc] 10 mg PO DAILY 02/13/18 [History]


Mirabegron [Myrbetriq] 50 mg PO DAILY 09/02/20 [History]


PARoxetine HCL 30 mg PO DAILY 09/02/20 [History]


metFORMIN HCL [metFORMIN HCL ER] 1,000 mg PO AC-BID 09/02/20 [History]


Allopurinol [Zyloprim] 100 mg PO DAILY 01/19/21 [History]


Gabapentin [Neurontin] 800 mg PO TID 01/19/21 [History]


Insulin Glargine,Hum.rec.anlog [Lantus Solostar] 50 unit SQ DAILY 01/19/21 

[History]


Omeprazole [PriLOSEC] 40 mg PO DAILY #30 capsule. 03/03/21 [Rx]


Aspirin EC [Ecotrin Low Dose] 81 mg PO DAILY 03/08/21 [History]


Atorvastatin [Lipitor] 20 mg PO DAILY 03/08/21 [History]


Canagliflozin [Invokana] 300 mg PO DAILY 03/08/21 [History]


Cetirizine HCl [Zyrtec] 10 mg PO DAILY 03/08/21 [History]


Ergocalciferol (Vitamin D2) [Drisdol (50,000 Iu)] 1,250 mcg PO Q7D 03/08/21 

[History]


Ibuprofen [Motrin] 800 mg PO Q6H PRN 03/08/21 [History]


Nystatin 100,000Unit/gm Cream [Mycostatin Cream] 1 applic TOPICAL BID 03/08/21 

[History]








Follow up Appointment(s)/Referral(s): 


Siva Rodgers MD [Medical Doctor] - 03/18/21 1:00 pm (Call office for new 

patient registration.)


Patient Instructions/Handouts:  Dehydration (DC), Hyperosmolar Hyperglycemic 

State (DC)


Discharge Disposition: HOME SELF-CARE

## 2021-03-12 ENCOUNTER — HOSPITAL ENCOUNTER (EMERGENCY)
Dept: HOSPITAL 47 - EC | Age: 56
LOS: 1 days | Discharge: HOME | End: 2021-03-13
Payer: MEDICARE

## 2021-03-12 DIAGNOSIS — U07.1: Primary | ICD-10-CM

## 2021-03-12 DIAGNOSIS — E86.0: ICD-10-CM

## 2021-03-12 DIAGNOSIS — Z87.891: ICD-10-CM

## 2021-03-12 DIAGNOSIS — Z79.82: ICD-10-CM

## 2021-03-12 DIAGNOSIS — E11.36: ICD-10-CM

## 2021-03-12 DIAGNOSIS — Z79.4: ICD-10-CM

## 2021-03-12 DIAGNOSIS — Z79.1: ICD-10-CM

## 2021-03-12 DIAGNOSIS — I10: ICD-10-CM

## 2021-03-12 DIAGNOSIS — E78.5: ICD-10-CM

## 2021-03-12 LAB
ALBUMIN SERPL-MCNC: 3.5 G/DL (ref 3.5–5)
ALP SERPL-CCNC: 104 U/L (ref 38–126)
ALT SERPL-CCNC: 28 U/L (ref 4–34)
ANION GAP SERPL CALC-SCNC: 15 MMOL/L
APTT BLD: 23.3 SEC (ref 22–30)
AST SERPL-CCNC: 41 U/L (ref 14–36)
BASOPHILS # BLD AUTO: 0 K/UL (ref 0–0.2)
BASOPHILS NFR BLD AUTO: 0 %
BUN SERPL-SCNC: 6 MG/DL (ref 7–17)
CALCIUM SPEC-MCNC: 8.5 MG/DL (ref 8.4–10.2)
CHLORIDE SERPL-SCNC: 99 MMOL/L (ref 98–107)
CO2 SERPL-SCNC: 20 MMOL/L (ref 22–30)
EOSINOPHIL # BLD AUTO: 0.2 K/UL (ref 0–0.7)
EOSINOPHIL NFR BLD AUTO: 2 %
ERYTHROCYTE [DISTWIDTH] IN BLOOD BY AUTOMATED COUNT: 4.77 M/UL (ref 3.8–5.4)
ERYTHROCYTE [DISTWIDTH] IN BLOOD: 18.4 % (ref 11.5–15.5)
GLUCOSE SERPL-MCNC: 175 MG/DL (ref 74–99)
HCT VFR BLD AUTO: 38.7 % (ref 34–46)
HGB BLD-MCNC: 13.3 GM/DL (ref 11.4–16)
INR PPP: 0.9 (ref ?–1.2)
LDH SPEC-CCNC: 661 U/L (ref 313–618)
LYMPHOCYTES # SPEC AUTO: 1.4 K/UL (ref 1–4.8)
LYMPHOCYTES NFR SPEC AUTO: 17 %
MAGNESIUM SPEC-SCNC: 2.2 MG/DL (ref 1.6–2.3)
MCH RBC QN AUTO: 27.8 PG (ref 25–35)
MCHC RBC AUTO-ENTMCNC: 34.3 G/DL (ref 31–37)
MCV RBC AUTO: 81.1 FL (ref 80–100)
MONOCYTES # BLD AUTO: 0.8 K/UL (ref 0–1)
MONOCYTES NFR BLD AUTO: 10 %
NEUTROPHILS # BLD AUTO: 5.8 K/UL (ref 1.3–7.7)
NEUTROPHILS NFR BLD AUTO: 69 %
PLATELET # BLD AUTO: 190 K/UL (ref 150–450)
POTASSIUM SERPL-SCNC: 4 MMOL/L (ref 3.5–5.1)
PROT SERPL-MCNC: 5.9 G/DL (ref 6.3–8.2)
PT BLD: 10.2 SEC (ref 9–12)
SODIUM SERPL-SCNC: 134 MMOL/L (ref 137–145)
WBC # BLD AUTO: 8.5 K/UL (ref 3.8–10.6)

## 2021-03-12 PROCEDURE — 93005 ELECTROCARDIOGRAM TRACING: CPT

## 2021-03-12 PROCEDURE — 83615 LACTATE (LD) (LDH) ENZYME: CPT

## 2021-03-12 PROCEDURE — 80053 COMPREHEN METABOLIC PANEL: CPT

## 2021-03-12 PROCEDURE — 85025 COMPLETE CBC W/AUTO DIFF WBC: CPT

## 2021-03-12 PROCEDURE — 82728 ASSAY OF FERRITIN: CPT

## 2021-03-12 PROCEDURE — 83735 ASSAY OF MAGNESIUM: CPT

## 2021-03-12 PROCEDURE — 71045 X-RAY EXAM CHEST 1 VIEW: CPT

## 2021-03-12 PROCEDURE — 85610 PROTHROMBIN TIME: CPT

## 2021-03-12 PROCEDURE — 36415 COLL VENOUS BLD VENIPUNCTURE: CPT

## 2021-03-12 PROCEDURE — 87040 BLOOD CULTURE FOR BACTERIA: CPT

## 2021-03-12 PROCEDURE — 85730 THROMBOPLASTIN TIME PARTIAL: CPT

## 2021-03-12 PROCEDURE — 86140 C-REACTIVE PROTEIN: CPT

## 2021-03-12 PROCEDURE — 99285 EMERGENCY DEPT VISIT HI MDM: CPT

## 2021-03-12 PROCEDURE — 83605 ASSAY OF LACTIC ACID: CPT

## 2021-03-12 PROCEDURE — 96374 THER/PROPH/DIAG INJ IV PUSH: CPT

## 2021-03-12 NOTE — ED
General Adult HPI





- General


Chief complaint: Shortness of Breath


Stated complaint: COVID+, EMILY, dizziness


Time Seen by Provider: 21 21:48


Source: patient


Mode of arrival: wheelchair


Limitations: no limitations





- History of Present Illness


Initial comments: 





Patient is a 55-year-old female, history of diabetes, hypertension, presenting 

to emergency Department with complaints of increasing cough, headache and 

dizziness when she stands up.  Patient was in the ER yesterday, diagnosed with 

Covid.  A little bit worse today and also inquiring about antibody treatment.  

Patient states she is still having low-grade temperatures, she is taking Tylenol

at home.  She has been drinking a little bit of water, low appetite.  She denies

any abdominal pain, some mild nausea, no vomiting or diarrhea.  She denies any 

chest pains, mild shortness of breath with exertion.  She has no further 

complaints at this time.  Upon arrival to the ER, her temperature is 99.8, 97% 

on room air, rest of vitals normal.





- Related Data


                                Home Medications











 Medication  Instructions  Recorded  Confirmed


 


Montelukast [Singulair] 10 mg PO DAILY 18


 


Pantoprazole Sodium [Protonix] 40 mg PO DAILY 18


 


amLODIPine [Norvasc] 10 mg PO DAILY 18


 


Mirabegron [Myrbetriq] 50 mg PO DAILY 20


 


PARoxetine HCL 30 mg PO DAILY 20


 


metFORMIN HCL [metFORMIN HCL ER] 1,000 mg PO AC-BID 20


 


Allopurinol [Zyloprim] 100 mg PO DAILY 21


 


Gabapentin [Neurontin] 800 mg PO TID 21


 


Insulin Glargine,Hum.rec.anlog 50 unit SQ DAILY 21





[Lantus Solostar]   


 


Aspirin EC [Ecotrin Low Dose] 81 mg PO DAILY 21


 


Atorvastatin [Lipitor] 20 mg PO DAILY 21


 


Canagliflozin [Invokana] 300 mg PO DAILY 21


 


Cetirizine HCl [Zyrtec] 10 mg PO DAILY 21


 


Ergocalciferol (Vitamin D2) 1,250 mcg PO Q7D 21





[Drisdol (50,000 Iu)]   


 


Ibuprofen [Motrin] 800 mg PO Q6H PRN 21


 


Nystatin 100,000Unit/gm Cream 1 applic TOPICAL BID 21





[Mycostatin Cream]   








                                  Previous Rx's











 Medication  Instructions  Recorded


 


Omeprazole [PriLOSEC] 40 mg PO DAILY #30 capsule. 21











                                    Allergies











Allergy/AdvReac Type Severity Reaction Status Date / Time


 


Sulfa (Sulfonamide Allergy  Anaphylaxis Verified 21 21:46





Antibiotics)     














Review of Systems


ROS Statement: 


Those systems with pertinent positive or pertinent negative responses have been 

documented in the HPI.





ROS Other: All systems not noted in ROS Statement are negative.





Past Medical History


Past Medical History: Diabetes Mellitus, Eye Disorder, Hyperlipidemia, 

Hypertension


Additional Past Medical History / Comment(s): 2/25/15  Pt presented to [facility] ER 

with substernal chest pain that started 24 hhours before coming to ER.  She 

noticed the chest pain when she woke up yesterday.  It is a heaviness and is 

intermittent with variable duration.  She also states she had alittle nausea 

with it.   Other HX:  Pt had recent (1/21/15)cataract surgery with lens implants

 bilaterally at [facility]-post op she had low O2 saturations and was 

told she had a very narrow airway-instead of going home same day, she was in the

 hospital for a couple days until her saturations improved.  She was discharged 

with home O2 which she wears at 2L/NC at [facility] and was to follow up today for 

testing for sleep apnea.  Pt states she also has diarrhea fairly frequently. 

Also has hx of 3 ruptured cervical discs with surgery and bilateral retina 

repair.  2018 - intermittently in hospital for 3 months for fluid on lungs, had 

fluid removed


History of Any Multi-Drug Resistant Organisms: MRSA


Date of last positivie culture/infection: 


MDRO Source:: legs and breasts.


Past Surgical History: Bariatric Surgery, Cholecystectomy, Hysterectomy, 

Orthopedic Surgery


Additional Past Surgical History / Comment(s): Bilateral cataract sx with lens 

implants, bilateral retinal repair. Cervical rodding for 3 ruptured discs.  

sleeve gastrectomy         3-1-21


Past Anesthesia/Blood Transfusion Reactions: Postoperative Nausea & Vomiting 

(PONV)


Additional Past Anesthesia/Blood Transfusion Reaction / Comment(s): Pt had 

cataract and lens implant at [facility] on 1/21/15 and afterwards she 

desaturated and was hospitalized.  She was told she has a very narrow airway.  

Pt has never recieved blood.


Past Psychological History: No Psychological Hx Reported


Smoking Status: Former smoker


Past Alcohol Use History: None Reported


Past Drug Use History: None Reported





- Past Family History


  ** Father


Family Medical History: Cancer, Myocardial Infarction (MI)


Additional Family Medical History / Comment(s): Father had 5 MI's and  of 

bone cancer.





  ** Mother


Family Medical History: Cancer


Additional Family Medical History / Comment(s): cervical cancer





General Exam





- General Exam Comments


Initial Comments: 





GENERAL: 


Patient is well-developed and well-nourished.  Patient is nontoxic and in no 

acute distress.





HEAD: 


Atraumatic, normocephalic.





EYES:


Pupils equal round and reactive to light, extraocular movements intact, sclera 

anicteric, conjunctiva are normal.  Eyelids were unremarkable.





ENT: 


TMs normal, nares patent, oropharynx clear without exudates.  Moist mucous 

membranes.





NECK: 


Normal range of motion, supple without lymphadenopathy or JVD.





LUNGS:


Unlabored respirations.  Breath sounds clear to auscultation bilaterally and 

equal.  No wheezes rales or rhonchi.





HEART:


Regular rate and rhythm without murmurs, rubs or gallops.





ABDOMEN: 


Soft, nontender, normoactive bowel sounds.  No guarding, no rebound.  No masses 

appreciated.





: Deferred 





MUSCULOSKELETAL: 


Normal extremities with adequate strength and normal range of motion, no pitting

 or edema.  No clubbing or cyanosis.





NEUROLOGICAL: 


Patient is alert and oriented x 3.  Motor and sensory are also intact.  Cranial 

nerves II through XII grossly intact.  Symmetrical smile.  Normal speech, normal

 gait.   





PSYCH:


Normal mood, normal affect.





SKIN:


 Warm, Dry, normal turgor, no rashes or lesions noted.


Limitations: no limitations





Course


                                   Vital Signs











  21





  21:43 22:50 00:18


 


Temperature 99.8 F H  


 


Pulse Rate 86  80


 


Respiratory 20 18 16





Rate   


 


Blood Pressure 128/68  131/56


 


O2 Sat by Pulse 97  94 L





Oximetry   














  21





  01:34


 


Temperature 99.0 F


 


Pulse Rate 87


 


Respiratory 16





Rate 


 


Blood Pressure 129/73


 


O2 Sat by Pulse 98





Oximetry 














EKG Findings





- EKG Comments:


EKG Findings:: Normal sinus rhythm, normal ECG, no signs of acute process.  

Ventricular rate 80, OR interval 172, .





Medical Decision Making





- Medical Decision Making





Patient is a 55-year-old female with history of diabetes, hypertension, 

presenting for increase in cough, shortness of breath with exertion, dizziness 

when she stands up since yesterday.  She was diagnosed with Covid yesterday.  

She is also inquiring about antibody treatment.  Her vital signs are stable 

today.  Her exam is unremarkable, she is in no acute distress.  EKG shows no 

acute process.  Chest x-ray shows mild subsegmental atelectasis, no heart 

failure, no pleural effusion.  Labs show a normal white count, CRP is up at 32, 

lactic acid is normal at 1.1.  Patient's vital signs remained stable here.  

Patient does qualify for Covid antibody treatment.  Patient tolerated treatment 

well.  Patient stable for discharge.  Recommend continuing with Tylenol or 

Motrin for any discomfort, follow-up with her regular doctor.  She is in 

agreement with this plan of care.  She stable for discharge.  Return parameters 

were discussed with the patient she verbalized understanding.  Case discussed 

with Dr. Hernandez.





- Lab Data


Result diagrams: 


                                 21 23:10





                                 21 23:10


                                   Lab Results











  21 Range/Units





  23:10 23:10 23:10 


 


WBC  8.5    (3.8-10.6)  k/uL


 


RBC  4.77    (3.80-5.40)  m/uL


 


Hgb  13.3    (11.4-16.0)  gm/dL


 


Hct  38.7    (34.0-46.0)  %


 


MCV  81.1    (80.0-100.0)  fL


 


MCH  27.8    (25.0-35.0)  pg


 


MCHC  34.3    (31.0-37.0)  g/dL


 


RDW  18.4 H    (11.5-15.5)  %


 


Plt Count  190    (150-450)  k/uL


 


MPV  8.1    


 


Neutrophils %  69    %


 


Lymphocytes %  17    %


 


Monocytes %  10    %


 


Eosinophils %  2    %


 


Basophils %  0    %


 


Neutrophils #  5.8    (1.3-7.7)  k/uL


 


Lymphocytes #  1.4    (1.0-4.8)  k/uL


 


Monocytes #  0.8    (0-1.0)  k/uL


 


Eosinophils #  0.2    (0-0.7)  k/uL


 


Basophils #  0.0    (0-0.2)  k/uL


 


Anisocytosis  Slight    


 


Microcytosis  Slight    


 


PT   10.2   (9.0-12.0)  sec


 


INR   0.9   (<1.2)  


 


APTT   23.3   (22.0-30.0)  sec


 


Sodium    134 L  (137-145)  mmol/L


 


Potassium    4.0  (3.5-5.1)  mmol/L


 


Chloride    99  ()  mmol/L


 


Carbon Dioxide    20 L  (22-30)  mmol/L


 


Anion Gap    15  mmol/L


 


BUN    6 L  (7-17)  mg/dL


 


Creatinine    0.64  (0.52-1.04)  mg/dL


 


Est GFR (CKD-EPI)AfAm    >90  (>60 ml/min/1.73 sqM)  


 


Est GFR (CKD-EPI)NonAf    >90  (>60 ml/min/1.73 sqM)  


 


Glucose    175 H  (74-99)  mg/dL


 


Plasma Lactic Acid Dilan     (0.7-2.0)  mmol/L


 


Calcium    8.5  (8.4-10.2)  mg/dL


 


Magnesium    2.2  (1.6-2.3)  mg/dL


 


Total Bilirubin    0.5  (0.2-1.3)  mg/dL


 


AST    41 H  (14-36)  U/L


 


ALT    28  (4-34)  U/L


 


Alkaline Phosphatase    104  ()  U/L


 


Lactate Dehydrogenase    661 H  (313-618)  U/L


 


C-Reactive Protein    32.4 H  (<10.0)  mg/L


 


Total Protein    5.9 L  (6.3-8.2)  g/dL


 


Albumin    3.5  (3.5-5.0)  g/dL














  21 Range/Units





  23:10 


 


WBC   (3.8-10.6)  k/uL


 


RBC   (3.80-5.40)  m/uL


 


Hgb   (11.4-16.0)  gm/dL


 


Hct   (34.0-46.0)  %


 


MCV   (80.0-100.0)  fL


 


MCH   (25.0-35.0)  pg


 


MCHC   (31.0-37.0)  g/dL


 


RDW   (11.5-15.5)  %


 


Plt Count   (150-450)  k/uL


 


MPV   


 


Neutrophils %   %


 


Lymphocytes %   %


 


Monocytes %   %


 


Eosinophils %   %


 


Basophils %   %


 


Neutrophils #   (1.3-7.7)  k/uL


 


Lymphocytes #   (1.0-4.8)  k/uL


 


Monocytes #   (0-1.0)  k/uL


 


Eosinophils #   (0-0.7)  k/uL


 


Basophils #   (0-0.2)  k/uL


 


Anisocytosis   


 


Microcytosis   


 


PT   (9.0-12.0)  sec


 


INR   (<1.2)  


 


APTT   (22.0-30.0)  sec


 


Sodium   (137-145)  mmol/L


 


Potassium   (3.5-5.1)  mmol/L


 


Chloride   ()  mmol/L


 


Carbon Dioxide   (22-30)  mmol/L


 


Anion Gap   mmol/L


 


BUN   (7-17)  mg/dL


 


Creatinine   (0.52-1.04)  mg/dL


 


Est GFR (CKD-EPI)AfAm   (>60 ml/min/1.73 sqM)  


 


Est GFR (CKD-EPI)NonAf   (>60 ml/min/1.73 sqM)  


 


Glucose   (74-99)  mg/dL


 


Plasma Lactic Acid Dilan  1.0  (0.7-2.0)  mmol/L


 


Calcium   (8.4-10.2)  mg/dL


 


Magnesium   (1.6-2.3)  mg/dL


 


Total Bilirubin   (0.2-1.3)  mg/dL


 


AST   (14-36)  U/L


 


ALT   (4-34)  U/L


 


Alkaline Phosphatase   ()  U/L


 


Lactate Dehydrogenase   (313-618)  U/L


 


C-Reactive Protein   (<10.0)  mg/L


 


Total Protein   (6.3-8.2)  g/dL


 


Albumin   (3.5-5.0)  g/dL














Disposition


Clinical Impression: 


 COVID-19, Dehydration





Disposition: HOME SELF-CARE


Condition: Stable


Instructions (If sedation given, give patient instructions):  Coronavirus 

Disease 2019 (COVID-19)


Additional Instructions: 


Please return to the Emergency Department if symptoms worsen or any other 

concerns.


Continue with Tylenol Motrin for fever control.


Increase fluid intake.


Please follow-up with your regular doctor.


Is patient prescribed a controlled substance at d/c from ED?: No


Referrals: 


Rosas Higginbotham MD [Primary Care Provider] - 1-2 days

## 2021-03-12 NOTE — XR
EXAMINATION TYPE: XR chest 1V portable

 

DATE OF EXAM: 3/12/2021

 

COMPARISON: 2/13/2018

 

HISTORY: Chest pain

 

 There is some linear density left midlung. The other lung fields are clear. There are no hilar isabela
s. Heart size is normal. Bony thorax is intact. There is no pleural effusion.

 

 

IMPRESSION: Mild subsegmental atelectasis. Pleural fluid and infiltrate mostly cleared compared to ol
d exam in the left lower lobe. No heart failure.

## 2021-03-13 VITALS — HEART RATE: 87 BPM | SYSTOLIC BLOOD PRESSURE: 129 MMHG | DIASTOLIC BLOOD PRESSURE: 73 MMHG | TEMPERATURE: 99 F

## 2021-03-13 VITALS — RESPIRATION RATE: 16 BRPM

## 2021-03-13 LAB — FERRITIN SERPL-MCNC: 1693.9 NG/ML (ref 10–291)

## 2021-03-31 ENCOUNTER — HOSPITAL ENCOUNTER (OUTPATIENT)
Dept: HOSPITAL 47 - BARWHC3 | Age: 56
End: 2021-03-31
Attending: SURGERY
Payer: MEDICARE

## 2021-03-31 VITALS
RESPIRATION RATE: 18 BRPM | TEMPERATURE: 98.4 F | SYSTOLIC BLOOD PRESSURE: 109 MMHG | DIASTOLIC BLOOD PRESSURE: 69 MMHG | HEART RATE: 87 BPM

## 2021-03-31 VITALS — BODY MASS INDEX: 50.4 KG/M2

## 2021-03-31 DIAGNOSIS — N19: ICD-10-CM

## 2021-03-31 DIAGNOSIS — E44.0: ICD-10-CM

## 2021-03-31 DIAGNOSIS — K50.90: ICD-10-CM

## 2021-03-31 DIAGNOSIS — E55.9: ICD-10-CM

## 2021-03-31 DIAGNOSIS — E66.01: Primary | ICD-10-CM

## 2021-03-31 DIAGNOSIS — E89.1: ICD-10-CM

## 2021-03-31 DIAGNOSIS — Z87.891: ICD-10-CM

## 2021-03-31 DIAGNOSIS — K74.1: ICD-10-CM

## 2021-03-31 DIAGNOSIS — D50.8: ICD-10-CM

## 2021-03-31 LAB
APTT BLD: 23.7 SEC (ref 23.5–31)
ERYTHROCYTE [DISTWIDTH] IN BLOOD BY AUTOMATED COUNT: 4.35 M/UL (ref 3.8–5.4)
ERYTHROCYTE [DISTWIDTH] IN BLOOD: 18.5 % (ref 11.5–15.5)
HCT VFR BLD AUTO: 36.8 % (ref 34–46)
HGB BLD-MCNC: 12.6 GM/DL (ref 11.4–16)
INR PPP: 0.94 (ref 0.9–1.11)
MCH RBC QN AUTO: 29 PG (ref 25–35)
MCHC RBC AUTO-ENTMCNC: 34.3 G/DL (ref 31–37)
MCV RBC AUTO: 84.5 FL (ref 80–100)
PLATELET # BLD AUTO: 233 K/UL (ref 150–450)
PT BLD: 10.3 SEC (ref 9.9–11.9)
WBC # BLD AUTO: 10.4 K/UL (ref 3.8–10.6)

## 2021-03-31 PROCEDURE — 84630 ASSAY OF ZINC: CPT

## 2021-03-31 PROCEDURE — 82525 ASSAY OF COPPER: CPT

## 2021-03-31 PROCEDURE — 80053 COMPREHEN METABOLIC PANEL: CPT

## 2021-03-31 PROCEDURE — 84134 ASSAY OF PREALBUMIN: CPT

## 2021-03-31 PROCEDURE — 83540 ASSAY OF IRON: CPT

## 2021-03-31 PROCEDURE — 84255 ASSAY OF SELENIUM: CPT

## 2021-03-31 PROCEDURE — 99211 OFF/OP EST MAY X REQ PHY/QHP: CPT

## 2021-03-31 PROCEDURE — 84100 ASSAY OF PHOSPHORUS: CPT

## 2021-03-31 PROCEDURE — 85730 THROMBOPLASTIN TIME PARTIAL: CPT

## 2021-03-31 PROCEDURE — 82728 ASSAY OF FERRITIN: CPT

## 2021-03-31 PROCEDURE — 84425 ASSAY OF VITAMIN B-1: CPT

## 2021-03-31 PROCEDURE — 82746 ASSAY OF FOLIC ACID SERUM: CPT

## 2021-03-31 PROCEDURE — 83036 HEMOGLOBIN GLYCOSYLATED A1C: CPT

## 2021-03-31 PROCEDURE — 83550 IRON BINDING TEST: CPT

## 2021-03-31 PROCEDURE — 80061 LIPID PANEL: CPT

## 2021-03-31 PROCEDURE — 85027 COMPLETE CBC AUTOMATED: CPT

## 2021-03-31 PROCEDURE — 85610 PROTHROMBIN TIME: CPT

## 2021-03-31 PROCEDURE — 83970 ASSAY OF PARATHORMONE: CPT

## 2021-03-31 PROCEDURE — 84443 ASSAY THYROID STIM HORMONE: CPT

## 2021-03-31 PROCEDURE — 97803 MED NUTRITION INDIV SUBSEQ: CPT

## 2021-03-31 PROCEDURE — 83735 ASSAY OF MAGNESIUM: CPT

## 2021-03-31 PROCEDURE — 82306 VITAMIN D 25 HYDROXY: CPT

## 2021-03-31 PROCEDURE — 82607 VITAMIN B-12: CPT

## 2021-03-31 PROCEDURE — 84590 ASSAY OF VITAMIN A: CPT

## 2021-03-31 NOTE — P.PN
Subjective


Progress Note Date: 03/31/21





She caught COVID 2 weeks ago. She had gone to the ER for dehydration. She has 

lost 60 pounds. She has mild erythema.  Weight loss for panniculitis. She 

reports minimal emesis and she is eating to fast and eating too quickly. Still 

using omeprazole. Recommend blood work in 1 week. She is on 1 protein shake. 

Recommend 3 protein shake. 





Objective





- Vital Signs


Vital signs: 


                                   Vital Signs











Temp  98.4 F   03/31/21 14:33


 


Pulse  87   03/31/21 14:33


 


Resp  18   03/31/21 14:33


 


BP  109/69   03/31/21 14:33


 


Pulse Ox      








                                 Intake & Output











 03/30/21 03/31/21 03/31/21





 18:59 06:59 18:59


 


Weight   137.438 kg

## 2021-04-01 LAB
ALBUMIN SERPL-MCNC: 4 G/DL (ref 3.8–4.9)
ALBUMIN/GLOB SERPL: 1.9 G/DL (ref 1.6–3.17)
ALP SERPL-CCNC: 98 U/L (ref 41–126)
ALT SERPL-CCNC: 41 U/L (ref 8–44)
ANION GAP SERPL CALC-SCNC: 10.3 MMOL/L (ref 4–12)
AST SERPL-CCNC: 45 U/L (ref 13–35)
BUN SERPL-SCNC: 16 MG/DL (ref 9–27)
BUN/CREAT SERPL: 20 RATIO (ref 12–20)
CALCIUM SPEC-MCNC: 9.2 MG/DL (ref 8.7–10.3)
CHLORIDE SERPL-SCNC: 105 MMOL/L (ref 96–109)
CHOLEST SERPL-MCNC: 149 MG/DL (ref 0–200)
CO2 SERPL-SCNC: 27.7 MMOL/L (ref 21.6–31.8)
FERRITIN SERPL-MCNC: 918.9 NG/ML (ref 10–291)
GLOBULIN SER CALC-MCNC: 2.1 G/DL (ref 1.6–3.3)
GLUCOSE SERPL-MCNC: 248 MG/DL (ref 70–110)
HBA1C MFR BLD: 10.5 % (ref 4–6)
HDLC SERPL-MCNC: 42 MG/DL (ref 40–60)
IRON SERPL-MCNC: 40 UG/DL (ref 50–170)
LDLC SERPL CALC-MCNC: 68.8 MG/DL (ref 0–131)
MAGNESIUM SPEC-SCNC: 1.7 MG/DL (ref 1.5–2.4)
POTASSIUM SERPL-SCNC: 4.3 MMOL/L (ref 3.5–5.5)
PREALB SERPL-MCNC: 19 MG/DL (ref 18–42)
PROT SERPL-MCNC: 6.1 G/DL (ref 6.2–8.2)
SODIUM SERPL-SCNC: 143 MMOL/L (ref 135–145)
TIBC SERPL-MCNC: 257 UG/DL (ref 228–460)
TRIGL SERPL-MCNC: 191 MG/DL (ref 0–149)
VIT B12 SERPL-MCNC: 792 PG/ML (ref 200–944)
VLDLC SERPL CALC-MCNC: 38.2 MG/DL (ref 5–40)
ZINC SERPL-MCNC: 61 UG/DL (ref 60–130)

## 2021-04-02 LAB — VIT B1 BLD-MCNC: 69 UG/L (ref 38–122)

## 2021-04-27 ENCOUNTER — HOSPITAL ENCOUNTER (EMERGENCY)
Dept: HOSPITAL 47 - EC | Age: 56
LOS: 1 days | Discharge: HOME | End: 2021-04-28
Payer: MEDICARE

## 2021-04-27 VITALS — RESPIRATION RATE: 18 BRPM

## 2021-04-27 DIAGNOSIS — R51.9: ICD-10-CM

## 2021-04-27 DIAGNOSIS — Z79.51: ICD-10-CM

## 2021-04-27 DIAGNOSIS — Z88.2: ICD-10-CM

## 2021-04-27 DIAGNOSIS — Z79.4: ICD-10-CM

## 2021-04-27 DIAGNOSIS — Z20.822: ICD-10-CM

## 2021-04-27 DIAGNOSIS — R10.13: Primary | ICD-10-CM

## 2021-04-27 DIAGNOSIS — Z98.84: ICD-10-CM

## 2021-04-27 DIAGNOSIS — Z79.899: ICD-10-CM

## 2021-04-27 DIAGNOSIS — R42: ICD-10-CM

## 2021-04-27 DIAGNOSIS — Z79.82: ICD-10-CM

## 2021-04-27 DIAGNOSIS — E78.5: ICD-10-CM

## 2021-04-27 DIAGNOSIS — I10: ICD-10-CM

## 2021-04-27 DIAGNOSIS — Z87.891: ICD-10-CM

## 2021-04-27 DIAGNOSIS — E11.9: ICD-10-CM

## 2021-04-27 LAB
ALBUMIN SERPL-MCNC: 4.2 G/DL (ref 3.5–5)
ALP SERPL-CCNC: 116 U/L (ref 38–126)
ALT SERPL-CCNC: 25 U/L (ref 4–34)
ANION GAP SERPL CALC-SCNC: 8 MMOL/L
AST SERPL-CCNC: 34 U/L (ref 14–36)
BASOPHILS # BLD AUTO: 0.1 K/UL (ref 0–0.2)
BASOPHILS NFR BLD AUTO: 1 %
BUN SERPL-SCNC: 16 MG/DL (ref 7–17)
CALCIUM SPEC-MCNC: 9.6 MG/DL (ref 8.4–10.2)
CHLORIDE SERPL-SCNC: 102 MMOL/L (ref 98–107)
CO2 SERPL-SCNC: 28 MMOL/L (ref 22–30)
EOSINOPHIL # BLD AUTO: 0.2 K/UL (ref 0–0.7)
EOSINOPHIL NFR BLD AUTO: 2 %
ERYTHROCYTE [DISTWIDTH] IN BLOOD BY AUTOMATED COUNT: 4.53 M/UL (ref 3.8–5.4)
ERYTHROCYTE [DISTWIDTH] IN BLOOD: 16.6 % (ref 11.5–15.5)
GLUCOSE SERPL-MCNC: 170 MG/DL (ref 74–99)
HCT VFR BLD AUTO: 38.6 % (ref 34–46)
HGB BLD-MCNC: 13.1 GM/DL (ref 11.4–16)
INR PPP: 1 (ref ?–1.2)
LYMPHOCYTES # SPEC AUTO: 2 K/UL (ref 1–4.8)
LYMPHOCYTES NFR SPEC AUTO: 24 %
MCH RBC QN AUTO: 28.9 PG (ref 25–35)
MCHC RBC AUTO-ENTMCNC: 33.9 G/DL (ref 31–37)
MCV RBC AUTO: 85.2 FL (ref 80–100)
MONOCYTES # BLD AUTO: 0.5 K/UL (ref 0–1)
MONOCYTES NFR BLD AUTO: 6 %
NEUTROPHILS # BLD AUTO: 5.6 K/UL (ref 1.3–7.7)
NEUTROPHILS NFR BLD AUTO: 66 %
PLATELET # BLD AUTO: 240 K/UL (ref 150–450)
POTASSIUM SERPL-SCNC: 4 MMOL/L (ref 3.5–5.1)
PROT SERPL-MCNC: 7 G/DL (ref 6.3–8.2)
PT BLD: 10.4 SEC (ref 9–12)
SODIUM SERPL-SCNC: 138 MMOL/L (ref 137–145)
WBC # BLD AUTO: 8.5 K/UL (ref 3.8–10.6)

## 2021-04-27 PROCEDURE — 96374 THER/PROPH/DIAG INJ IV PUSH: CPT

## 2021-04-27 PROCEDURE — 80053 COMPREHEN METABOLIC PANEL: CPT

## 2021-04-27 PROCEDURE — 85025 COMPLETE CBC W/AUTO DIFF WBC: CPT

## 2021-04-27 PROCEDURE — 87086 URINE CULTURE/COLONY COUNT: CPT

## 2021-04-27 PROCEDURE — 74018 RADEX ABDOMEN 1 VIEW: CPT

## 2021-04-27 PROCEDURE — 87636 SARSCOV2 & INF A&B AMP PRB: CPT

## 2021-04-27 PROCEDURE — 96375 TX/PRO/DX INJ NEW DRUG ADDON: CPT

## 2021-04-27 PROCEDURE — 85610 PROTHROMBIN TIME: CPT

## 2021-04-27 PROCEDURE — 36415 COLL VENOUS BLD VENIPUNCTURE: CPT

## 2021-04-27 PROCEDURE — 96361 HYDRATE IV INFUSION ADD-ON: CPT

## 2021-04-27 PROCEDURE — 81001 URINALYSIS AUTO W/SCOPE: CPT

## 2021-04-27 PROCEDURE — 93005 ELECTROCARDIOGRAM TRACING: CPT

## 2021-04-27 PROCEDURE — 99284 EMERGENCY DEPT VISIT MOD MDM: CPT

## 2021-04-27 PROCEDURE — 84484 ASSAY OF TROPONIN QUANT: CPT

## 2021-04-27 PROCEDURE — 83605 ASSAY OF LACTIC ACID: CPT

## 2021-04-27 NOTE — XR
EXAMINATION TYPE: XR abdomen 1V

 

DATE OF EXAM: 4/27/2021

 

COMPARISON: 3/7/2021

 

HISTORY: Pain

 

TECHNIQUE: 2 views upright

 

FINDINGS: There is no sign of intestinal obstruction or pneumoperitoneum. Fecal pattern is normal. Th
ere are clips from cholecystectomy. Lung bases are clear. There are no pathologic calcifications over
 the kidneys.

 

IMPRESSION: Nonacute abdomen.

## 2021-04-27 NOTE — ED
General Adult HPI





- General


Chief complaint: Dizziness


Stated complaint: Dizziness/vomiting


Time Seen by Provider: 21 20:35


Source: patient


Mode of arrival: ambulatory


Limitations: no limitations





- History of Present Illness


Initial comments: 


Dictation was produced using dragon dictation software. please excuse any 

grammatical, word or spelling errors. 





This patient was cared for during a federal and state declared state of 

emergency secondary to Covid 19





Chief Complaint: 56-year-old female past medical history of gastric sleep 

performed 2 months ago, diabetes discussed hypertension presents with epigastric

pain 3 days





History of Present Illness: She is a 56-year-old female she presents today with 

epigastric pain, poor appetite, headache.  States she developed these symptoms 3

days ago.  2 months ago patient had a gastric sleep performed.  She had a 

relatively uncomplicated postoperative course.  Patient denies any sore throat, 

cough or shortness of breath.  She got Covid recently and was given that 

unremarkable antibody infusion.  Denies any constitutional symptoms.  Patient 

feels that she is dehydrated.  She has been having poor appetite.  She complains

of some mild nausea but no vomiting.  She states that she feels nauseated even 

with the sight of food.








The ROS documented in this emergency department record has been reviewed and 

confirmed by me.  Those systems with pertinent positive or negative responses 

have been documented in the HPI.  All other systems are other negative and/or 

noncontributory.








PHYSICAL EXAM:


General Impression: Alert and oriented x3, not in acute distress


HEENT: Normocephalic atraumatic, extra-ocular movements intact, pupils equal and

reactive to light bilaterally, mucous membranes moist.


Cardiovascular: Heart regular rate and rhythm


Chest: Able to complete full sentences, no retractions, no tachypnea


Abdomen: abdomen soft, mild tenderness to the epigastric area, non-distended, no

organomegaly


Musculoskeletal: Pulses present and equal in all extremities, no peripheral 

edema


Motor:  no focal deficits noted


Neurological: CN II-XII grossly intact, no focal motor or sensory deficits noted


Skin: Intact with no visualized rashes


Psych: Normal affect and mood





ED course: 56-year-old female presents with poor appetite, epigastric pain 

nausea signs upon arrival are within acceptable limits.  Patient is well-

appearing at bedside.  Patient has benign abdominal exam though she has some 

mild tenderness to palpation in the epigastric area.  Chart review shows that 

patient had a gastric sleep performed on  of this year.  EKGs benign





Laboratory evaluation obtained showing no acute processes.  Abdominal levels are

negative.  No inflammatory markers.  X-ray shows nonacute abdomen.  Patient 

well-appearing at bedside.  COVID-19 test is negative.  Patient stable for 

discharge.  She is advised to follow up with her primary care physician.  Return

parameters discussed.  She is feeling much better after intravenous fluids.





EKG interpretation: Ventricular rate 81, normal sinus rhythm,.  170, QRS 84, QTC

460. No VT prolongation, no QTC prolongation, no ST or T-wave changes noted.  

Overall, this EKG is unremarkable











- Related Data


                                Home Medications











 Medication  Instructions  Recorded  Confirmed


 


Montelukast [Singulair] 10 mg PO DAILY 18


 


Pantoprazole Sodium [Protonix] 40 mg PO DAILY 18


 


amLODIPine [Norvasc] 10 mg PO DAILY 18


 


Mirabegron [Myrbetriq] 50 mg PO DAILY 20


 


PARoxetine HCL 30 mg PO HS 20


 


Allopurinol [Zyloprim] 100 mg PO DAILY 21


 


Gabapentin [Neurontin] 800 mg PO TID 21


 


Insulin Glargine,Hum.rec.anlog 80 unit SQ DAILY 21





[Lantus Solostar]   


 


Aspirin EC [Ecotrin Low Dose] 81 mg PO DAILY 21


 


Atorvastatin [Lipitor] 20 mg PO DAILY 21


 


Ergocalciferol (Vitamin D2) 1,250 mcg PO WE 21





[Drisdol (50,000 Iu)]   


 


Bisacodyl 5 mg PO DAILY PRN 21


 


Cholecalciferol [Vitamin D3 (25 25 mcg PO DAILY 21





Mcg = 1000 Iu)]   


 


Escitalopram [Lexapro] 10 mg PO DAILY 21


 


Ferrous Sulfate [Feosol] 325 mg PO DAILY 21


 


INSULIN LISPRO (humaLOG) [humaLOG] 80 units SQ AC-TID PRN 21


 


Loratadine 10 mg PO DAILY 21


 


Multivitamin/Iron/Folic Acid 1 tab PO DAILY 21





[Centrum Women Tablet]   


 


Zinc 50 mg PO DAILY 21








                                  Previous Rx's











 Medication  Instructions  Recorded


 


Omeprazole [PriLOSEC] 40 mg PO DAILY #30 capsule. 21











                                    Allergies











Allergy/AdvReac Type Severity Reaction Status Date / Time


 


Sulfa (Sulfonamide Allergy  Anaphylaxis Verified 21 21:53





Antibiotics)     














Review of Systems


ROS Statement: 


Those systems with pertinent positive or pertinent negative responses have been 

documented in the HPI.





ROS Other: All systems not noted in ROS Statement are negative.





Past Medical History


Past Medical History: Diabetes Mellitus, Eye Disorder, Hyperlipidemia, 

Hypertension


Additional Past Medical History / Comment(s): 2/25/15  Pt presented to [facility] ER 

with substernal chest pain that started 24 hhours before coming to ER.  She 

noticed the chest pain when she woke up yesterday.  It is a heaviness and is 

intermittent with variable duration.  She also states she had alittle nausea 

with it.   Other HX:  Pt had recent (1/21/15)cataract surgery with lens implants

 bilaterally at [facility]-post op she had low O2 saturations and was 

told she had a very narrow airway-instead of going home same day, she was in the

 hospital for a couple days until her saturations improved.  She was discharged 

with home O2 which she wears at 2L/NC at [facility] and was to follow up today for 

testing for sleep apnea.  Pt states she also has diarrhea fairly frequently. 

Also has hx of 3 ruptured cervical discs with surgery and bilateral retina 

repair.  2018 - intermittently in hospital for 3 months for fluid on lungs, had 

fluid removed


History of Any Multi-Drug Resistant Organisms: MRSA


Date of last positivie culture/infection: 


MDRO Source:: legs and breasts.


Past Surgical History: Bariatric Surgery, Cholecystectomy, Hysterectomy, 

Orthopedic Surgery


Additional Past Surgical History / Comment(s): Bilateral cataract sx with lens 

implants, bilateral retinal repair. Cervical rodding for 3 ruptured discs.  

sleeve gastrectomy  3/1/21.


Past Anesthesia/Blood Transfusion Reactions: Postoperative Nausea & Vomiting 

(PONV)


Additional Past Anesthesia/Blood Transfusion Reaction / Comment(s): Pt had 

cataract and lens implant at [facility] on 1/21/15 and afterwards she 

desaturated and was hospitalized.  She was told she has a very narrow airway.  

Pt has never recieved blood.


Past Psychological History: No Psychological Hx Reported


Smoking Status: Former smoker


Past Alcohol Use History: None Reported


Past Drug Use History: None Reported





- Past Family History


  ** Father


Family Medical History: Cancer, Myocardial Infarction (MI)


Additional Family Medical History / Comment(s): Father had 5 MI's and  of 

bone cancer.





  ** Mother


Family Medical History: Cancer


Additional Family Medical History / Comment(s): cervical cancer





General Exam


Limitations: no limitations





Course


                                   Vital Signs











  21





  19:20


 


Temperature 98.7 F


 


Pulse Rate 84


 


Respiratory 18





Rate 


 


Blood Pressure 164/80


 


O2 Sat by Pulse 97





Oximetry 














Medical Decision Making





- Lab Data


Result diagrams: 


                                 21 19:23





                                 21 19:23


                                   Lab Results











  21 Range/Units





  19:23 19:23 19:23 


 


WBC  8.5    (3.8-10.6)  k/uL


 


RBC  4.53    (3.80-5.40)  m/uL


 


Hgb  13.1    (11.4-16.0)  gm/dL


 


Hct  38.6    (34.0-46.0)  %


 


MCV  85.2    (80.0-100.0)  fL


 


MCH  28.9    (25.0-35.0)  pg


 


MCHC  33.9    (31.0-37.0)  g/dL


 


RDW  16.6 H    (11.5-15.5)  %


 


Plt Count  240    (150-450)  k/uL


 


MPV  8.1    


 


Neutrophils %  66    %


 


Lymphocytes %  24    %


 


Monocytes %  6    %


 


Eosinophils %  2    %


 


Basophils %  1    %


 


Neutrophils #  5.6    (1.3-7.7)  k/uL


 


Lymphocytes #  2.0    (1.0-4.8)  k/uL


 


Monocytes #  0.5    (0-1.0)  k/uL


 


Eosinophils #  0.2    (0-0.7)  k/uL


 


Basophils #  0.1    (0-0.2)  k/uL


 


Anisocytosis  Slight    


 


PT   10.4   (9.0-12.0)  sec


 


INR   1.0   (<1.2)  


 


Sodium    138  (137-145)  mmol/L


 


Potassium    4.0  (3.5-5.1)  mmol/L


 


Chloride    102  ()  mmol/L


 


Carbon Dioxide    28  (22-30)  mmol/L


 


Anion Gap    8  mmol/L


 


BUN    16  (7-17)  mg/dL


 


Creatinine    0.71  (0.52-1.04)  mg/dL


 


Est GFR (CKD-EPI)AfAm    >90  (>60 ml/min/1.73 sqM)  


 


Est GFR (CKD-EPI)NonAf    >90  (>60 ml/min/1.73 sqM)  


 


Glucose    170 H  (74-99)  mg/dL


 


Plasma Lactic Acid Dilan     (0.7-2.0)  mmol/L


 


Calcium    9.6  (8.4-10.2)  mg/dL


 


Total Bilirubin    0.6  (0.2-1.3)  mg/dL


 


AST    34  (14-36)  U/L


 


ALT    25  (4-34)  U/L


 


Alkaline Phosphatase    116  ()  U/L


 


Troponin I     (0.000-0.034)  ng/mL


 


Total Protein    7.0  (6.3-8.2)  g/dL


 


Albumin    4.2  (3.5-5.0)  g/dL


 


Influenza Type A (PCR)     (Not Detectd)  


 


Influenza Type B (PCR)     (Not Detectd)  


 


RSV (PCR)     (Not Detectd)  


 


SARS-CoV-2 (PCR)     (Not Detectd)  














  21 Range/Units





  19:23 19:23 21:12 


 


WBC     (3.8-10.6)  k/uL


 


RBC     (3.80-5.40)  m/uL


 


Hgb     (11.4-16.0)  gm/dL


 


Hct     (34.0-46.0)  %


 


MCV     (80.0-100.0)  fL


 


MCH     (25.0-35.0)  pg


 


MCHC     (31.0-37.0)  g/dL


 


RDW     (11.5-15.5)  %


 


Plt Count     (150-450)  k/uL


 


MPV     


 


Neutrophils %     %


 


Lymphocytes %     %


 


Monocytes %     %


 


Eosinophils %     %


 


Basophils %     %


 


Neutrophils #     (1.3-7.7)  k/uL


 


Lymphocytes #     (1.0-4.8)  k/uL


 


Monocytes #     (0-1.0)  k/uL


 


Eosinophils #     (0-0.7)  k/uL


 


Basophils #     (0-0.2)  k/uL


 


Anisocytosis     


 


PT     (9.0-12.0)  sec


 


INR     (<1.2)  


 


Sodium     (137-145)  mmol/L


 


Potassium     (3.5-5.1)  mmol/L


 


Chloride     ()  mmol/L


 


Carbon Dioxide     (22-30)  mmol/L


 


Anion Gap     mmol/L


 


BUN     (7-17)  mg/dL


 


Creatinine     (0.52-1.04)  mg/dL


 


Est GFR (CKD-EPI)AfAm     (>60 ml/min/1.73 sqM)  


 


Est GFR (CKD-EPI)NonAf     (>60 ml/min/1.73 sqM)  


 


Glucose     (74-99)  mg/dL


 


Plasma Lactic Acid Dilan  1.1    (0.7-2.0)  mmol/L


 


Calcium     (8.4-10.2)  mg/dL


 


Total Bilirubin     (0.2-1.3)  mg/dL


 


AST     (14-36)  U/L


 


ALT     (4-34)  U/L


 


Alkaline Phosphatase     ()  U/L


 


Troponin I   <0.012   (0.000-0.034)  ng/mL


 


Total Protein     (6.3-8.2)  g/dL


 


Albumin     (3.5-5.0)  g/dL


 


Influenza Type A (PCR)    Not Detected  (Not Detectd)  


 


Influenza Type B (PCR)    Not Detected  (Not Detectd)  


 


RSV (PCR)    Not Detected  (Not Detectd)  


 


SARS-CoV-2 (PCR)    Not Detected  (Not Detectd)  














Disposition


Clinical Impression: 


 Abdominal pain





Disposition: HOME SELF-CARE


Condition: Good


Instructions (If sedation given, give patient instructions):  Abdominal Pain 

(ED)


Is patient prescribed a controlled substance at d/c from ED?: No


Referrals: 


Siva Rodgers MD [Primary Care Provider] - 1-2 days


Time of Disposition: 22:35

## 2021-04-28 VITALS — HEART RATE: 89 BPM | TEMPERATURE: 99.2 F | DIASTOLIC BLOOD PRESSURE: 65 MMHG | SYSTOLIC BLOOD PRESSURE: 142 MMHG

## 2021-04-28 LAB
HYALINE CASTS UR QL AUTO: 6 /LPF (ref 0–2)
KETONES UR QL STRIP.AUTO: (no result)
PH UR: 5.5 [PH] (ref 5–8)
PROT UR QL: (no result)
RBC UR QL: 49 /HPF (ref 0–5)
SP GR UR: 1.02 (ref 1–1.03)
SQUAMOUS UR QL AUTO: 14 /HPF (ref 0–4)
UROBILINOGEN UR QL STRIP: <2 MG/DL (ref ?–2)
WBC # UR AUTO: 162 /HPF (ref 0–5)

## 2021-05-19 ENCOUNTER — HOSPITAL ENCOUNTER (EMERGENCY)
Dept: HOSPITAL 47 - EC | Age: 56
Discharge: HOME | End: 2021-05-19
Payer: MEDICARE

## 2021-05-19 VITALS
DIASTOLIC BLOOD PRESSURE: 65 MMHG | SYSTOLIC BLOOD PRESSURE: 135 MMHG | TEMPERATURE: 98.1 F | HEART RATE: 75 BPM | RESPIRATION RATE: 20 BRPM

## 2021-05-19 DIAGNOSIS — E11.9: ICD-10-CM

## 2021-05-19 DIAGNOSIS — Z79.4: ICD-10-CM

## 2021-05-19 DIAGNOSIS — I10: ICD-10-CM

## 2021-05-19 DIAGNOSIS — E78.5: ICD-10-CM
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
## 2021-05-19 DIAGNOSIS — S82.145A: Primary | ICD-10-CM

## 2021-05-19 DIAGNOSIS — Z87.891: ICD-10-CM

## 2021-05-19 PROCEDURE — 73562 X-RAY EXAM OF KNEE 3: CPT

## 2021-05-19 PROCEDURE — 73700 CT LOWER EXTREMITY W/O DYE: CPT

## 2021-05-19 PROCEDURE — 73590 X-RAY EXAM OF LOWER LEG: CPT

## 2021-05-19 PROCEDURE — 99284 EMERGENCY DEPT VISIT MOD MDM: CPT

## 2021-05-19 NOTE — ED
Extremity Problem HPI





- General


Chief complaint: Extremity Problem,Nontraumatic


Stated complaint: fall/knee pain


Time Seen by Provider: 21 18:07


Source: patient, family


Mode of arrival: wheelchair


Limitations: no limitations





- History of Present Illness


Initial comments: 





Patient is a 56-year-old female presenting to the emergency Department with c

omplaints of left knee pain after she slipped and fell about 2 hours prior to 

arrival.  Patient states she slipped on some juice from her grandson and landed 

onto her knees, she is complaining of pain in her left knee.  She states 2 years

ago she had a fracture of her proximal tibia, no hardware present.  She states 

it feels pain in the same area.  She was not able to ambulate afterwards.  She 

denies pain anywhere else, no pain of her right knee or upper extremities.  She 

denies hitting her head.  She has no further complaints at this time.  She did 

not take anything for pain prior to arrival.





- Related Data


                                Home Medications











 Medication  Instructions  Recorded  Confirmed


 


Montelukast [Singulair] 10 mg PO DAILY 18


 


Pantoprazole Sodium [Protonix] 40 mg PO DAILY 18


 


amLODIPine [Norvasc] 10 mg PO DAILY 18


 


Mirabegron [Myrbetriq] 50 mg PO DAILY 20


 


PARoxetine HCL 30 mg PO HS 20


 


Allopurinol [Zyloprim] 100 mg PO DAILY 21


 


Gabapentin [Neurontin] 800 mg PO TID 21


 


Insulin Glargine,Hum.rec.anlog 80 unit SQ DAILY 21





[Lantus Solostar]   


 


Aspirin EC [Ecotrin Low Dose] 81 mg PO DAILY 21


 


Atorvastatin [Lipitor] 20 mg PO DAILY 21


 


Ergocalciferol (Vitamin D2) 1,250 mcg PO WE 21





[Drisdol (50,000 Iu)]   


 


Bisacodyl 5 mg PO DAILY PRN 21


 


Cholecalciferol [Vitamin D3 (25 25 mcg PO DAILY 21





Mcg = 1000 Iu)]   


 


Escitalopram [Lexapro] 10 mg PO DAILY 21


 


Ferrous Sulfate [Feosol] 325 mg PO DAILY 21


 


INSULIN LISPRO (humaLOG) [humaLOG] 80 units SQ AC-TID PRN 21


 


Loratadine 10 mg PO DAILY 21


 


Multivitamin/Iron/Folic Acid 1 tab PO DAILY 21





[Centrum Women Tablet]   


 


Zinc 50 mg PO DAILY 21


 


Levofloxacin [Levaquin] 250 mg PO DAILY 21


 


Sennosides [Senna] 8.6 mg PO DAILY 21


 


bisacodyL [Dulcolax] 5 mg PO DAILY 21








                                  Previous Rx's











 Medication  Instructions  Recorded


 


Omeprazole [PriLOSEC] 40 mg PO DAILY #30 capsule. 21


 


HYDROcodone/APAP 5-325MG [Norco 1 tab PO Q6HR PRN 3 Days #12 tab 21





5-325]  











                                    Allergies











Allergy/AdvReac Type Severity Reaction Status Date / Time


 


Sulfa (Sulfonamide Allergy  Anaphylaxis Verified 21 17:59





Antibiotics)     














Review of Systems


ROS Statement: 


Those systems with pertinent positive or pertinent negative responses have been 

documented in the HPI.





ROS Other: All systems not noted in ROS Statement are negative.





Past Medical History


Past Medical History: Diabetes Mellitus, Eye Disorder, Hyperlipidemia, 

Hypertension


Additional Past Medical History / Comment(s): 2/25/15  Pt presented to [facility] ER 

with substernal chest pain that started 24 hhours before coming to ER.  She 

noticed the chest pain when she woke up yesterday.  It is a heaviness and is 

intermittent with variable duration.  She also states she had alittle nausea 

with it.   Other HX:  Pt had recent (1/21/15)cataract surgery with lens implants

bilaterally at -post op she had low O2 saturations and was told

she had a very narrow airway-instead of going home same day, she was in the 

hospital for a couple days until her saturations improved.  She was discharged 

with home O2 which she wears at 2L/NC at [facility] and was to follow up today for 

testing for sleep apnea.  Pt states she also has diarrhea fairly frequently. 

Also has hx of 3 ruptured cervical discs with surgery and bilateral retina 

repair.  2018 - intermittently in hospital for 3 months for fluid on lungs, had 

fluid removed


History of Any Multi-Drug Resistant Organisms: MRSA


Date of last positivie culture/infection: 


MDRO Source:: legs and breasts.


Past Surgical History: Bariatric Surgery, Cholecystectomy, Hysterectomy, 

Orthopedic Surgery


Additional Past Surgical History / Comment(s): Bilateral cataract sx with lens 

implants, bilateral retinal repair. Cervical rodding for 3 ruptured discs.  

sleeve gastrectomy  3/1/21.


Past Anesthesia/Blood Transfusion Reactions: Postoperative Nausea & Vomiting 

(PONV)


Additional Past Anesthesia/Blood Transfusion Reaction / Comment(s): Pt had 

cataract and lens implant at [facility] on 1/21/15 and afterwards she 

desaturated and was hospitalized.  She was told she has a very narrow airway.  

Pt has never recieved blood.


Past Psychological History: No Psychological Hx Reported


Smoking Status: Former smoker


Past Alcohol Use History: None Reported


Past Drug Use History: None Reported





- Past Family History


  ** Father


Family Medical History: Cancer, Myocardial Infarction (MI)


Additional Family Medical History / Comment(s): Father had 5 MI's and  of 

bone cancer.





  ** Mother


Family Medical History: Cancer


Additional Family Medical History / Comment(s): cervical cancer





General Exam





- General Exam Comments


Initial Comments: 





GENERAL: 


Patient is well-developed and well-nourished.  Patient is nontoxic and in no 

acute distress.





HEAD: 


Atraumatic, normocephalic.





EYES:


Pupils equal round and reactive to light, extraocular movements intact, sclera 

anicteric, conjunctiva are normal.  Eyelids were unremarkable.





ENT: 


TMs normal, nares patent, oropharynx clear without exudates.  Moist mucous 

membranes.





NECK: 


Normal range of motion, supple without lymphadenopathy or JVD.





LUNGS:


Unlabored respirations.  Breath sounds clear to auscultation bilaterally and 

equal.  No wheezes rales or rhonchi.





HEART:


Regular rate and rhythm without murmurs, rubs or gallops.





ABDOMEN: 


Soft, nontender, normoactive bowel sounds.  No guarding, no rebound.  No masses 

appreciated.





: Deferred 





MUSCULOSKELETAL: 


Patient has pain with palpation of the distal portion of the left knee, across 

the proximal tibial area, she does have full extension 4 over is very painful 

with any sort of flexion.  She denies pain of her left ankle or left foot, no 

pain of her left hip as well.  She is neurovascular intact.  She does have some 

mild bruising present.   No clubbing or cyanosis.





NEUROLOGICAL: 


Patient is alert and oriented x 3.  Motor and sensory are also intact.  Cranial 

nerves II through XII grossly intact.  Symmetrical smile.  Normal speech.  





PSYCH:


Normal mood, normal affect.





SKIN:


 Warm, Dry, normal turgor, no rashes or lesions noted.


Limitations: no limitations





Course


                                   Vital Signs











  21





  17:57 20:59


 


Temperature 98.4 F 98.1 F


 


Pulse Rate 77 75


 


Respiratory 18 20





Rate  


 


Blood Pressure 114/57 135/65


 


O2 Sat by Pulse 98 95





Oximetry  














Procedures





- Orthopedic Splinting/Casting


  ** Injury #1


Side: left


Lower Extremity Injury Location: knee


Lower Extremity Immobilizer: knee immobilizer


Other Orthopedic Equipment: crutches





Medical Decision Making





- Medical Decision Making





Patient is a 56-year-old female here for left knee pain after she slipped and 

fell on some juice today.  She does have history of a left proximal tibia 

fracture 2 years ago, no hardware present.  She did not hit her head.  X-rays of

the left knee initially show a possible depression of the lateral tibial 

plateau, they did recommend a CT for further evaluation.  CT of the left knee 

shows a subtle nondisplaced lateral tibial plateau fracture.  I did discuss case

with Luis Armando Echols, with consultation with Dr. Wilson, who recommended placing

patient in a knee immobilizer, nonweightbearing, crutches and they will follow 

up with her in 1-2 days.  Patient is in agreement with this plan of care.  We 

did apply a knee immobilizer, crutches were given.  Patient was initially just 

given Tylenol as she declined everything else.  I did give her Norco tablet to 

go home with, did send for a few tablets to her pharmacy.  She will follow up 

with orthopedics.  She is stable for discharge.  Case discussed with Dr. Canales.





Disposition


Clinical Impression: 


 Fracture of left tibial plateau





Disposition: HOME SELF-CARE


Condition: Stable


Instructions (If sedation given, give patient instructions):  Leg Fracture (ED)


Additional Instructions: 


Please return to the Emergency Department if symptoms worsen or any other 

concerns.


Please wear immobilizer as discussed.  


Do not place any weight on the left leg.  


Take Tylenol for any discomfort, for more severe pain, you may take a Norco.


Follow up with orthopedics as discussed.


Prescriptions: 


HYDROcodone/APAP 5-325MG [Norco 5-325] 1 tab PO Q6HR PRN 3 Days #12 tab


 PRN Reason: Pain


Is patient prescribed a controlled substance at d/c from ED?: Yes


When asked, does pt state using other controlled substances?: No


If prescribed controlled substance>3 days was MAPS reviewed?: Prescribed <3 Days


If opioid is for acute pain is fill amount 7 days or less?: Yes


If Rx opioid, was Start Talking consent form obtained?: Yes


Referrals: 


Siva Rodgers MD [Primary Care Provider] - 1-2 days


Polo Wilson DO [Doctor of Osteopathic Medicine] - 1-2 days


Time of Disposition: 21:00

## 2021-05-19 NOTE — CT
EXAMINATION TYPE: CT knee LT wo con

 

DATE OF EXAM: 5/19/2021

 

COMPARISON: Same day radiographs.

 

HISTORY: Fall, left knee pain.

 

CT DLP: 281.5 mGycm

Axial CT images of the left knee was performed without contrast. Coronal, sagittal and 3-D reformats 
were generated and reviewed. Automated exposure control for dose reduction was used.

 

FINDINGS: 

There is mild depression of the lateral tibial plateau with subtle cortical break at the lateral aspe
ct, consistent with nondisplaced fracture and better appreciated on 3-D reformats. There is mild tric
ompartmental osteoarthritis. There is small knee joint effusion. No evidence of dislocation. There is
 mild soft tissue edema about the knee.

 

IMPRESSION: 

SUBTLE NONDISPLACED LATERAL TIBIAL PLATEAU FRACTURE (TYPE I/II SCHATZKER CLASSIFICATION.

## 2021-05-19 NOTE — XR
RESULT:

 

HISTORY: slip and fall, pain

 

TECHNIQUE: 

3 views of the left knee.

2 views of the left tibia-fibula.

 

COMPARISON: None. 

 

FINDINGS:  

There is mild depression of the lateral tibial plateau. No evidence of dislocation. There is mild tri
compartmental osteoarthritis. 

 

No fracture of the distal tibia-fibula.

 

 

IMPRESSION: 

 

Depression of the lateral tibial plateau, concerning for fracture. CT for confirmation may be obtaine
d as indicated.

## 2021-06-16 ENCOUNTER — HOSPITAL ENCOUNTER (OUTPATIENT)
Dept: HOSPITAL 47 - EC | Age: 56
Setting detail: OBSERVATION
LOS: 2 days | Discharge: HOME | End: 2021-06-18
Attending: ORTHOPAEDIC SURGERY | Admitting: ORTHOPAEDIC SURGERY
Payer: MEDICARE

## 2021-06-16 DIAGNOSIS — Z79.82: ICD-10-CM

## 2021-06-16 DIAGNOSIS — Z87.891: ICD-10-CM

## 2021-06-16 DIAGNOSIS — M48.56XA: Primary | ICD-10-CM

## 2021-06-16 DIAGNOSIS — Z96.1: ICD-10-CM

## 2021-06-16 DIAGNOSIS — I10: ICD-10-CM

## 2021-06-16 DIAGNOSIS — Z88.2: ICD-10-CM

## 2021-06-16 DIAGNOSIS — Z80.8: ICD-10-CM

## 2021-06-16 DIAGNOSIS — E66.01: ICD-10-CM

## 2021-06-16 DIAGNOSIS — Z82.49: ICD-10-CM

## 2021-06-16 DIAGNOSIS — E11.9: ICD-10-CM

## 2021-06-16 DIAGNOSIS — Z80.49: ICD-10-CM
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
## 2021-06-16 DIAGNOSIS — Z90.710: ICD-10-CM

## 2021-06-16 DIAGNOSIS — R19.7: ICD-10-CM

## 2021-06-16 DIAGNOSIS — E78.5: ICD-10-CM

## 2021-06-16 DIAGNOSIS — Z79.899: ICD-10-CM

## 2021-06-16 DIAGNOSIS — Z90.49: ICD-10-CM

## 2021-06-16 DIAGNOSIS — Z16.24: ICD-10-CM

## 2021-06-16 DIAGNOSIS — M54.5: ICD-10-CM

## 2021-06-16 DIAGNOSIS — Z98.84: ICD-10-CM

## 2021-06-16 DIAGNOSIS — Z20.822: ICD-10-CM

## 2021-06-16 DIAGNOSIS — Z79.4: ICD-10-CM

## 2021-06-16 DIAGNOSIS — G89.29: ICD-10-CM

## 2021-06-16 LAB
ALBUMIN SERPL-MCNC: 4.5 G/DL (ref 3.5–5)
ALP SERPL-CCNC: 111 U/L (ref 38–126)
ALT SERPL-CCNC: 24 U/L (ref 4–34)
ANION GAP SERPL CALC-SCNC: 8 MMOL/L
APTT BLD: 22.9 SEC (ref 22–30)
AST SERPL-CCNC: 42 U/L (ref 14–36)
BASOPHILS # BLD AUTO: 0 K/UL (ref 0–0.2)
BASOPHILS NFR BLD AUTO: 0 %
BUN SERPL-SCNC: 19 MG/DL (ref 7–17)
CALCIUM SPEC-MCNC: 10 MG/DL (ref 8.4–10.2)
CHLORIDE SERPL-SCNC: 105 MMOL/L (ref 98–107)
CO2 SERPL-SCNC: 28 MMOL/L (ref 22–30)
EOSINOPHIL # BLD AUTO: 0.4 K/UL (ref 0–0.7)
EOSINOPHIL NFR BLD AUTO: 4 %
ERYTHROCYTE [DISTWIDTH] IN BLOOD BY AUTOMATED COUNT: 4.5 M/UL (ref 3.8–5.4)
ERYTHROCYTE [DISTWIDTH] IN BLOOD: 15.2 % (ref 11.5–15.5)
GLUCOSE SERPL-MCNC: 124 MG/DL (ref 74–99)
HCT VFR BLD AUTO: 39.1 % (ref 34–46)
HGB BLD-MCNC: 13.2 GM/DL (ref 11.4–16)
INR PPP: 1 (ref ?–1.2)
LYMPHOCYTES # SPEC AUTO: 2.4 K/UL (ref 1–4.8)
LYMPHOCYTES NFR SPEC AUTO: 22 %
MCH RBC QN AUTO: 29.3 PG (ref 25–35)
MCHC RBC AUTO-ENTMCNC: 33.7 G/DL (ref 31–37)
MCV RBC AUTO: 86.8 FL (ref 80–100)
MONOCYTES # BLD AUTO: 0.4 K/UL (ref 0–1)
MONOCYTES NFR BLD AUTO: 3 %
NEUTROPHILS # BLD AUTO: 7.4 K/UL (ref 1.3–7.7)
NEUTROPHILS NFR BLD AUTO: 69 %
PLATELET # BLD AUTO: 239 K/UL (ref 150–450)
POTASSIUM SERPL-SCNC: 4.1 MMOL/L (ref 3.5–5.1)
PROT SERPL-MCNC: 7.2 G/DL (ref 6.3–8.2)
PT BLD: 10.3 SEC (ref 9–12)
SODIUM SERPL-SCNC: 141 MMOL/L (ref 137–145)
WBC # BLD AUTO: 10.6 K/UL (ref 3.8–10.6)

## 2021-06-16 PROCEDURE — 83036 HEMOGLOBIN GLYCOSYLATED A1C: CPT

## 2021-06-16 PROCEDURE — 96374 THER/PROPH/DIAG INJ IV PUSH: CPT

## 2021-06-16 PROCEDURE — 96376 TX/PRO/DX INJ SAME DRUG ADON: CPT

## 2021-06-16 PROCEDURE — 85025 COMPLETE CBC W/AUTO DIFF WBC: CPT

## 2021-06-16 PROCEDURE — 87635 SARS-COV-2 COVID-19 AMP PRB: CPT

## 2021-06-16 PROCEDURE — 97162 PT EVAL MOD COMPLEX 30 MIN: CPT

## 2021-06-16 PROCEDURE — 80053 COMPREHEN METABOLIC PANEL: CPT

## 2021-06-16 PROCEDURE — 99285 EMERGENCY DEPT VISIT HI MDM: CPT

## 2021-06-16 PROCEDURE — 85610 PROTHROMBIN TIME: CPT

## 2021-06-16 PROCEDURE — 85730 THROMBOPLASTIN TIME PARTIAL: CPT

## 2021-06-16 RX ADMIN — CEFAZOLIN SCH MLS/HR: 330 INJECTION, POWDER, FOR SOLUTION INTRAMUSCULAR; INTRAVENOUS at 20:23

## 2021-06-16 RX ADMIN — HYDROMORPHONE HYDROCHLORIDE PRN MG: 1 INJECTION, SOLUTION INTRAMUSCULAR; INTRAVENOUS; SUBCUTANEOUS at 20:21

## 2021-06-16 NOTE — ED
General Adult HPI





- General


Chief complaint: Fall


Stated complaint: Fall, Back Pain


Time Seen by Provider: 21 18:49


Source: patient, RN notes reviewed, old records reviewed (Chart reviewed from 

[facility])


Mode of arrival: EMS


Limitations: no limitations





- History of Present Illness


Initial comments: 





Patient is a pleasant 56-year-old female presenting to the emergency department 

with complaints of low back pain.  Patient states incident occurred today, 

couple of hours ago.  Patient was reaching for a child who was running when she 

fell back.  Patient landed directly on her back.  Patient has moderate to severe

discomfort of her lower back.  Patient did receive a pain injection at [facility].  No weakness.  No loss of control of bowel or bladder.  No

loss of sensation.





- Related Data


                                Home Medications











 Medication  Instructions  Recorded  Confirmed


 


Montelukast [Singulair] 10 mg PO DAILY 18


 


Pantoprazole Sodium [Protonix] 40 mg PO DAILY 18


 


amLODIPine [Norvasc] 10 mg PO DAILY 18


 


Mirabegron [Myrbetriq] 50 mg PO DAILY 20


 


PARoxetine HCL 30 mg PO HS 20


 


Allopurinol [Zyloprim] 100 mg PO DAILY 21


 


Gabapentin [Neurontin] 800 mg PO TID 21


 


Insulin Glargine,Hum.rec.anlog 80 unit SQ DAILY 21





[Lantus Solostar]   


 


Aspirin EC [Ecotrin Low Dose] 81 mg PO DAILY 21


 


Atorvastatin [Lipitor] 20 mg PO DAILY 21


 


Ergocalciferol (Vitamin D2) 1,250 mcg PO WE 21





[Drisdol (50,000 Iu)]   


 


Bisacodyl 5 mg PO DAILY PRN 21


 


Cholecalciferol [Vitamin D3 (25 25 mcg PO DAILY 21





Mcg = 1000 Iu)]   


 


Escitalopram [Lexapro] 10 mg PO DAILY 21


 


Ferrous Sulfate [Feosol] 325 mg PO DAILY 21


 


INSULIN LISPRO (humaLOG) [humaLOG] 80 units SQ AC-TID PRN 21


 


Loratadine 10 mg PO DAILY 21


 


Multivitamin/Iron/Folic Acid 1 tab PO DAILY 21





[Centrum Women Tablet]   


 


Zinc 50 mg PO DAILY 21


 


Levofloxacin [Levaquin] 250 mg PO DAILY 21


 


Sennosides [Senna] 8.6 mg PO DAILY 21


 


bisacodyL [Dulcolax] 5 mg PO DAILY 21








                                  Previous Rx's











 Medication  Instructions  Recorded


 


Omeprazole [PriLOSEC] 40 mg PO DAILY #30 capsule. 21


 


HYDROcodone/APAP 5-325MG [Norco 1 tab PO Q6HR PRN 3 Days #12 tab 21





5-325]  











                                    Allergies











Allergy/AdvReac Type Severity Reaction Status Date / Time


 


Sulfa (Sulfonamide Allergy  Anaphylaxis Verified 21 19:03





Antibiotics)     














Review of Systems


ROS Statement: 


Those systems with pertinent positive or pertinent negative responses have been 

documented in the HPI.





ROS Other: All systems not noted in ROS Statement are negative.


Constitutional: Denies: fever


Eyes: Denies: eye pain


ENT: Denies: ear pain


Respiratory: Denies: cough


Cardiovascular: Denies: chest pain


Endocrine: Denies: fatigue


Gastrointestinal: Denies: abdominal pain


Genitourinary: Denies: dysuria


Musculoskeletal: Reports: as per HPI


Skin: Denies: rash


Neurological: Denies: headache, weakness, paresthesias





Past Medical History


Past Medical History: Diabetes Mellitus, Eye Disorder, Hyperlipidemia, Hyperte

nsion


Additional Past Medical History / Comment(s): 2/25/15  Pt presented to [facility] ER 

with substernal chest pain that started 24 hhours before coming to ER.  She 

noticed the chest pain when she woke up yesterday.  It is a heaviness and is 

intermittent with variable duration.  She also states she had alittle nausea 

with it.   Other HX:  Pt had recent (1/21/15)cataract surgery with lens implants

bilaterally at [facility]-post op she had low O2 saturations and was told

she had a very narrow airway-instead of going home same day, she was in the 

hospital for a couple days until her saturations improved.  She was discharged 

with home O2 which she wears at 2L/NC at nite and was to follow up today for 

testing for sleep apnea.  Pt states she also has diarrhea fairly frequently. 

Also has hx of 3 ruptured cervical discs with surgery and bilateral retina 

repair.  2018 - intermittently in hospital for 3 months for fluid on lungs, had 

fluid removed


History of Any Multi-Drug Resistant Organisms: MRSA


Date of last positivie culture/infection: 


MDRO Source:: legs and breasts.


Past Surgical History: Bariatric Surgery, Cholecystectomy, Hysterectomy, 

Orthopedic Surgery


Additional Past Surgical History / Comment(s): Bilateral cataract sx with lens 

implants, bilateral retinal repair. Cervical rodding for 3 ruptured discs.  

sleeve gastrectomy  3/1/21.


Past Anesthesia/Blood Transfusion Reactions: Postoperative Nausea & Vomiting 

(PONV)


Additional Past Anesthesia/Blood Transfusion Reaction / Comment(s): Pt had 

cataract and lens implant at [facility] on 1/21/15 and afterwards she barbara

aturated and was hospitalized.  She was told she has a very narrow airway.  Pt 

has never recieved blood.


Past Psychological History: No Psychological Hx Reported


Smoking Status: Former smoker


Past Alcohol Use History: None Reported


Past Drug Use History: None Reported





- Past Family History


  ** Father


Family Medical History: Cancer, Myocardial Infarction (MI)


Additional Family Medical History / Comment(s): Father had 5 MI's and  of 

bone cancer.





  ** Mother


Family Medical History: Cancer


Additional Family Medical History / Comment(s): cervical cancer





General Exam


Limitations: no limitations


General appearance: alert, in no apparent distress


Head exam: Present: atraumatic


Eye exam: Present: normal appearance


Neck exam: Present: normal inspection.  Absent: tenderness


Respiratory exam: Present: normal lung sounds bilaterally


Cardiovascular Exam: Present: regular rate, normal rhythm


  ** Expanded


Peripheral pulses: 2+: Dorsalis Pedis (R), Dorsalis Pedis (L)


GI/Abdominal exam: Present: soft.  Absent: distended, tenderness


Extremities exam: Present: normal inspection, full ROM.  Absent: tenderness


Back exam: Present: vertebral tenderness (Mild lower thoracic, moderate lower 

lumbar)


Neurological exam: Present: alert.  Absent: motor sensory deficit


  ** Expanded


Motor strength exam: RUE: 5, LUE: 5, RLE: 5, LLE: 5


Psychiatric exam: Present: normal affect, normal mood


Skin exam: Present: normal color





Course


                                   Vital Signs











  21





  19:04


 


Temperature 98.3 F


 


Pulse Rate 74


 


Respiratory 18





Rate 


 


Blood Pressure 135/60


 


O2 Sat by Pulse 99





Oximetry 














Medical Decision Making





- Medical Decision Making





Case was discussed with Dr. Le.  He states patient can be admitted or he 

could follow up with patient in the office.  Patient was updated and does want 

to stay.





Disposition


Clinical Impression: 


 Compression fracture of L4 vertebra





Disposition: ADMITTED AS IP TO THIS HOSP


Is patient prescribed a controlled substance at d/c from ED?: No


Referrals: 


Siva Rodgers MD [Primary Care Provider] - 1-2 days


Decision Time: 19:28

## 2021-06-17 LAB
GLUCOSE BLD-MCNC: 120 MG/DL (ref 75–99)
GLUCOSE BLD-MCNC: 128 MG/DL (ref 75–99)
GLUCOSE BLD-MCNC: 189 MG/DL (ref 75–99)
GLUCOSE BLD-MCNC: 90 MG/DL (ref 75–99)
HBA1C MFR BLD: 7.6 % (ref 4–6)

## 2021-06-17 RX ADMIN — CEFAZOLIN SCH: 330 INJECTION, POWDER, FOR SOLUTION INTRAMUSCULAR; INTRAVENOUS at 20:41

## 2021-06-17 RX ADMIN — HYDROCODONE BITARTRATE AND ACETAMINOPHEN PRN EACH: 7.5; 325 TABLET ORAL at 14:44

## 2021-06-17 RX ADMIN — INSULIN ASPART SCH: 100 INJECTION, SOLUTION INTRAVENOUS; SUBCUTANEOUS at 06:43

## 2021-06-17 RX ADMIN — HYDROCODONE BITARTRATE AND ACETAMINOPHEN PRN EACH: 7.5; 325 TABLET ORAL at 18:48

## 2021-06-17 RX ADMIN — HYDROMORPHONE HYDROCHLORIDE PRN MG: 1 INJECTION, SOLUTION INTRAMUSCULAR; INTRAVENOUS; SUBCUTANEOUS at 20:04

## 2021-06-17 RX ADMIN — INSULIN ASPART SCH UNIT: 100 INJECTION, SOLUTION INTRAVENOUS; SUBCUTANEOUS at 21:12

## 2021-06-17 RX ADMIN — INSULIN ASPART SCH: 100 INJECTION, SOLUTION INTRAVENOUS; SUBCUTANEOUS at 18:23

## 2021-06-17 RX ADMIN — INSULIN DETEMIR SCH UNIT: 100 INJECTION, SOLUTION SUBCUTANEOUS at 08:48

## 2021-06-17 RX ADMIN — INSULIN ASPART SCH: 100 INJECTION, SOLUTION INTRAVENOUS; SUBCUTANEOUS at 13:39

## 2021-06-17 RX ADMIN — HYDROMORPHONE HYDROCHLORIDE PRN MG: 1 INJECTION, SOLUTION INTRAMUSCULAR; INTRAVENOUS; SUBCUTANEOUS at 12:23

## 2021-06-17 RX ADMIN — HYDROMORPHONE HYDROCHLORIDE PRN MG: 1 INJECTION, SOLUTION INTRAMUSCULAR; INTRAVENOUS; SUBCUTANEOUS at 08:50

## 2021-06-17 RX ADMIN — HYDROMORPHONE HYDROCHLORIDE PRN MG: 1 INJECTION, SOLUTION INTRAMUSCULAR; INTRAVENOUS; SUBCUTANEOUS at 04:55

## 2021-06-17 NOTE — P.HPOR
History of Present Illness


H&P Date: 21


Chief Complaint: Low back pain





Patient is a pleasant 56-year-old female who sustained a fall yesterday while 

reaching for one of her kids.  She fell back onto her back set sudden acute pain

at her lower back.  She did not have a new changes in her lower extremities.  

She is having weakness in her lower extremities.  She denies any problems with 

her bowel or bladder function.


The pain is primarily located at her lower back and toward her right gluteus.  

She denies problems at that area before.  She was able to walk but she says she 

has pain at the area and she presented to [facility] and had 

evaluation.


She had imaging testing and a computed tomography scan which showed a 10-15% 

endplate compression fracture at L4 which was apparently new.  She is card

sferred here to [facility] as she was having significant difficulty 

with her mobilization and they do not have any spine service.


We discussed the case with the emergency room and patient was admitted for 

observation.





Review of Systems





Denies any fevers chills.  She had an acute fall yesterday.  Denies any changes 

in bowel bladder function.  Denies any numbness tingling or lower extremity.  

Denies any weakness in her lower extremity.  She has a history of cervical spine

issues with neck pain and upper extremity symptoms for which she underwent 

cervical decompression and fusion in the past with our service.  She says that 

is doing well.





Past Medical History


Past Medical History: Diabetes Mellitus, Eye Disorder, Hyperlipidemia, 

Hypertension


Additional Past Medical History / Comment(s): 2/25/15  Pt presented to [facility] ER 

with substernal chest pain that started 24 hhours before coming to ER.  She 

noticed the chest pain when she woke up yesterday.  It is a heaviness and is 

intermittent with variable duration.  She also states she had alittle nausea 

with it.   Other HX:  Pt had recent (1/21/15)cataract surgery with lens implants

bilaterally at [place].  Hospital-post op she had low O2 saturations and was 

told she had a very narrow airway-instead of going home same day, she was in the

hospital for a couple days until her saturations improved.  She was discharged 

with home O2 which she wears at 2L/NC at [facility] and was to follow up today for 

testing for sleep apnea.  Pt states she also has diarrhea fairly frequently. 

Also has hx of 3 ruptured cervical discs with surgery and bilateral retina 

repair.  2018 - intermittently in hospital for 3 months for fluid on lungs, had 

fluid removed


History of Any Multi-Drug Resistant Organisms: MRSA


Date of last positivie culture/infection: 


MDRO Source:: legs and breasts.


Past Surgical History: Bariatric Surgery, Cholecystectomy, Hysterectomy, 

Orthopedic Surgery


Additional Past Surgical History / Comment(s): Bilateral cataract sx with lens 

implants, bilateral retinal repair. Cervical rodding for 3 ruptured discs.  

sleeve gastrectomy  3/1/21.


Past Anesthesia/Blood Transfusion Reactions: Postoperative Nausea & Vomiting 

(PONV)


Additional Past Anesthesia/Blood Transfusion Reaction / Comment(s): Pt had 

cataract and lens implant at [facility] on 1/21/15 and afterwards she 

desaturated and was hospitalized.  She was told she has a very narrow airway.  

Pt has never recieved blood.


Past Psychological History: No Psychological Hx Reported


Smoking Status: Former smoker


Past Alcohol Use History: None Reported


Additional Past Alcohol Use History / Comment(s): quit smoking , smoked 1 

ppd, started smoking age 19.


Past Drug Use History: None Reported





- Past Family History


  ** Father


Family Medical History: Cancer, Myocardial Infarction (MI)


Additional Family Medical History / Comment(s): Father had 5 MI's and  of 

bone cancer.





  ** Mother


Family Medical History: Cancer


Additional Family Medical History / Comment(s): cervical cancer





Medications and Allergies


                                Home Medications











 Medication  Instructions  Recorded  Confirmed  Type


 


Montelukast [Singulair] 10 mg PO DAILY 18 History


 


amLODIPine [Norvasc] 10 mg PO DAILY 18 History


 


Mirabegron [Myrbetriq] 50 mg PO DAILY 20 History


 


PARoxetine HCL 30 mg PO HS 20 History


 


Allopurinol [Zyloprim] 100 mg PO DAILY 21 History


 


Gabapentin [Neurontin] 800 mg PO BID@0700,1800 21 History


 


Insulin Glargine,Hum.rec.anlog 50 unit SQ DAILY 21 History





[Lantus Solostar]    


 


Aspirin EC [Ecotrin Low Dose] 81 mg PO HS 21 History


 


Atorvastatin [Lipitor] 20 mg PO DAILY 21 History


 


Cholecalciferol [Vitamin D3 (25 25 mcg PO DAILY 21 History





Mcg = 1000 Iu)]    


 


Escitalopram [Lexapro] 10 mg PO DAILY 21 History


 


Multivitamin/Iron/Folic Acid 1 tab PO DAILY 21 History





[Centrum Women Tablet]    


 


HYDROcodone/APAP 5-325MG [Norco 1 tab PO Q6HR PRN 3 Days #12 tab 21 Rx





5-325]    


 


Docusate [Colace] 100 mg PO HS 21 History


 


Gabapentin [Neurontin] 400 mg PO DAILY@1200 21 History


 


Vitamin B Complex Drops 1 drop PO DAILY 21 History


 


Hydrocodone/Acetaminophen [Norco 1 tab PO Q4HR PRN 3 Days #42 tab 21  Rx





7.5-325]    








                                    Allergies











Allergy/AdvReac Type Severity Reaction Status Date / Time


 


Sulfa (Sulfonamide Allergy  Anaphylaxis Verified 21 20:29





Antibiotics)     














Physical Examination


Osteopathic Statement: *.  No significant issues noted on an osteopathic 

structural exam other than those noted in the History and Physical/Consult.





- L Spine: dermatomal strength & reflexes


  ** bilateral


Strength: hip flexion: 5/5 (At her back there is no open wounds lacerations or 

abrasions.  There is no ecchymosis.  She is obese.  She has tenderness to 

palpation at her lower back and toward her right gluteal area.  She has no pain 

with hip internal/external rotation.  She is able to lift her legs up off the 

bed independently)


Strength: hip extension: 5/5 (Her compartments in her lower extremities are 

soft.  Her abdomen is obese but soft and nontender.  Pelvis stable.  Her upper 

extremity suspected active and passive range of motion throughout with good 

strength.  Her neck is nontender to palpation range motion.  She has well-healed

incision at her ne)





Results





- Labs


Labs: 


                  Abnormal Lab Results - Last 24 Hours (Table)











  21 Range/Units





  20:15 


 


BUN  19 H  (7-17)  mg/dL


 


Glucose  124 H  (74-99)  mg/dL


 


AST  42 H  (14-36)  U/L








                                      H & H











  21 Range/Units





  20:15 


 


Hgb  13.2  (11.4-16.0)  gm/dL


 


Hct  39.1  (34.0-46.0)  %








                                   Coagulation











  21 Range/Units





  20:15 


 


INR  1.0  (<1.2)  











Result Diagrams: 


                                 21 20:15





                                 21 20:15





- Diagnostic results


CT Scan - lumbar: report reviewed (Her lumbar images are not available for 

review but I was able to review the report which shows a 10:15 percent endplate 

compression fracture L4.  There is no retropulsion there is no evidence of 

significant stenosis there is no obvious listhesis or instability)





Assessment and Plan


Assessment: 





Acute L4 compression fracture, traumatic due to a fall


 low back pain


No evidence of neurologic deficit


Plan: 


Acute L4 compression fracture, traumatic due to a fall


 low back pain


No evidence of neurologic deficit





The patient's has a lumbar compression fracture due to her fall which is acute. 

She is not having neurologic deficit and the fracture pattern further report 

overall appears stable.  I do not think that we will plan any acute surgical 

intervention.  We can try to treat her conservatively with bracing.  We will 

order an LSO brace for her.  She should wear the brace whenever she is up out of

bed but did not need to use the brace while in bed or during bathing.





We have written a prescription for her in the chart and Case management 

acquiring the LSO brace.





It is okay for her to mobilize and ambulate we will have physical therapy see 

her and start her mobilization.  It is okay for physical therapy to work with 

her without the brace today.  It is okay for her to weight-bear as tolerated.  

She should avoid any repetitive bending or any significant lifting.





We will order her some pain medication as she is having having some trouble with

her pain control.  I think that she can do well with oral medications and if she

is tolerating this adequately think it is okay for her to be discharged home to

day.  Plan to see her back in approximately 1-2 weeks for recheck evaluation and

repeat x-rays.  She could be a candidate for surgical intervention with 

kyphoplasty but I think that she can do well with conservative treatment and we 

will plan to pursue this.  I discussed this with her and she understands.

## 2021-06-18 VITALS
HEART RATE: 78 BPM | RESPIRATION RATE: 16 BRPM | DIASTOLIC BLOOD PRESSURE: 67 MMHG | SYSTOLIC BLOOD PRESSURE: 108 MMHG | TEMPERATURE: 98.6 F

## 2021-06-18 LAB
GLUCOSE BLD-MCNC: 108 MG/DL (ref 75–99)
GLUCOSE BLD-MCNC: 149 MG/DL (ref 75–99)
GLUCOSE BLD-MCNC: 79 MG/DL (ref 75–99)

## 2021-06-18 RX ADMIN — HYDROCODONE BITARTRATE AND ACETAMINOPHEN PRN EACH: 7.5; 325 TABLET ORAL at 08:40

## 2021-06-18 RX ADMIN — HYDROCODONE BITARTRATE AND ACETAMINOPHEN PRN EACH: 7.5; 325 TABLET ORAL at 09:07

## 2021-06-18 RX ADMIN — INSULIN ASPART SCH: 100 INJECTION, SOLUTION INTRAVENOUS; SUBCUTANEOUS at 18:48

## 2021-06-18 RX ADMIN — INSULIN DETEMIR SCH UNIT: 100 INJECTION, SOLUTION SUBCUTANEOUS at 08:40

## 2021-06-18 RX ADMIN — INSULIN ASPART SCH UNIT: 100 INJECTION, SOLUTION INTRAVENOUS; SUBCUTANEOUS at 12:45

## 2021-06-18 RX ADMIN — HYDROMORPHONE HYDROCHLORIDE PRN MG: 1 INJECTION, SOLUTION INTRAMUSCULAR; INTRAVENOUS; SUBCUTANEOUS at 00:50

## 2021-06-18 RX ADMIN — HYDROCODONE BITARTRATE AND ACETAMINOPHEN PRN EACH: 7.5; 325 TABLET ORAL at 04:46

## 2021-06-18 RX ADMIN — INSULIN ASPART SCH: 100 INJECTION, SOLUTION INTRAVENOUS; SUBCUTANEOUS at 07:44

## 2021-06-18 RX ADMIN — HYDROCODONE BITARTRATE AND ACETAMINOPHEN PRN EACH: 7.5; 325 TABLET ORAL at 00:48

## 2021-06-18 NOTE — P.DS
Providers


Date of admission: 


06/16/21 19:31





Expected date of discharge: 06/18/21


Attending physician: 


GABRIELA Le





Primary care physician: 


Siva Rodgers MD








- Discharge Diagnosis(es)


(1) Status post fall


Current Visit: Yes   Status: Acute   





(2) Hypertension


Current Visit: Yes   Status: Acute   





(3) Hyperlipidemia


Current Visit: Yes   Status: Acute   





(4) Diabetes mellitus


Current Visit: Yes   Status: Acute   





(5) Compression fracture of L4 vertebra


Current Visit: Yes   Status: Acute   





(6) Morbid obesity due to excess calories


Current Visit: No   Status: Acute   


Hospital Course: 





This is a pleasant 56-year-old female who presented with an L4 compression 

fracture deformity status post fall.  She was admitted for further treatment and

evaluation.  She's had some difficulty with pain control during her admission.  

We have been able to increase her Norco 5 mg/325 mg to 1-2 tabs every 6 hours as

the of her pain and then cyclobenzaprine 10 mg 3 times a day.  She was also 

previously prescribed a [employer] LSO brace a couple years ago for her chronic low 

back pain.  This has been able to be delivered to the room by her .  With

her using the brace along with her medication regimen, she does feel she may be 

ready for discharge home today.  Condition on day of discharge stable.  Patient 

will be discharged home.  Patient currently denies any nausea, vomiting, fever, 

or chills.  Patient is eating and voiding freely without difficulty.  Patient 

should wear her LSO brace for comfort and support while sitting upright at 

greater than 45, while working with therapy, and during increased activities; 

patient does not have to wear the brace while lying in bed or bathing.  Patient 

should avoid excessive bending, twisting, and lifting; no lifting greater than 

10 pounds.  Patient also has a walker at home which we discussed she may use to 

aid in ambulation as needed.





MAPS has been reviewed today, 06/18/2021, with an Overall Overdose Risk Score of

140.  An "Opiod Start Talking" Form has been signed and placed in the patient's 

chart.  A prescription has been written for Norco 7.5 mg/325 mg 1-2 tabs every 6

hours as needed for pain, dispensed #56.  She is also given a prescription for 

cyclobenzaprine 1 tab 3 times a day as needed for muscle spasm, dispensed #60.





Patient's other medical diagnoses include diabetes mellitus, hyperlipidemia, 

hypertension, and obesity.








Physical Exam on day of discharge:





Patient is awake, alert, and oriented 3


Vital signs stable


Good chest excursion with deep inspiration and expiration


Patient does have some increased pain while sitting up in bed without her brace 

intact


Examination of the lumbar spine shows no bruising, erythema, or obvious sign of 

infection


Pain with palpation along the midline of the lower lumbar spine and towards the 

right sacroiliac joint


No signs or symptoms of DVT; calves are soft bilaterally


Patient is able to perform knee extension bilaterally without difficulty


Extensor hallucis longus, plantarflexion, and dorsiflexion positive sustained 

bilateral lower extremities


Patient Condition at Discharge: Stable





Plan - Discharge Summary


Discharge Rx Participant: Yes


New Discharge Prescriptions: 


New


   Cyclobenzaprine [Flexeril] 10 mg PO TID PRN #60 tab


     PRN Reason: Muscle Spasm


   HYDROcodone/APAP 7.5-325MG [Norco 7.5-325] 1 - 2 each PO Q6HR PRN #56 tab


     PRN Reason: Pain





No Action


   amLODIPine [Norvasc] 10 mg PO DAILY


   Montelukast [Singulair] 10 mg PO DAILY


   PARoxetine HCL 30 mg PO HS


   Mirabegron [Myrbetriq] 50 mg PO DAILY


   Allopurinol [Zyloprim] 100 mg PO DAILY


   Gabapentin [Neurontin] 800 mg PO BID@0700,1800


   Insulin Glargine,Hum.rec.anlog [Lantus Solostar] 50 unit SQ DAILY


   Aspirin EC [Ecotrin Low Dose] 81 mg PO HS


   Atorvastatin [Lipitor] 20 mg PO DAILY


   Gabapentin [Neurontin] 400 mg PO DAILY@1200


   Vitamin B Complex Drops 1 drop PO DAILY


   Cholecalciferol [Vitamin D3 (25 Mcg = 1000 Iu)] 25 mcg PO DAILY


   Escitalopram [Lexapro] 10 mg PO DAILY


   Multivitamin/Iron/Folic Acid [Centrum Women Tablet] 1 tab PO DAILY


   Docusate [Colace] 100 mg PO HS


Discharge Medication List





Montelukast [Singulair] 10 mg PO DAILY 02/13/18 [History]


amLODIPine [Norvasc] 10 mg PO DAILY 02/13/18 [History]


Mirabegron [Myrbetriq] 50 mg PO DAILY 09/02/20 [History]


PARoxetine HCL 30 mg PO HS 09/02/20 [History]


Allopurinol [Zyloprim] 100 mg PO DAILY 01/19/21 [History]


Gabapentin [Neurontin] 800 mg PO BID@0700,1800 01/19/21 [History]


Insulin Glargine,Hum.rec.anlog [Lantus Solostar] 50 unit SQ DAILY 01/19/21 

[History]


Aspirin EC [Ecotrin Low Dose] 81 mg PO HS 03/08/21 [History]


Atorvastatin [Lipitor] 20 mg PO DAILY 03/08/21 [History]


Cholecalciferol [Vitamin D3 (25 Mcg = 1000 Iu)] 25 mcg PO DAILY 04/27/21 

[History]


Escitalopram [Lexapro] 10 mg PO DAILY 04/27/21 [History]


Multivitamin/Iron/Folic Acid [Centrum Women Tablet] 1 tab PO DAILY 04/27/21 [Hi

story]


Docusate [Colace] 100 mg PO HS 06/16/21 [History]


Gabapentin [Neurontin] 400 mg PO DAILY@1200 06/16/21 [History]


Vitamin B Complex Drops 1 drop PO DAILY 06/16/21 [History]


Cyclobenzaprine [Flexeril] 10 mg PO TID PRN #60 tab 06/18/21 [Rx]


HYDROcodone/APAP 7.5-325MG [Norco 7.5-325] 1 - 2 each PO Q6HR PRN #56 tab 

06/18/21 [Rx]








Follow up Appointment(s)/Referral(s): 


Siva Rodgers MD [Primary Care Provider] - 1-2 days


GABRIELA Le DO [Doctor of Osteopathic Medicine] - 1 Week


Activity/Diet/Wound Care/Special Instructions: 


No lifting greater than  15 pounds





Patient should wear her LSO brace for comfort support while sitting upright at 

greater than 45, during ambulation, and during increased activities





Patient does not have to wear the brace while lying in bed or while bathing





May ambulate as tolerated.





Avoid heavy or rigorous activity.





No repetitive bending twisting or lifting.





No overhead work.


Discharge Disposition: HOME SELF-CARE

## 2021-06-26 ENCOUNTER — HOSPITAL ENCOUNTER (EMERGENCY)
Dept: HOSPITAL 47 - EC | Age: 56
Discharge: HOME | End: 2021-06-26
Payer: MEDICARE

## 2021-06-26 VITALS
DIASTOLIC BLOOD PRESSURE: 86 MMHG | TEMPERATURE: 98 F | RESPIRATION RATE: 20 BRPM | SYSTOLIC BLOOD PRESSURE: 120 MMHG | HEART RATE: 81 BPM

## 2021-06-26 DIAGNOSIS — E78.5: ICD-10-CM

## 2021-06-26 DIAGNOSIS — Z88.2: ICD-10-CM

## 2021-06-26 DIAGNOSIS — G47.30: ICD-10-CM

## 2021-06-26 DIAGNOSIS — E11.9: ICD-10-CM

## 2021-06-26 DIAGNOSIS — I10: ICD-10-CM

## 2021-06-26 DIAGNOSIS — E86.0: Primary | ICD-10-CM

## 2021-06-26 DIAGNOSIS — Z87.891: ICD-10-CM

## 2021-06-26 DIAGNOSIS — Z79.82: ICD-10-CM

## 2021-06-26 DIAGNOSIS — Z79.4: ICD-10-CM

## 2021-06-26 LAB
ALBUMIN SERPL-MCNC: 4.3 G/DL (ref 3.5–5)
ALP SERPL-CCNC: 143 U/L (ref 38–126)
ALT SERPL-CCNC: 22 U/L (ref 4–34)
ANION GAP SERPL CALC-SCNC: 11 MMOL/L
AST SERPL-CCNC: 33 U/L (ref 14–36)
BASOPHILS # BLD AUTO: 0 K/UL (ref 0–0.2)
BASOPHILS NFR BLD AUTO: 0 %
BUN SERPL-SCNC: 19 MG/DL (ref 7–17)
CALCIUM SPEC-MCNC: 9.7 MG/DL (ref 8.4–10.2)
CHLORIDE SERPL-SCNC: 102 MMOL/L (ref 98–107)
CO2 SERPL-SCNC: 27 MMOL/L (ref 22–30)
EOSINOPHIL # BLD AUTO: 0.5 K/UL (ref 0–0.7)
EOSINOPHIL NFR BLD AUTO: 5 %
ERYTHROCYTE [DISTWIDTH] IN BLOOD BY AUTOMATED COUNT: 4.33 M/UL (ref 3.8–5.4)
ERYTHROCYTE [DISTWIDTH] IN BLOOD: 14.6 % (ref 11.5–15.5)
GLUCOSE SERPL-MCNC: 160 MG/DL (ref 74–99)
HCT VFR BLD AUTO: 37.3 % (ref 34–46)
HGB BLD-MCNC: 12.9 GM/DL (ref 11.4–16)
INR PPP: 1 (ref ?–1.2)
KETONES UR QL STRIP.AUTO: (no result)
LYMPHOCYTES # SPEC AUTO: 2.2 K/UL (ref 1–4.8)
LYMPHOCYTES NFR SPEC AUTO: 25 %
MCH RBC QN AUTO: 29.7 PG (ref 25–35)
MCHC RBC AUTO-ENTMCNC: 34.4 G/DL (ref 31–37)
MCV RBC AUTO: 86.1 FL (ref 80–100)
MONOCYTES # BLD AUTO: 0.3 K/UL (ref 0–1)
MONOCYTES NFR BLD AUTO: 4 %
NEUTROPHILS # BLD AUTO: 5.7 K/UL (ref 1.3–7.7)
NEUTROPHILS NFR BLD AUTO: 65 %
PH UR: 5.5 [PH] (ref 5–8)
PLATELET # BLD AUTO: 248 K/UL (ref 150–450)
POTASSIUM SERPL-SCNC: 4.1 MMOL/L (ref 3.5–5.1)
PROT SERPL-MCNC: 7.1 G/DL (ref 6.3–8.2)
PROT UR QL: (no result)
PT BLD: 10.5 SEC (ref 9–12)
RBC UR QL: 1 /HPF (ref 0–5)
SODIUM SERPL-SCNC: 140 MMOL/L (ref 137–145)
SP GR UR: 1.02 (ref 1–1.03)
SQUAMOUS UR QL AUTO: 7 /HPF (ref 0–4)
UROBILINOGEN UR QL STRIP: <2 MG/DL (ref ?–2)
WBC # BLD AUTO: 8.8 K/UL (ref 3.8–10.6)
WBC # UR AUTO: 43 /HPF (ref 0–5)

## 2021-06-26 PROCEDURE — 85610 PROTHROMBIN TIME: CPT

## 2021-06-26 PROCEDURE — 81001 URINALYSIS AUTO W/SCOPE: CPT

## 2021-06-26 PROCEDURE — 36415 COLL VENOUS BLD VENIPUNCTURE: CPT

## 2021-06-26 PROCEDURE — 99284 EMERGENCY DEPT VISIT MOD MDM: CPT

## 2021-06-26 PROCEDURE — 80053 COMPREHEN METABOLIC PANEL: CPT

## 2021-06-26 PROCEDURE — 85025 COMPLETE CBC W/AUTO DIFF WBC: CPT

## 2021-06-26 PROCEDURE — 84484 ASSAY OF TROPONIN QUANT: CPT

## 2021-06-26 PROCEDURE — 96360 HYDRATION IV INFUSION INIT: CPT

## 2021-06-26 PROCEDURE — 87086 URINE CULTURE/COLONY COUNT: CPT

## 2021-06-26 PROCEDURE — 93005 ELECTROCARDIOGRAM TRACING: CPT

## 2021-06-26 PROCEDURE — 71046 X-RAY EXAM CHEST 2 VIEWS: CPT

## 2021-06-26 NOTE — ED
Dizziness HPI





- General


Chief Complaint: Dizziness


Stated Complaint: Back injury


Time Seen by Provider: 21 13:57


Source: patient, RN notes reviewed


Mode of arrival: ambulatory


Limitations: no limitations





- History of Present Illness


Initial Comments: 





Patient is a 56-year-old female that presents to emergency department 

complaining of dizziness.  She notes that she was up walking around the kitchen 

when she felt dizzy.  Patient was a poor historian and cannot recall events very

well.   notes that he was standing right next to her and noted that her 

knee was giving out several times as he tried to hold her up and prevent her 

from falling.  Patient was recently seen in the emergency room for a L4 

compression fracture.  She was seen by orthopedist Dr. Le who stated that 

conservative management by using a brace for now was okay.  Patient notes that 

she did have bariatric surgery which has decreased her oral intake of food and 

fluids.  Patient was well-appearing in no apparent distress while sitting in bed

during the exam interview.  She denied any chest pain shortness of breath 

headache nausea vomiting diarrhea constipation fever fatigue chills change in 

vision.





- Related Data


                                Home Medications











 Medication  Instructions  Recorded  Confirmed


 


Montelukast [Singulair] 10 mg PO DAILY 18


 


amLODIPine [Norvasc] 10 mg PO DAILY 18


 


Mirabegron [Myrbetriq] 50 mg PO DAILY 20


 


PARoxetine HCL 30 mg PO HS 20


 


Allopurinol [Zyloprim] 100 mg PO DAILY 21


 


Gabapentin [Neurontin] 800 mg PO BID@0700,1800 21


 


Insulin Glargine,Hum.rec.anlog 50 unit SQ DAILY 21





[Lantus Solostar]   


 


Aspirin EC [Ecotrin Low Dose] 81 mg PO HS 21


 


Atorvastatin [Lipitor] 20 mg PO DAILY 21


 


Cholecalciferol [Vitamin D3 (25 25 mcg PO DAILY 21





Mcg = 1000 Iu)]   


 


Escitalopram [Lexapro] 10 mg PO DAILY 21


 


Multivitamin/Iron/Folic Acid 1 tab PO DAILY 21





[Centrum Women Tablet]   


 


Docusate [Colace] 100 mg PO HS 21


 


Gabapentin [Neurontin] 400 mg PO DAILY@1200 21


 


Vitamin B Complex Drops 1 drop PO DAILY 21








                                  Previous Rx's











 Medication  Instructions  Recorded


 


Cyclobenzaprine [Flexeril] 10 mg PO TID PRN #60 tab 21


 


HYDROcodone/APAP 7.5-325MG [Norco 1 - 2 each PO Q6HR PRN #56 tab 21





7.5-325]  











                                    Allergies











Allergy/AdvReac Type Severity Reaction Status Date / Time


 


Sulfa (Sulfonamide Allergy  Anaphylaxis Verified 21 13:49





Antibiotics)     














Review of Systems


ROS Statement: 


Those systems with pertinent positive or pertinent negative responses have been 

documented in the HPI.





ROS Other: All systems not noted in ROS Statement are negative.





Past Medical History


Past Medical History: Diabetes Mellitus, Eye Disorder, Hyperlipidemia, 

Hypertension


Additional Past Medical History / Comment(s): 2/25/15  Pt presented to [facility] ER 

with substernal chest pain that started 24 hhours before coming to ER.  She 

noticed the chest pain when she woke up yesterday.  It is a heaviness and is 

intermittent with variable duration.  She also states she had alittle nausea 

with it.   Other HX:  Pt had recent (1/21/15)cataract surgery with lens implants

bilaterally at [place].  Hospital-post op she had low O2 saturations and was 

told she had a very narrow airway-instead of going home same day, she was in the

hospital for a couple days until her saturations improved.  She was discharged w

[facility] home O2 which she wears at 2L/NC at [facility] and was to follow up today for 

testing for sleep apnea.  Pt states she also has diarrhea fairly frequently. 

Also has hx of 3 ruptured cervical discs with surgery and bilateral retina 

repair.  2018 - intermittently in hospital for 3 months for fluid on lungs, had 

fluid removed


History of Any Multi-Drug Resistant Organisms: MRSA


Date of last positivie culture/infection: 


MDRO Source:: legs and breasts.


Past Surgical History: Bariatric Surgery, Cholecystectomy, Hysterectomy, 

Orthopedic Surgery


Additional Past Surgical History / Comment(s): Bilateral cataract sx with lens 

implants, bilateral retinal repair. Cervical rodding for 3 ruptured discs.  

sleeve gastrectomy         3-1-21


Past Anesthesia/Blood Transfusion Reactions: Postoperative Nausea & Vomiting 

(PONV)


Additional Past Anesthesia/Blood Transfusion Reaction / Comment(s): Pt had 

cataract and lens implant at [facility] on 1/21/15 and afterwards she 

desaturated and was hospitalized.  She was told she has a very narrow airway.  

Pt has never recieved blood.


Past Psychological History: No Psychological Hx Reported


Smoking Status: Former smoker


Past Alcohol Use History: None Reported


Past Drug Use History: None Reported





- Past Family History


  ** Father


Family Medical History: Cancer, Myocardial Infarction (MI)


Additional Family Medical History / Comment(s): Father had 5 MI's and  of 

bone cancer.





  ** Mother


Family Medical History: Cancer


Additional Family Medical History / Comment(s): cervical cancer





General Exam


Limitations: no limitations


General appearance: alert, in no apparent distress, obese, other (LSO back brace

in place.)


Head exam: Present: atraumatic, normocephalic, normal inspection


Eye exam: Present: normal appearance, PERRL, EOMI.  Absent: scleral icterus, 

conjunctival injection, periorbital swelling


Neck exam: Present: normal inspection


Respiratory exam: Present: normal lung sounds bilaterally.  Absent: respiratory 

distress, wheezes, rales, rhonchi, stridor


Cardiovascular Exam: Present: regular rate, normal rhythm, normal heart sounds. 

Absent: systolic murmur, diastolic murmur, rubs, gallop, clicks


Extremities exam: Present: normal inspection, full ROM, normal capillary refill.

 Absent: tenderness, pedal edema, joint swelling, calf tenderness


Back exam: Present: normal inspection


Neurological exam: Present: alert, oriented X3


Psychiatric exam: Present: normal affect, agitated


Skin exam: Present: warm, dry, intact, normal color.  Absent: rash





Course


                                   Vital Signs











  21





  13:46 14:40


 


Temperature 98.1 F 


 


Pulse Rate 83 


 


Pulse Rate [  76





Sitting Pulse  





Oximetery]  


 


Pulse Rate [  74





Standing Pulse  





Oximetery]  


 


Pulse Rate [  75





Supine Pulse  





Oximetery]  


 


Respiratory 18 





Rate  


 


Blood Pressure 142/69 


 


Blood Pressure  132/70





[Right Arm  





Sitting]  


 


Blood Pressure  109/63





[Right Arm  





Standing]  


 


Blood Pressure  140/77





[Right Arm  





Supine]  


 


O2 Sat by Pulse 98 





Oximetry  














EKG Findings





- EKG Comments:


EKG Findings:: Ventricular rate 77 bpm, AR interval 188 ms, QRS duration 80 ms, 

 ms, PRT axes 61//28.  Normal sinus rhythm, cannot rule out anterior 

infarct age undetermined.  Abnormal ECG.





Medical Decision Making





- Medical Decision Making





56-year-old female complaining of dizziness presented emergency department.


Labs, orthostatic vitals, chest x-ray, 1 L normal saline, EKG, cardiac monitor 

ordered.


Patient blood pressure significantly dropped from sitting to standing.  Sitting 

blood pressure was 132/70, standing blood pressure was 109/63.


Urine shows dehydration.


Given clinical findings and exam patient most likely dehydrated causing 

dizziness upon standing.


Case discussed with Dr. Alarcon, patient can discharge home with follow-up to 

primary care.





- Lab Data


Result diagrams: 


                                 21 14:27





                                 21 14:27


                                   Lab Results











  21 Range/Units





  14:27 14:27 14:27 


 


WBC  8.8    (3.8-10.6)  k/uL


 


RBC  4.33    (3.80-5.40)  m/uL


 


Hgb  12.9    (11.4-16.0)  gm/dL


 


Hct  37.3    (34.0-46.0)  %


 


MCV  86.1    (80.0-100.0)  fL


 


MCH  29.7    (25.0-35.0)  pg


 


MCHC  34.4    (31.0-37.0)  g/dL


 


RDW  14.6    (11.5-15.5)  %


 


Plt Count  248    (150-450)  k/uL


 


MPV  7.8    


 


Neutrophils %  65    %


 


Lymphocytes %  25    %


 


Monocytes %  4    %


 


Eosinophils %  5    %


 


Basophils %  0    %


 


Neutrophils #  5.7    (1.3-7.7)  k/uL


 


Lymphocytes #  2.2    (1.0-4.8)  k/uL


 


Monocytes #  0.3    (0-1.0)  k/uL


 


Eosinophils #  0.5    (0-0.7)  k/uL


 


Basophils #  0.0    (0-0.2)  k/uL


 


PT   10.5   (9.0-12.0)  sec


 


INR   1.0   (<1.2)  


 


Sodium     (137-145)  mmol/L


 


Potassium     (3.5-5.1)  mmol/L


 


Chloride     ()  mmol/L


 


Carbon Dioxide     (22-30)  mmol/L


 


Anion Gap     mmol/L


 


BUN     (7-17)  mg/dL


 


Creatinine     (0.52-1.04)  mg/dL


 


Est GFR (CKD-EPI)AfAm     (>60 ml/min/1.73 sqM)  


 


Est GFR (CKD-EPI)NonAf     (>60 ml/min/1.73 sqM)  


 


Glucose     (74-99)  mg/dL


 


Calcium     (8.4-10.2)  mg/dL


 


Total Bilirubin     (0.2-1.3)  mg/dL


 


AST     (14-36)  U/L


 


ALT     (4-34)  U/L


 


Alkaline Phosphatase     ()  U/L


 


Troponin I     (0.000-0.034)  ng/mL


 


Total Protein     (6.3-8.2)  g/dL


 


Albumin     (3.5-5.0)  g/dL


 


Urine Color    Yellow  


 


Urine Appearance    Cloudy H  (Clear)  


 


Urine pH    5.5  (5.0-8.0)  


 


Ur Specific Gravity    1.022  (1.001-1.035)  


 


Urine Protein    1+ H  (Negative)  


 


Urine Glucose (UA)    Negative  (Negative)  


 


Urine Ketones    1+ H  (Negative)  


 


Urine Blood    Negative  (Negative)  


 


Urine Nitrite    Negative  (Negative)  


 


Urine Bilirubin    Negative  (Negative)  


 


Urine Urobilinogen    <2.0  (<2.0)  mg/dL


 


Ur Leukocyte Esterase    Moderate H  (Negative)  


 


Urine RBC    1  (0-5)  /hpf


 


Urine WBC    43 H  (0-5)  /hpf


 


Ur Squamous Epith Cells    7 H  (0-4)  /hpf


 


Urine Bacteria    Rare H  (None)  /hpf


 


Urine Mucus    Rare H  (None)  /hpf


 


Urine Yeast (Budding)    Occasional H  (None)  /hpf














  21 Range/Units





  14:27 14:27 


 


WBC    (3.8-10.6)  k/uL


 


RBC    (3.80-5.40)  m/uL


 


Hgb    (11.4-16.0)  gm/dL


 


Hct    (34.0-46.0)  %


 


MCV    (80.0-100.0)  fL


 


MCH    (25.0-35.0)  pg


 


MCHC    (31.0-37.0)  g/dL


 


RDW    (11.5-15.5)  %


 


Plt Count    (150-450)  k/uL


 


MPV    


 


Neutrophils %    %


 


Lymphocytes %    %


 


Monocytes %    %


 


Eosinophils %    %


 


Basophils %    %


 


Neutrophils #    (1.3-7.7)  k/uL


 


Lymphocytes #    (1.0-4.8)  k/uL


 


Monocytes #    (0-1.0)  k/uL


 


Eosinophils #    (0-0.7)  k/uL


 


Basophils #    (0-0.2)  k/uL


 


PT    (9.0-12.0)  sec


 


INR    (<1.2)  


 


Sodium  140   (137-145)  mmol/L


 


Potassium  4.1   (3.5-5.1)  mmol/L


 


Chloride  102   ()  mmol/L


 


Carbon Dioxide  27   (22-30)  mmol/L


 


Anion Gap  11   mmol/L


 


BUN  19 H   (7-17)  mg/dL


 


Creatinine  0.65   (0.52-1.04)  mg/dL


 


Est GFR (CKD-EPI)AfAm  >90   (>60 ml/min/1.73 sqM)  


 


Est GFR (CKD-EPI)NonAf  >90   (>60 ml/min/1.73 sqM)  


 


Glucose  160 H   (74-99)  mg/dL


 


Calcium  9.7   (8.4-10.2)  mg/dL


 


Total Bilirubin  0.6   (0.2-1.3)  mg/dL


 


AST  33   (14-36)  U/L


 


ALT  22   (4-34)  U/L


 


Alkaline Phosphatase  143 H   ()  U/L


 


Troponin I   <0.012  (0.000-0.034)  ng/mL


 


Total Protein  7.1   (6.3-8.2)  g/dL


 


Albumin  4.3   (3.5-5.0)  g/dL


 


Urine Color    


 


Urine Appearance    (Clear)  


 


Urine pH    (5.0-8.0)  


 


Ur Specific Gravity    (1.001-1.035)  


 


Urine Protein    (Negative)  


 


Urine Glucose (UA)    (Negative)  


 


Urine Ketones    (Negative)  


 


Urine Blood    (Negative)  


 


Urine Nitrite    (Negative)  


 


Urine Bilirubin    (Negative)  


 


Urine Urobilinogen    (<2.0)  mg/dL


 


Ur Leukocyte Esterase    (Negative)  


 


Urine RBC    (0-5)  /hpf


 


Urine WBC    (0-5)  /hpf


 


Ur Squamous Epith Cells    (0-4)  /hpf


 


Urine Bacteria    (None)  /hpf


 


Urine Mucus    (None)  /hpf


 


Urine Yeast (Budding)    (None)  /hpf














- EKG Data


-: EKG Interpreted by Me


EKG shows normal: sinus rhythm


Rate: normal


EKG Comments: 





Ventricular rate 77 bpm, AR interval 188 ms, QRS duration 80 ms,  ms, 

PRT axes 61/31/28.


Normal sinus rhythm, cannot rule out anterior infarct age undetermined.


Abnormal ECG.





- Radiology Data


Radiology results: report reviewed, image reviewed


Chest x-ray: No active cardiopulmonary disease.  Normal heart.  No adverse 

change.





Disposition


Clinical Impression: 


 Dehydration, Dizziness





Disposition: HOME SELF-CARE


Condition: Stable


Instructions (If sedation given, give patient instructions):  Dizziness (ED)


Additional Instructions: 


Please return to the Emergency Department if symptoms worsen or any other 

concerns.


Increase oral fluids, should be drinking approximately  ounces of water 

per day.


Avoid any strenuous activity or exercise due to recent lumbar compression 

fracture.


Continue to follow-up with orthopedist as planned.


Continue take at home medications as prescribed.





Is patient prescribed a controlled substance at d/c from ED?: No


Referrals: 


Siva Rodgers MD [Primary Care Provider] - 1-2 days


Time of Disposition: 15:35

## 2021-06-26 NOTE — XR
EXAMINATION TYPE: XR chest 2V

 

DATE OF EXAM: 6/26/2021

 

COMPARISON: 3/12/2021

 

HISTORY: Short of breath. Headache.

 

TECHNIQUE: 2 views

 

FINDINGS: Heart and mediastinum are normal. Lungs are clear. Diaphragm is normal. Bony thorax is inta
ct. There is cervical spine fusion surgery.

 

IMPRESSION: No active cardiopulmonary disease. Normal heart. No adverse change.

## 2021-06-30 ENCOUNTER — HOSPITAL ENCOUNTER (OUTPATIENT)
Dept: HOSPITAL 47 - BARWHC3 | Age: 56
End: 2021-06-30
Attending: SURGERY
Payer: MEDICARE

## 2021-06-30 VITALS — TEMPERATURE: 98 F | DIASTOLIC BLOOD PRESSURE: 73 MMHG | HEART RATE: 94 BPM | SYSTOLIC BLOOD PRESSURE: 134 MMHG

## 2021-06-30 VITALS — BODY MASS INDEX: 44.1 KG/M2

## 2021-06-30 DIAGNOSIS — I11.9: ICD-10-CM

## 2021-06-30 DIAGNOSIS — D50.9: ICD-10-CM

## 2021-06-30 DIAGNOSIS — K21.9: ICD-10-CM

## 2021-06-30 DIAGNOSIS — E86.0: ICD-10-CM

## 2021-06-30 DIAGNOSIS — J12.82: ICD-10-CM

## 2021-06-30 DIAGNOSIS — U07.1: ICD-10-CM

## 2021-06-30 DIAGNOSIS — F32.9: ICD-10-CM

## 2021-06-30 DIAGNOSIS — E55.9: ICD-10-CM

## 2021-06-30 DIAGNOSIS — M17.0: ICD-10-CM

## 2021-06-30 DIAGNOSIS — Z98.84: ICD-10-CM

## 2021-06-30 DIAGNOSIS — E11.40: ICD-10-CM

## 2021-06-30 DIAGNOSIS — D72.829: ICD-10-CM

## 2021-06-30 DIAGNOSIS — Z86.14: ICD-10-CM

## 2021-06-30 DIAGNOSIS — E66.01: Primary | ICD-10-CM

## 2021-06-30 DIAGNOSIS — E78.5: ICD-10-CM

## 2021-06-30 DIAGNOSIS — M47.9: ICD-10-CM

## 2021-06-30 DIAGNOSIS — M79.3: ICD-10-CM

## 2021-06-30 DIAGNOSIS — E60: ICD-10-CM

## 2021-06-30 DIAGNOSIS — G47.33: ICD-10-CM

## 2021-06-30 DIAGNOSIS — Z71.3: ICD-10-CM

## 2021-06-30 DIAGNOSIS — Z87.891: ICD-10-CM

## 2021-06-30 DIAGNOSIS — Z88.2: ICD-10-CM

## 2021-06-30 DIAGNOSIS — Z79.4: ICD-10-CM

## 2021-06-30 LAB — GLUCOSE BLD-MCNC: 194 MG/DL (ref 75–99)

## 2021-06-30 PROCEDURE — 99211 OFF/OP EST MAY X REQ PHY/QHP: CPT

## 2021-06-30 NOTE — P.PN
Subjective


Progress Note Date: 06/30/21





DATE OF SERVICE: 06/30/2021





CHIEF COMPLAINT: Status post sleeve gastrectomy





HISTORY OF PRESENT ILLNESS:  Melita Villegas is a 56-year-old female status post

sleeve gastrectomy, 03/01/2021.  She is over 3 months out. She comes in as her 

blood pressure is low. She has lost 90+ pounds. She broke her back 3 weeks ago 

and needed surgery. She has been falling much and was hospitalized. She comes in

with management of her morbid obesity. She has hypertensive heart disease and is

on blood pressure medications. 





At height of 5 feet 5 inches, her ideal body weight is 149 pounds.  Her highest 

weight is 366 pounds, BMI 61.0.  She comes in 264 pounds from 289 pounds, 1 

month ago. She has lost 25 pound in 1 month.  Her body mass index is down to 

44.1.  Her lifetime weight loss is 102 pounds. Percent excess lifetime weight 

loss is 47 %. She is 115 pounds overweight.





PHYSICAL EXAM: 


VITAL SIGNS: Height 5 foot 5 inches, weight 289 pounds. BMI 48.3


                                   Vital Signs











Temp  98 F   06/30/21 16:34


 


Pulse  94   06/30/21 16:34


 


Resp      


 


BP  134/73   06/30/21 16:34


 


Pulse Ox      














GENERAL: Well-developed in no acute distress.  


HEENT: No scleral icterus.  Extraocular movements grossly intact.  Hears 

conversational speech.  No nasal drainage.


NECK: Supple without lymphadenopathy. 


CHEST: Nonlabored respirations with equal bilateral excursions. 


CARDIOVASCULAR: Regular rate and regular rhythm. Distal 2+ pulses. 


ABDOMEN: Obese, soft, nontender, nondistended. 


MUSCULOSKELETAL: No clubbing, cyanosis. 


NEURO: No focal or lateralizing signs. Cranial nerves 2 through 12 grossly 

within normal limits. 


PSYCH: Appropriate affect. Alert and oriented to person, place and time.


SKIN: Good skin turgor.  Well perfused.





ASSESSMENT: 


1.  Morbid obesity due to excess calories


2.  Body mass index of 61.0, initial to 44.1


3.  Gastroesophageal reflux disease


4.  Obstructive sleep apnea


5.  Hyperlipidemia


6.  Diabetes type 2, insulin dependent


7.  Osteoarthritis of the knees


8.  Osteoarthritis of the back


9.  Neuropathy


10.  Depressive disorder


11.  History of MRSA infection


12.  Post op nausea and vomiting


13.  Leukocystosis


14.  Iron deficiency anemia


15.  Vitamin D deficiency


16.  Zinc deficiency.


17.  Status post sleeve gastrectomy


18.  COVID pneumonia


19.  Dehydration. 


20.  Chronic panniculitis


21.  Bariatric noncompliant to dietary guidelines


22.  Inadequate protein intake


23.  Dietary surveillance and counseling


24.  Hypertensive heart disease.





PLAN: 


1.  Recommend full bariatric blood work. 


2.  May need neurology assessment for chronic falls. 


3.  Recommend protein intake of 65 to 75 grams daily. 





Objective





- Labs


Labs: 


                  Abnormal Lab Results - Last 24 Hours (Table)











  06/30/21 Range/Units





  15:54 


 


POC Glucose (mg/dL)  194 H  (75-99)  mg/dL

## 2021-07-09 ENCOUNTER — HOSPITAL ENCOUNTER (OUTPATIENT)
Dept: HOSPITAL 47 - LABWHC1 | Age: 56
Discharge: HOME | End: 2021-07-09
Attending: SURGERY
Payer: MEDICARE

## 2021-07-09 DIAGNOSIS — K50.90: ICD-10-CM

## 2021-07-09 DIAGNOSIS — E89.1: ICD-10-CM

## 2021-07-09 DIAGNOSIS — D50.8: ICD-10-CM

## 2021-07-09 DIAGNOSIS — K74.1: ICD-10-CM

## 2021-07-09 DIAGNOSIS — E66.01: Primary | ICD-10-CM

## 2021-07-09 DIAGNOSIS — E55.9: ICD-10-CM

## 2021-07-09 DIAGNOSIS — E44.0: ICD-10-CM

## 2021-07-09 DIAGNOSIS — N19: ICD-10-CM

## 2021-07-09 DIAGNOSIS — Z98.84: ICD-10-CM

## 2021-07-09 LAB
APTT BLD: 22.8 SEC (ref 22–30)
INR PPP: 1 (ref ?–1.2)
PT BLD: 10.4 SEC (ref 9–12)

## 2021-07-09 PROCEDURE — 85027 COMPLETE CBC AUTOMATED: CPT

## 2021-07-09 PROCEDURE — 84134 ASSAY OF PREALBUMIN: CPT

## 2021-07-09 PROCEDURE — 85610 PROTHROMBIN TIME: CPT

## 2021-07-09 PROCEDURE — 83970 ASSAY OF PARATHORMONE: CPT

## 2021-07-09 PROCEDURE — 80061 LIPID PANEL: CPT

## 2021-07-09 PROCEDURE — 85730 THROMBOPLASTIN TIME PARTIAL: CPT

## 2021-07-09 PROCEDURE — 80053 COMPREHEN METABOLIC PANEL: CPT

## 2021-07-09 PROCEDURE — 84590 ASSAY OF VITAMIN A: CPT

## 2021-07-09 PROCEDURE — 84630 ASSAY OF ZINC: CPT

## 2021-07-09 PROCEDURE — 82306 VITAMIN D 25 HYDROXY: CPT

## 2021-07-09 PROCEDURE — 83735 ASSAY OF MAGNESIUM: CPT

## 2021-07-09 PROCEDURE — 36415 COLL VENOUS BLD VENIPUNCTURE: CPT

## 2021-07-09 PROCEDURE — 82607 VITAMIN B-12: CPT

## 2021-07-09 PROCEDURE — 82525 ASSAY OF COPPER: CPT

## 2021-07-09 PROCEDURE — 84100 ASSAY OF PHOSPHORUS: CPT

## 2021-07-09 PROCEDURE — 83540 ASSAY OF IRON: CPT

## 2021-07-09 PROCEDURE — 84255 ASSAY OF SELENIUM: CPT

## 2021-07-09 PROCEDURE — 84425 ASSAY OF VITAMIN B-1: CPT

## 2021-07-09 PROCEDURE — 82746 ASSAY OF FOLIC ACID SERUM: CPT

## 2021-07-09 PROCEDURE — 84443 ASSAY THYROID STIM HORMONE: CPT

## 2021-07-09 PROCEDURE — 82728 ASSAY OF FERRITIN: CPT

## 2021-07-09 PROCEDURE — 83036 HEMOGLOBIN GLYCOSYLATED A1C: CPT

## 2021-07-09 PROCEDURE — 83550 IRON BINDING TEST: CPT

## 2021-07-10 LAB
ALBUMIN SERPL-MCNC: 4.3 G/DL (ref 3.8–4.9)
ALBUMIN/GLOB SERPL: 1.72 G/DL (ref 1.6–3.17)
ALP SERPL-CCNC: 137 U/L (ref 41–126)
ALT SERPL-CCNC: 28 U/L (ref 8–44)
ANION GAP SERPL CALC-SCNC: 9 MMOL/L (ref 4–12)
AST SERPL-CCNC: 33 U/L (ref 13–35)
BUN SERPL-SCNC: 13 MG/DL (ref 9–27)
BUN/CREAT SERPL: 14.44 RATIO (ref 12–20)
CALCIUM SPEC-MCNC: 9.4 MG/DL (ref 8.7–10.3)
CHLORIDE SERPL-SCNC: 105 MMOL/L (ref 96–109)
CHOLEST SERPL-MCNC: 143 MG/DL (ref 0–200)
CO2 SERPL-SCNC: 27 MMOL/L (ref 21.6–31.8)
ERYTHROCYTE [DISTWIDTH] IN BLOOD BY AUTOMATED COUNT: 3.98 X 10*6/UL (ref 4.1–5.2)
ERYTHROCYTE [DISTWIDTH] IN BLOOD: 14.1 % (ref 11.5–14.5)
FERRITIN SERPL-MCNC: 765.5 NG/ML (ref 10–291)
GLOBULIN SER CALC-MCNC: 2.5 G/DL (ref 1.6–3.3)
GLUCOSE SERPL-MCNC: 255 MG/DL (ref 70–110)
HBA1C MFR BLD: 7.8 % (ref 4–6)
HCT VFR BLD AUTO: 35.7 % (ref 37.2–46.3)
HDLC SERPL-MCNC: 37 MG/DL (ref 40–60)
HGB BLD-MCNC: 11.6 G/DL (ref 12–15)
IRON SERPL-MCNC: 61 UG/DL (ref 50–170)
LDLC SERPL CALC-MCNC: 80.4 MG/DL (ref 0–131)
MAGNESIUM SPEC-SCNC: 1.8 MG/DL (ref 1.5–2.4)
MCH RBC QN AUTO: 29.1 PG (ref 27–32)
MCHC RBC AUTO-ENTMCNC: 32.5 G/DL (ref 32–37)
MCV RBC AUTO: 89.7 FL (ref 80–97)
PLATELET # BLD AUTO: 227 X 10*3/UL (ref 140–440)
POTASSIUM SERPL-SCNC: 4.6 MMOL/L (ref 3.5–5.5)
PREALB SERPL-MCNC: 18 MG/DL (ref 18–42)
PROT SERPL-MCNC: 6.8 G/DL (ref 6.2–8.2)
SODIUM SERPL-SCNC: 141 MMOL/L (ref 135–145)
TIBC SERPL-MCNC: 274 UG/DL (ref 228–460)
TRIGL SERPL-MCNC: 128 MG/DL (ref 0–149)
VIT B12 SERPL-MCNC: 913 PG/ML (ref 200–944)
VLDLC SERPL CALC-MCNC: 25.6 MG/DL (ref 5–40)
WBC # BLD AUTO: 7.18 X 10*3/UL (ref 4.5–10)

## 2021-07-30 ENCOUNTER — HOSPITAL ENCOUNTER (OUTPATIENT)
Dept: HOSPITAL 47 - RADMAMWWP | Age: 56
Discharge: HOME | End: 2021-07-30
Attending: INTERNAL MEDICINE
Payer: MEDICARE

## 2021-07-30 DIAGNOSIS — Z12.31: Primary | ICD-10-CM

## 2021-07-30 DIAGNOSIS — Z79.3: ICD-10-CM

## 2021-07-30 PROCEDURE — 77067 SCR MAMMO BI INCL CAD: CPT

## 2021-08-03 NOTE — MM
Reason for exam: screening  (asymptomatic).

Last mammogram was performed 15 years ago.



History:

Took hormonal contraceptives for 2 months.



Physical Findings:

A clinical breast exam by your physician is recommended on an annual basis and 

results should be correlated with mammographic findings.



MG Screening Mammo w CAD

Bilateral CC and MLO view(s) were taken.

No prior studies available for comparison.

There are scattered fibroglandular densities.  There is no discrete abnormality.





ASSESSMENT: Negative, BI-RAD 1



RECOMMENDATION:

Routine screening mammogram of both breasts in 1 year.

## 2021-09-14 ENCOUNTER — HOSPITAL ENCOUNTER (OUTPATIENT)
Dept: HOSPITAL 47 - RADBDWWP | Age: 56
Discharge: HOME | End: 2021-09-14
Attending: INTERNAL MEDICINE
Payer: MEDICARE

## 2021-09-14 DIAGNOSIS — Z78.0: ICD-10-CM

## 2021-09-14 DIAGNOSIS — Z13.820: Primary | ICD-10-CM

## 2021-09-14 DIAGNOSIS — M85.89: ICD-10-CM

## 2021-09-14 DIAGNOSIS — M06.9: ICD-10-CM

## 2021-09-14 PROCEDURE — 77080 DXA BONE DENSITY AXIAL: CPT

## 2021-09-14 NOTE — BD
EXAMINATION TYPE: Axial Bone Density

 

DATE OF EXAM: 9/14/2021

 

COMPARISON: NONE

 

CLINICAL HISTORY: Postmenopausal screening

 

Height:  64

Weight:  243.9

 

FRAX RISK QUESTIONS:

Alcohol (3 or more units per day):  no

Family History (Parent hip fracture):  yes

Glucocorticoids (More than 3mos):  no

           (Ex: prednisone, prednisolone, methylprednisolone, dexamethasone, and hydrocortisone).    
     

History of Fracture in Adulthood: yes

Secondary Osteoporosis:

  1.  Type 1 Diabetes: no

  2.  Hyperthyroidism: no

  3.  Menopause before 45: yes

  4.  Malnutrition: no

  5.  Chronic liver disease: no

Rheumatoid Arthritis: yes

Current Tobacco Use: no

 

RISK FACTORS 

HISTORY OF: 

Spine Fracture: lumbar spine 

When: 1 1/2 months ago

Surgery to Spine/Hip(right/left)/Wrist (right/left): no

Family History of Osteoporosis: no

Active: yes

Diet low in dairy products/other sources of calcium:  yes

Postmenopausal woman: yes

Lost more than 2 inches in height since high school: no

 

MEDICATIONS: 

 

 

Additional History:

 

 

EXAM MEASUREMENTS: 

Bone mineral densitometry was performed using the [facility].

 

Bone mineral density about the R hip (g/cm2): 0.920

Bone mineral density about the L hip (g/cm2): 0.835

T Score values are as follows:

-----R Neck: -0.8

-----L Neck: -1.5

-----R Total: -0.4

-----L Total: -0.3

Bone mineral density : baseline

 

Bone mineral density about the L Wrist (g/cm2): 0.795

T Score values are as follows: 

-----Dist. R+U: 0.8

-----Prox. R+U: 2.8

-----Radius total: 2.0

Bone mineral density : baseline

 

IMPRESSION:

Osteopenia (T Score between -2.5 and -1).

 

There is slightly increased risk of fracture and the patient may be considered 

for treatment. 

 

Re-Screen 2-5 years.

 

NOTE:  T-SCORE=SD OF THE YOUNG ADULT MEAN.

## 2022-11-09 ENCOUNTER — HOSPITAL ENCOUNTER (OUTPATIENT)
Dept: HOSPITAL 47 - BARWHC3 | Age: 57
End: 2022-11-09
Attending: SURGERY
Payer: MEDICARE

## 2022-11-09 VITALS — HEART RATE: 70 BPM | TEMPERATURE: 98.4 F | SYSTOLIC BLOOD PRESSURE: 120 MMHG | DIASTOLIC BLOOD PRESSURE: 75 MMHG

## 2022-11-09 VITALS — BODY MASS INDEX: 37.8 KG/M2

## 2022-11-09 DIAGNOSIS — Z87.891: ICD-10-CM

## 2022-11-09 DIAGNOSIS — Z88.2: ICD-10-CM

## 2022-11-09 DIAGNOSIS — E66.01: Primary | ICD-10-CM

## 2022-11-09 PROCEDURE — 99211 OFF/OP EST MAY X REQ PHY/QHP: CPT

## 2022-11-09 NOTE — P.BASOAP
Subjective


Progress Note Date: 11/09/22





Recommend blood sugar. She has high blood sugar glucose. She has not had recent 

labs. Recommend see cardiologist. Needs cardiac clearance.  Patient is taken to 

undergo skin removal surgery.  Dermatologist and obtained





Objective





- Vital Signs


Vital signs: 


                                   Vital Signs











Temp  98.4 F   11/09/22 16:06


 


Pulse  70   11/09/22 16:06


 


Resp      


 


BP  120/75   11/09/22 16:06


 


Pulse Ox      


 


FiO2      








                                 Intake & Output











 11/08/22 11/09/22 11/09/22





 18:59 06:59 18:59


 


Weight   102.965 kg














Assessment/Plan


Plan: 


Date: 11/09/22





Initial Weight: 157.397 kg





Initial BMI: 57.7





Current Weight: 102.965 kg





Current BMI: 37.8





Type of Surgery: 





Total Volume in Band: 





Previous Volume: 





Volume Removed: 





Volume Added: 





Band Size:

## 2022-11-14 ENCOUNTER — HOSPITAL ENCOUNTER (OUTPATIENT)
Dept: HOSPITAL 47 - LABWHC1 | Age: 57
Discharge: HOME | End: 2022-11-14
Attending: SURGERY
Payer: MEDICARE

## 2022-11-14 DIAGNOSIS — T56.894A: ICD-10-CM

## 2022-11-14 DIAGNOSIS — K74.1: ICD-10-CM

## 2022-11-14 DIAGNOSIS — N19: ICD-10-CM

## 2022-11-14 DIAGNOSIS — D50.9: ICD-10-CM

## 2022-11-14 DIAGNOSIS — E66.01: Primary | ICD-10-CM

## 2022-11-14 DIAGNOSIS — E44.1: ICD-10-CM

## 2022-11-14 DIAGNOSIS — E44.0: ICD-10-CM

## 2022-11-14 DIAGNOSIS — K91.2: ICD-10-CM

## 2022-11-14 DIAGNOSIS — E45: ICD-10-CM

## 2022-11-14 DIAGNOSIS — E46: ICD-10-CM

## 2022-11-14 DIAGNOSIS — D50.8: ICD-10-CM

## 2022-11-14 DIAGNOSIS — E55.9: ICD-10-CM

## 2022-11-14 LAB
APTT BLD: 24 SEC (ref 22–30)
ERYTHROCYTE [DISTWIDTH] IN BLOOD BY AUTOMATED COUNT: 4.35 X 10*6/UL (ref 4.1–5.2)
ERYTHROCYTE [DISTWIDTH] IN BLOOD: 14.6 % (ref 11.5–14.5)
HCT VFR BLD AUTO: 39 % (ref 37.2–46.3)
HGB BLD-MCNC: 12.2 G/DL (ref 12–15)
INR PPP: 0.9 (ref ?–1.2)
MCH RBC QN AUTO: 28 PG (ref 27–32)
MCHC RBC AUTO-ENTMCNC: 31.3 G/DL (ref 32–37)
MCV RBC AUTO: 89.7 FL (ref 80–97)
NRBC BLD AUTO-RTO: 0 /100 WBCS (ref 0–0)
PLATELET # BLD AUTO: 271 X 10*3/UL (ref 140–440)
PT BLD: 9.6 SEC (ref 9–12)
WBC # BLD AUTO: 10.14 X 10*3/UL (ref 4.5–10)

## 2022-11-14 PROCEDURE — 84100 ASSAY OF PHOSPHORUS: CPT

## 2022-11-14 PROCEDURE — 85610 PROTHROMBIN TIME: CPT

## 2022-11-14 PROCEDURE — 84425 ASSAY OF VITAMIN B-1: CPT

## 2022-11-14 PROCEDURE — 83735 ASSAY OF MAGNESIUM: CPT

## 2022-11-14 PROCEDURE — 36415 COLL VENOUS BLD VENIPUNCTURE: CPT

## 2022-11-14 PROCEDURE — 83036 HEMOGLOBIN GLYCOSYLATED A1C: CPT

## 2022-11-14 PROCEDURE — 83540 ASSAY OF IRON: CPT

## 2022-11-14 PROCEDURE — 83970 ASSAY OF PARATHORMONE: CPT

## 2022-11-14 PROCEDURE — 83550 IRON BINDING TEST: CPT

## 2022-11-14 PROCEDURE — 84590 ASSAY OF VITAMIN A: CPT

## 2022-11-14 PROCEDURE — 84630 ASSAY OF ZINC: CPT

## 2022-11-14 PROCEDURE — 84443 ASSAY THYROID STIM HORMONE: CPT

## 2022-11-14 PROCEDURE — 80061 LIPID PANEL: CPT

## 2022-11-14 PROCEDURE — 82525 ASSAY OF COPPER: CPT

## 2022-11-14 PROCEDURE — 85027 COMPLETE CBC AUTOMATED: CPT

## 2022-11-14 PROCEDURE — 82306 VITAMIN D 25 HYDROXY: CPT

## 2022-11-14 PROCEDURE — 84134 ASSAY OF PREALBUMIN: CPT

## 2022-11-14 PROCEDURE — 82746 ASSAY OF FOLIC ACID SERUM: CPT

## 2022-11-14 PROCEDURE — 82607 VITAMIN B-12: CPT

## 2022-11-14 PROCEDURE — 85730 THROMBOPLASTIN TIME PARTIAL: CPT

## 2022-11-14 PROCEDURE — 80053 COMPREHEN METABOLIC PANEL: CPT

## 2022-11-14 PROCEDURE — 84255 ASSAY OF SELENIUM: CPT

## 2022-11-15 LAB
ALBUMIN SERPL-MCNC: 4.2 G/DL (ref 3.8–4.9)
ALBUMIN/GLOB SERPL: 1.51 G/DL (ref 1.6–3.17)
ALP SERPL-CCNC: 84 U/L (ref 41–126)
ALT SERPL-CCNC: 22 U/L (ref 8–44)
ANION GAP SERPL CALC-SCNC: 12.9 MMOL/L (ref 10–18)
AST SERPL-CCNC: 25 U/L (ref 13–35)
BUN SERPL-SCNC: 25 MG/DL (ref 9–27)
BUN/CREAT SERPL: 27.29 RATIO (ref 12–20)
CALCIUM SPEC-MCNC: 9 MG/DL (ref 8.7–10.3)
CHLORIDE SERPL-SCNC: 96 MMOL/L (ref 96–109)
CHOLEST SERPL-MCNC: 221 MG/DL (ref 0–200)
CO2 SERPL-SCNC: 25.5 MMOL/L (ref 20–27.5)
GLOBULIN SER CALC-MCNC: 2.8 G/DL (ref 1.6–3.3)
GLUCOSE SERPL-MCNC: 289 MG/DL (ref 70–110)
HDLC SERPL-MCNC: 62.3 MG/DL (ref 40–60)
IRON SERPL-MCNC: 70 UG/DL (ref 50–170)
LDLC SERPL CALC-MCNC: 110.7 MG/DL (ref 0–131)
MAGNESIUM SPEC-SCNC: 2.5 MG/DL (ref 1.5–2.4)
POTASSIUM SERPL-SCNC: 4.3 MMOL/L (ref 3.5–5.5)
PREALB SERPL-MCNC: 21.4 MG/DL (ref 18–42)
PROT SERPL-MCNC: 7 G/DL (ref 6.2–8.2)
SODIUM SERPL-SCNC: 135 MMOL/L (ref 135–145)
TIBC SERPL-MCNC: 350 UG/DL (ref 228–460)
TRIGL SERPL-MCNC: 240 MG/DL (ref 0–149)
VIT B12 SERPL-MCNC: 533 PG/ML (ref 200–944)
VLDLC SERPL CALC-MCNC: 48 MG/DL (ref 5–40)
ZINC SERPL-MCNC: 75 UG/DL (ref 60–130)

## 2022-11-16 LAB — VIT B1 BLD-MCNC: 83 UG/L (ref 38–122)

## 2023-01-23 ENCOUNTER — HOSPITAL ENCOUNTER (OUTPATIENT)
Dept: HOSPITAL 47 - RADMAMWWP | Age: 58
Discharge: HOME | End: 2023-01-23
Attending: FAMILY MEDICINE
Payer: MEDICARE

## 2023-01-23 DIAGNOSIS — Z78.0: ICD-10-CM

## 2023-01-23 DIAGNOSIS — Z12.31: Primary | ICD-10-CM

## 2023-01-23 PROCEDURE — 77067 SCR MAMMO BI INCL CAD: CPT

## 2023-01-23 PROCEDURE — 77063 BREAST TOMOSYNTHESIS BI: CPT

## 2023-01-24 NOTE — MM
Reason for Exam: Screening  (asymptomatic). 

Last mammogram was performed 1 year(s) and 6 month(s) ago. 





Patient History: 

Menarche at age 15. First Full-Term Pregnancy at age 20. Postmenopausal. Hormonal Contraceptives for

2 months.

Last menstrual period:   / /2017





Risk Values: 

Leatha 5 year model risk: 0.8%.

NCI Lifetime model risk: 5.3%.





Prior Study Comparison: 

7/28/2006 Bilateral Screening Mammogram, [facility]. 7/30/2021 Bilateral Screening Mammogram, [facility]. 





Tissue Density: 

There are scattered fibroglandular densities.





Findings: 

Analyzed By CAD. 

There is no suspicious group of microcalcifications or new suspicious mass in either breast. 





Overall Assessment: Negative, BI-RAD 1





Management: 

Screening Mammogram of both breasts in 1 year.

1. Patient should continue monthly self breast exams.

2. A clinical breast exam by your physician is recommended on an annual basis.

3. This exam should not preclude additional follow-up of suspicious palpable abnormalities.



Electronically signed and approved by: Natali Shah M.D. Radiologist

## 2023-03-29 ENCOUNTER — HOSPITAL ENCOUNTER (EMERGENCY)
Dept: HOSPITAL 47 - EC | Age: 58
Discharge: HOME | End: 2023-03-29
Payer: MEDICARE

## 2023-03-29 VITALS — TEMPERATURE: 98.1 F | RESPIRATION RATE: 17 BRPM | DIASTOLIC BLOOD PRESSURE: 77 MMHG | SYSTOLIC BLOOD PRESSURE: 157 MMHG

## 2023-03-29 VITALS — HEART RATE: 79 BPM

## 2023-03-29 DIAGNOSIS — Z79.899: ICD-10-CM

## 2023-03-29 DIAGNOSIS — Z20.822: ICD-10-CM

## 2023-03-29 DIAGNOSIS — Z79.4: ICD-10-CM

## 2023-03-29 DIAGNOSIS — Z87.891: ICD-10-CM

## 2023-03-29 DIAGNOSIS — J06.9: Primary | ICD-10-CM

## 2023-03-29 DIAGNOSIS — E11.9: ICD-10-CM

## 2023-03-29 DIAGNOSIS — Z79.84: ICD-10-CM

## 2023-03-29 DIAGNOSIS — E78.5: ICD-10-CM

## 2023-03-29 DIAGNOSIS — Z88.2: ICD-10-CM

## 2023-03-29 DIAGNOSIS — Z88.1: ICD-10-CM

## 2023-03-29 DIAGNOSIS — I10: ICD-10-CM

## 2023-03-29 DIAGNOSIS — Z79.82: ICD-10-CM

## 2023-03-29 PROCEDURE — 87651 STREP A DNA AMP PROBE: CPT

## 2023-03-29 PROCEDURE — 87635 SARS-COV-2 COVID-19 AMP PRB: CPT

## 2023-03-29 PROCEDURE — 71046 X-RAY EXAM CHEST 2 VIEWS: CPT

## 2023-03-29 PROCEDURE — 96372 THER/PROPH/DIAG INJ SC/IM: CPT

## 2023-03-29 PROCEDURE — 99283 EMERGENCY DEPT VISIT LOW MDM: CPT

## 2023-03-29 NOTE — ED
URI HPI





- General


Source: patient, RN notes reviewed


Mode of arrival: ambulatory


Limitations: no limitations





<Rose Aguayo - Last Filed: 23 19:45>





<Ivet Ulloa - Last Filed: 23 23:50>





- General


Chief Complaint: Upper Respiratory Infection


Stated Complaint: sore throat


Time Seen by Provider: 23 19:45





- History of Present Illness


Initial Comments: 


Patient is a 57-year-old female who presents to the emergency department with a 

chief complaint of sore throat.  It started 1 week ago and is not improving.  

Patient also reports dry cough and congestion.  She denies chest pain and 

shortness of breath.  No fever.  Denies history of asthma and COPD.  Denies 

recent sick contacts. (Rose Aguayo)


Patient is a 57-year-old female presenting with chief complaint of URI-like 

symptoms.  Patient has had a sore throat, cough, and congestion for the last 

week.  She is also complaining of frontal headache.  She has been taking Tylenol

for her symptoms.  No fevers or chills.  No nausea or vomiting.  No difficulty 

breathing or swallowing.  No chest pain. (Ivet Ulloa)





- Related Data


                                Home Medications











 Medication  Instructions  Recorded  Confirmed


 


Montelukast [Singulair] 10 mg PO DAILY 18


 


Mirabegron [Myrbetriq] 50 mg PO DAILY 20


 


Gabapentin [Neurontin] 800 mg PO BID 21


 


Aspirin EC [Ecotrin Low Dose] 81 mg PO HS 21


 


Atorvastatin [Lipitor] 20 mg PO DAILY 21


 


Escitalopram [Lexapro] 10 mg PO DAILY 21


 


Multivitamin/Iron/Folic Acid 1 tab PO DAILY 21





[Centrum Women Tablet]   


 


Atorvastatin [Lipitor] 20 mg PO DAILY 22


 


Empagliflozin [Jardiance] 10 mg PO DAILY 22


 


Ergocalciferol [Vitamin D2 (1250 1,250 mcg PO WEEKLY 22





Mcg = 20829 Iu)]   


 


Fluconazole [Diflucan] 100 mg PO DAILY 22


 


Glimepiride [Amaryl] 4 mg PO BID 22


 


Ipratropium Bromide 0.06%Nasal 2 spray EA NOSTRIL BID 22





[Atrovent Nasal 0.06%]   


 


Midodrine HCl [ProAmatine] 10 mg PO BID 22


 


Omeprazole [PriLOSEC] 10 mg PO DAILY 22


 


PARoxetine HCL [Paxil] 30 mg PO DAILY 22


 


Pioglitazone HCl 30 mg PO DAILY 22


 


lisinopriL [Zestril] 5 mg PO DAILY 22


 


traMADol HCL 50 mg PO Q6H PRN 22


 


traZODone HCL [Desyrel] 50 mg PO HS 22


 


Insulin Glargine,Hum.rec.anlog 0 units SQ DAILY 22





[Lantus Solostar Pen]   








                                  Previous Rx's











 Medication  Instructions  Recorded


 


Nystatin 100,000 Unit/gm Powd 1 applic TOPICAL BID #60 gm 09/15/21





[Mycostatin Powder]  


 


Benzonatate [Tessalon Perles] 100 mg PO TID PRN #12 capsule 23











                                    Allergies











Allergy/AdvReac Type Severity Reaction Status Date / Time


 


Sulfa (Sulfonamide Allergy  Anaphylaxis Verified 23 19:34





Antibiotics)     














Review of Systems


ROS Other: All systems not noted in ROS Statement are negative.





<Rose Aguayo - Last Filed: 23 19:45>


ROS Other: All systems not noted in ROS Statement are negative.





<Ivet Ulloa - Last Filed: 23 23:50>


ROS Statement: 


Those systems with pertinent positive or pertinent negative responses have been 

documented in the HPI.








Past Medical History


Past Medical History: Diabetes Mellitus, Eye Disorder, Hyperlipidemia, 

Hypertension


Additional Past Medical History / Comment(s): 2/25/15  Pt presented to [facility] ER 

with substernal chest pain that started 24 hhours before coming to ER.  She 

noticed the chest pain when she woke up yesterday.  It is a heaviness and is 

intermittent with variable duration.  She also states she had alittle nausea 

with it.   Other HX:  Pt had recent (1/21/15)cataract surgery with lens implants

bilaterally at [place].  Hospital-post op she had low O2 saturations and was 

told she had a very narrow airway-instead of going home same day, she was in the

hospital for a couple days until her saturations improved.  She was discharged 

with home O2 which she [facility] at [facility] at [facility] and was to follow up today for 

testing for sleep apnea.  Pt states she also has diarrhea fairly frequently. 

Also has hx of 3 ruptured cervical discs with surgery and bilateral retina 

repair.  2018 - intermittently in hospital for 3 months for fluid on lungs, had 

fluid removed


History of Any Multi-Drug Resistant Organisms: MRSA


Date of last positivie culture/infection: 


MDRO Source:: legs and breasts.


Past Surgical History: Bariatric Surgery, Cholecystectomy, Hysterectomy, 

Orthopedic Surgery


Additional Past Surgical History / Comment(s): Bilateral cataract sx with lens 

implants, bilateral retinal repair. Cervical rodding for 3 ruptured discs.  

sleeve gastrectomy         3-1-21


Past Anesthesia/Blood Transfusion Reactions: Postoperative Nausea & Vomiting 

(PONV)


Additional Past Anesthesia/Blood Transfusion Reaction / Comment(s): Pt had 

cataract and lens implant at [facility] on 1/21/15 and afterwards she 

desaturated and was hospitalized.  She was told she has a very narrow airway.  

Pt has never recieved blood.


Past Psychological History: No Psychological Hx Reported


Smoking Status: Former smoker


Past Alcohol Use History: None Reported


Past Drug Use History: None Reported





- Past Family History


  ** Father


Family Medical History: Cancer, Myocardial Infarction (MI)


Additional Family Medical History / Comment(s): Father had 5 MI's and  of 

bone cancer.





  ** Mother


Family Medical History: Cancer


Additional Family Medical History / Comment(s): cervical cancer





<Rose Aguayo - Last Filed: 23 19:45>





General Exam


Limitations: no limitations





<Rose Aguayo - Last Filed: 23 19:45>


Limitations: no limitations


General appearance: alert, in no apparent distress


Head exam: Present: atraumatic, normocephalic, normal inspection


Eye exam: Present: normal appearance


ENT exam: Present: normal exam, normal oropharynx, mucous membranes moist


Neck exam: Present: normal inspection, full ROM, lymphadenopathy


Respiratory exam: Present: normal lung sounds bilaterally.  Absent: respiratory 

distress, wheezes, rales, rhonchi, stridor


Cardiovascular Exam: Present: regular rate, normal rhythm, normal heart sounds. 

Absent: systolic murmur, diastolic murmur, rubs, gallop, clicks


Neurological exam: Present: alert, oriented X3, CN II-XII intact


Psychiatric exam: Present: normal affect, normal mood


Skin exam: Present: warm, dry, intact, normal color.  Absent: rash





<Ivet Ulloa - Last Filed: 23 23:50>





Course


                                   Vital Signs











  23





  19:29 23:01


 


Temperature 97.9 F 98.1 F


 


Pulse Rate 79 79


 


Respiratory 16 17





Rate  


 


Blood Pressure 116/68 157/77


 


O2 Sat by Pulse 99 99





Oximetry  














Medical Decision Making





<Ivet Ulloa - Last Filed: 23 23:50>





- Medical Decision Making


Was pt. sent in by a medical professional or institution (, PA, NP, urgent 

care, hospital, or nursing home...) When possible be specific


@  -No


Did you speak to anyone other than the patient for history (EMS, parent, family,

police, friend...)? What history was obtained from this source 


@  -No


Did you review nursing and triage notes (agree or disagree)?  Why? 


@  -I reviewed and agree with nursing and triage notes


Were old charts reviewed (outside hosp., previous admission, EMS record, old 

EKG, old radiological studies, urgent care reports/EKG's, nursing home records)?

Report findings 


@  -No old charts were reviewed


Differential Diagnosis (chest pain, altered mental status, abdominal pain women,

abdominal pain men, vaginal bleeding, weakness, fever, dyspnea, syncope, 

headache, dizziness, GI bleed, back pain, seizure, CVA, palpatations, mental 

health, musculoskeletal)? 


@  Differential includes URI, bronchitis, pneumonia, strep pharyngitis, this is 

not an all inclusive list


EKG interpreted by me (3pts min.).


@  -As above


X-rays interpreted by me (1pt min.).


@  -Chest x-ray shows no acute process


CT interpreted by me (1pt min.).


@  -None done


U/S interpreted by me (1pt. min.).


@  -None done


What testing was considered but not performed or refused? (CT, X-rays, U/S, 

labs)? Why?


@  -None


What meds were considered but not given or refused? Why?


@  -None


Did you discuss the management of the patient with other professionals 

(professionals i.e. Dr., PA, NP, lab, RT, psych nurse, , , 

teacher, , )? Give summary


@  -No


Was smoking cessation discussed for >3mins.?


@  -No


Was critical care preformed (if so, how long)?


@  -No


Were there social determinants of health that impacted care today? How? 

(Homelessness, low income, unemployed, alcoholism, drug addiction, 

transportation, low edu. Level, literacy, decrease access to med. care, nursing home, 

rehab)?


@  -No


Was there de-escalation of care discussed even if they declined (Discuss DNR or 

withdrawal of care, Hospice)? DNR status


@  -No


What co-morbidities impacted this encounter? (DM, HTN, Smoking, COPD, CAD, C

ancer, CVA, ARF, Chemo, Hep., AIDS, mental health diagnosis, sleep apnea, morbid

obesity)?


@  -None


Was patient admitted / discharged? Hospital course, mention meds given and 

route, prescriptions, significant lab abnormalities, going to OR and other 

pertinent info.


@  -Patient is a 57-year-old female presenting with chief complaint of cough, 

congestion, sore throat, and headache.  On physical examination heart and lungs 

are clear to auscultation, normal HEENT exam.  Chest x-ray is negative.  Patient

is negative for coronavirus in group A strep.  She is resting comfortably no 

signs of distress.  Patient is educated on these findings and on supportive 

treatment at home.  She is given a prescription for Tessalon Perles. Follow-up 

with PCP.  Report back to ER with any new or worsening symptoms.  Discussed 

return parameters and answered all questions.  Patient conveyed verbal 

understanding and agreed to the plan.  I discussed this case in detail with my 

attending Dr. Canales


Undiagnosed new problem with uncertain prognosis?


@  -No


Drug Therapy requiring intensive monitoring for toxicity (Heparin, Nitro, 

Insulin, Cardizem)?


@  -No


Were any procedures done?


@  -No


Diagnosis/symptom?


@  -URI


Acute, or Chronic, or Acute on Chronic?


@  -Acute


Uncomplicated (without systemic symptoms) or Complicated (systemic symptoms)?


@  -Uncomplicated


Side effects of treatment?


@  -No


Exacerbation, Progression, or Severe Exacerbation?


@  -No


Poses a threat to life or bodily function? How? (Chest pain, USA, MI, pneumonia,

PE, COPD, DKA, ARF, appy, cholecystitis, CVA, Diverticulitis, Homicidal, 

Suicidal, threat to staff... and all critical care pts)


@  -No


 (Ivet Ulloa)





- Lab Data


                                   Lab Results











  23 Range/Units





  19:35 22:55 


 


Coronavirus (PCR)  Not Detected   (Not Detectd)  


 


Group A Strep (PCR)   NOT DETECTED  (Not Detectd)  














Disposition





<Rose Aguayo - Last Filed: 23 19:45>


Is patient prescribed a controlled substance at d/c from ED?: No


Time of Disposition: 23:44





<Ivet Ulloa - Last Filed: 23 23:50>


Clinical Impression: 


 Upper respiratory tract infection





Disposition: HOME SELF-CARE


Condition: Good


Instructions (If sedation given, give patient instructions):  Upper Respiratory 

Infection (ED)


Additional Instructions: 


Follow-up with PCP.  Report back to ER with any new or worsening symptoms.  Take

medication as prescribed.  Take Motrin and Tylenol as needed.  Stay well-

hydrated and get plenty of rest.


Prescriptions: 


Benzonatate [Tessalon Perles] 100 mg PO TID PRN #12 capsule


 PRN Reason: Cough


Referrals: 


Emily Capps MD [Primary Care Provider] - 1-2 days

## 2023-03-29 NOTE — XR
EXAMINATION TYPE: XR chest 2V

 

DATE OF EXAM: 3/29/2023

 

COMPARISON: 6/26/2021

 

HISTORY: Syncope

 

TECHNIQUE: 2 views

 

FINDINGS: Heart is normal. Lungs are clear of infiltrate. No heart failure. There are no hilar masses
. There is old healed fracture lateral left seventh rib no pleural effusion.

 

IMPRESSION: No active cardiopulmonary disease. No adverse change.

## 2024-01-24 ENCOUNTER — HOSPITAL ENCOUNTER (OUTPATIENT)
Dept: HOSPITAL 47 - RADMAMWWP | Age: 59
Discharge: HOME | End: 2024-01-24
Attending: FAMILY MEDICINE
Payer: MEDICARE

## 2024-01-24 DIAGNOSIS — M85.89: ICD-10-CM

## 2024-01-24 DIAGNOSIS — Z12.31: Primary | ICD-10-CM

## 2024-01-24 DIAGNOSIS — Z78.0: ICD-10-CM

## 2024-01-24 PROCEDURE — 77063 BREAST TOMOSYNTHESIS BI: CPT

## 2024-01-24 PROCEDURE — 77067 SCR MAMMO BI INCL CAD: CPT

## 2024-01-24 PROCEDURE — 77080 DXA BONE DENSITY AXIAL: CPT

## 2024-01-24 NOTE — BD
EXAMINATION TYPE: Axial Bone Density

 

DATE OF EXAM: 1/24/2024

 

CLINICAL HISTORY: 58 years old Female.  ICD-10 CODE: M85.9 DISORDER OF BONE DENSITY AN

 

Height:  65"

Weight:  234.2lbs

 

FRAX RISK QUESTIONS:

 

Alcohol (3 or more units per day):  No

Family History (Parent hip fracture):  No

Glucocorticoids (More than 3mos):  No

           (Ex: prednisone, prednisolone, methylprednisolone, dexamethasone, and hydrocortisone).    
     

History of Fracture in Adulthood: Yes, spine and left knee

Secondary Osteoporosis:

  1.  Type 1 Diabetes: No

  2.  Hyperthyroidism: No

  3.  Menopause before 45: Yes

  4.  Malnutrition: No

  5.  Chronic liver disease: No

Rheumatoid Arthritis: No

Current Tobacco Use: No

 

RISK FACTORS 

 

HISTORY OF: 

Hip Fracture (Right/Left): No

Spine Fracture: Yes, lumbar

When: 2021

History of Wrist Fracture: No

Surgery to Spine/Hip(right/left)/Wrist (right/left): No

 

 

MEDICATIONS: 

Thyroid Medications:  No

Osteoporosis Medications: No

 

EXAM MEASUREMENTS: 

Bone mineral densitometry was performed using the [facility].

Bone mineral density about the R hip (g/cm2): 0.838

Bone mineral density about the L hip (g/cm2): 0.823

T Score values are as follows:

-----R Neck: -1.4

-----L Neck: -1.5

-----R Total: -1.7

-----L Total: -2.1

 

Z Score values are as follows:

-----R Neck: -1.4

-----L Neck: -1.5

-----R Total: -1.3

-----L Total: -1.7

 

Bone mineral density has: decreased -14.0% since study of: 9/14/2021

 

 

Bone mineral density about the L Wrist (g/cm2): 0.735

T Score values are as follows: 

-----Dist. R+U: 0.0

-----Prox. R+U: 1.4

-----Radius total: 1.0

 

Z Score values are as follows: 

-----Dist. R+U: 0.8

-----Prox. R+U: 2.1

-----Radius total: 1.7

 

Bone mineral density has: decreased -10.8% since study of:

 

FRAX%s: The graph provided illustrates a 14.0% chance for a major osteoporotic fx and a 1.8% chance f
or the hips probability for fx in 10 years time.

 

 

 

 

IMPRESSION:

Osteopenia (T Score between -2.5 and -1).

 

There is slightly increased risk of fracture and the patient may be considered 

for treatment. 

 

Re-Screen 2-5 years.

 

NOTE:  T-SCORE=SD OF THE YOUNG ADULT MEAN.

## 2024-01-25 NOTE — MM
Reason for Exam: Screening  (asymptomatic). 

Last screening mammogram was performed 12 month(s) ago.





Patient History: 

Menarche at age 15. First Full-Term Pregnancy at age 20. Postmenopausal. Hormonal Contraceptives for

2 months. 





Risk Values: 

Leatha 5 year model risk: 0.8%.

NCI Lifetime model risk: 5.2%.





Prior Study Comparison: 

7/28/2006 Bilateral Screening Mammogram, [facility]. 7/30/2021 Bilateral Screening Mammogram, [facility].

1/23/2023 Bilateral MG 3D screening mammo w/cad, [facility]. 





Tissue Density: 

The breast tissue is almost entirely fat.





Findings: 

Analyzed By CAD. 

There is no suspicious group of microcalcifications or new suspicious mass. 





Overall Assessment: Negative, BI-RAD 1





Management: 

Screening Mammogram of both breasts in 1 year.

[facility] will attempt to contact patient to return for supplemental views and

ultrasound if indicated.



Patient should continue monthly self-breast exams.  A clinical breast exam by your physician is

recommended on an annual basis.

This exam should not preclude additional follow-up of suspicious palpable abnormalities.



Note on Leatha scores and lifetime risk:

1. A Leatha score greater than 3% is considered moderate risk. If this is the case, consider

specialist referral to assess eligibility for a risk reducing agent.

2. If overall lifetime risk for the development of breast cancer is 20% or higher, the patient may

qualify for future screening with alternating mammogram and breast MRI.



Electronically signed and approved by: Santiago Carpio DO

## 2024-02-08 ENCOUNTER — HOSPITAL ENCOUNTER (EMERGENCY)
Dept: HOSPITAL 47 - EC | Age: 59
Discharge: HOME | End: 2024-02-08
Payer: MEDICARE

## 2024-02-08 VITALS — TEMPERATURE: 98 F

## 2024-02-08 DIAGNOSIS — E86.0: ICD-10-CM

## 2024-02-08 DIAGNOSIS — I10: ICD-10-CM

## 2024-02-08 DIAGNOSIS — E11.9: ICD-10-CM

## 2024-02-08 DIAGNOSIS — Z79.84: ICD-10-CM

## 2024-02-08 DIAGNOSIS — Z79.4: ICD-10-CM

## 2024-02-08 DIAGNOSIS — I95.1: Primary | ICD-10-CM

## 2024-02-08 DIAGNOSIS — E78.5: ICD-10-CM

## 2024-02-08 DIAGNOSIS — Z79.82: ICD-10-CM

## 2024-02-08 DIAGNOSIS — Z88.2: ICD-10-CM

## 2024-02-08 DIAGNOSIS — Z79.899: ICD-10-CM

## 2024-02-08 DIAGNOSIS — Z87.891: ICD-10-CM

## 2024-02-08 LAB
ALBUMIN SERPL-MCNC: 4.2 G/DL (ref 3.5–5)
ALP SERPL-CCNC: 94 U/L (ref 38–126)
ALT SERPL-CCNC: 24 U/L (ref 4–34)
ANION GAP SERPL CALC-SCNC: 8 MMOL/L
AST SERPL-CCNC: 38 U/L (ref 14–36)
BASOPHILS # BLD AUTO: 0.1 K/UL (ref 0–0.2)
BASOPHILS NFR BLD AUTO: 1 %
BUN SERPL-SCNC: 26 MG/DL (ref 7–17)
CALCIUM SPEC-MCNC: 9.1 MG/DL (ref 8.4–10.2)
CHLORIDE SERPL-SCNC: 103 MMOL/L (ref 98–107)
CO2 SERPL-SCNC: 27 MMOL/L (ref 22–30)
EOSINOPHIL # BLD AUTO: 0.3 K/UL (ref 0–0.7)
EOSINOPHIL NFR BLD AUTO: 3 %
ERYTHROCYTE [DISTWIDTH] IN BLOOD BY AUTOMATED COUNT: 4.45 M/UL (ref 3.8–5.4)
ERYTHROCYTE [DISTWIDTH] IN BLOOD: 14.4 % (ref 11.5–15.5)
GLUCOSE BLD-MCNC: 90 MG/DL (ref 70–110)
GLUCOSE SERPL-MCNC: 91 MG/DL (ref 74–99)
HCT VFR BLD AUTO: 36.1 % (ref 34–46)
HGB BLD-MCNC: 12.8 GM/DL (ref 11.4–16)
LYMPHOCYTES # SPEC AUTO: 4.7 K/UL (ref 1–4.8)
LYMPHOCYTES NFR SPEC AUTO: 43 %
MCH RBC QN AUTO: 28.7 PG (ref 25–35)
MCHC RBC AUTO-ENTMCNC: 35.4 G/DL (ref 31–37)
MCV RBC AUTO: 81.2 FL (ref 80–100)
MONOCYTES # BLD AUTO: 0.5 K/UL (ref 0–1)
MONOCYTES NFR BLD AUTO: 5 %
NEUTROPHILS # BLD AUTO: 4.9 K/UL (ref 1.3–7.7)
NEUTROPHILS NFR BLD AUTO: 46 %
PLATELET # BLD AUTO: 242 K/UL (ref 150–450)
POTASSIUM SERPL-SCNC: 3.1 MMOL/L (ref 3.5–5.1)
PROT SERPL-MCNC: 7.1 G/DL (ref 6.3–8.2)
SODIUM SERPL-SCNC: 138 MMOL/L (ref 137–145)
WBC # BLD AUTO: 10.8 K/UL (ref 3.8–10.6)

## 2024-02-08 PROCEDURE — 36415 COLL VENOUS BLD VENIPUNCTURE: CPT

## 2024-02-08 PROCEDURE — 85025 COMPLETE CBC W/AUTO DIFF WBC: CPT

## 2024-02-08 PROCEDURE — 84484 ASSAY OF TROPONIN QUANT: CPT

## 2024-02-08 PROCEDURE — 96365 THER/PROPH/DIAG IV INF INIT: CPT

## 2024-02-08 PROCEDURE — 96366 THER/PROPH/DIAG IV INF ADDON: CPT

## 2024-02-08 PROCEDURE — 80053 COMPREHEN METABOLIC PANEL: CPT

## 2024-02-08 PROCEDURE — 71046 X-RAY EXAM CHEST 2 VIEWS: CPT

## 2024-02-08 PROCEDURE — 96361 HYDRATE IV INFUSION ADD-ON: CPT

## 2024-02-08 PROCEDURE — 99284 EMERGENCY DEPT VISIT MOD MDM: CPT

## 2024-02-08 PROCEDURE — 93005 ELECTROCARDIOGRAM TRACING: CPT

## 2024-02-08 PROCEDURE — 83605 ASSAY OF LACTIC ACID: CPT

## 2024-02-08 RX ADMIN — POTASSIUM CHLORIDE SCH MLS/HR: 7.46 INJECTION, SOLUTION INTRAVENOUS at 21:48

## 2024-02-08 RX ADMIN — MIDODRINE HYDROCHLORIDE STA MG: 5 TABLET ORAL at 21:48

## 2024-02-08 RX ADMIN — CEFAZOLIN ONE MLS/HR: 330 INJECTION, POWDER, FOR SOLUTION INTRAMUSCULAR; INTRAVENOUS at 20:03

## 2024-02-08 RX ADMIN — CEFAZOLIN ONE MLS/HR: 330 INJECTION, POWDER, FOR SOLUTION INTRAMUSCULAR; INTRAVENOUS at 21:48

## 2024-02-08 NOTE — ED
General Adult HPI





- General


Stated complaint: Syncope, Dizziness


Time Seen by Provider: 24 19:38


Source: patient, family


Mode of arrival: wheelchair


Limitations: no limitations





- History of Present Illness


Initial comments: 


58-year-old female presenting with chief complaint of weakness.  Patient states 

that has been getting dizzy when she stands up, states that this has been 

happening for a while.  She has been seen 3 times at [facility] for this 

issue.  She states that today when she went to stand up her legs felt like Jell-

O and her family members had to catch her.  No chest pain or difficulty 

breathing.  States that she has not been eating or drinking much today.  Patient

takes Motrin 10 mg twice daily and has not taken her evening dose.  No abdominal

pain, nausea, vomiting.  No headache, vision or hearing changes, numbness, 

tingling.








- Related Data


                                Home Medications











 Medication  Instructions  Recorded  Confirmed


 


Montelukast [Singulair] 10 mg PO DAILY 18


 


Mirabegron [Myrbetriq] 50 mg PO DAILY 20


 


Aspirin EC [Ecotrin Low Dose] 81 mg PO HS 21


 


Empagliflozin [Jardiance] 10 mg PO DAILY 22


 


Ergocalciferol [Vitamin D2 (1250 1,250 mcg PO SA 22





Mcg = 22386 Iu)]   


 


Midodrine HCl [ProAmatine] 10 mg PO TID-W/MEALS 22


 


Pioglitazone HCl 30 mg PO DAILY 22


 


traMADol HCL 50 mg PO Q6H PRN 22


 


Escitalopram [Lexapro] 20 mg PO DAILY 24


 


Insuln Asp Prt/Insulin Aspart 15 - 20 unit SQ HS 24





[NovoLOG MIX 70-30 VIAL]   


 


Insuln Asp Prt/Insulin Aspart 40 unit SQ DAILY 24





[NovoLOG MIX 70-30 VIAL]   


 


Omeprazole 40 mg PO DAILY 24


 


Pregabalin [Lyrica] 150 mg PO TID 24


 


Rosuvastatin [Crestor] 20 mg PO HS 24


 


Semaglutide [Ozempic] 1 mg SQ TU 24


 


lisinopriL [Zestril] 20 mg PO DAILY 24








                                  Previous Rx's











 Medication  Instructions  Recorded


 


Potassium Chloride ER [K-Dur 20] 20 meq PO DAILY #3 tab 24











                                    Allergies











Allergy/AdvReac Type Severity Reaction Status Date / Time


 


Sulfa (Sulfonamide Allergy  Rash, Verified 24 22:17





Antibiotics)   hives,  





   swelling  














Review of Systems


ROS Statement: 


Those systems with pertinent positive or pertinent negative responses have been 

documented in the HPI.





ROS Other: All systems not noted in ROS Statement are negative.





Past Medical History


Past Medical History: Diabetes Mellitus, Eye Disorder, Hyperlipidemia, 

Hypertension


Additional Past Medical History / Comment(s): 2/25/15  Pt presented to [facility] ER 

with substernal chest pain that started 24 hhours before coming to ER.  She 

noticed the chest pain when she woke up yesterday.  It is a heaviness and is 

intermittent with variable duration.  She also states she had alittle nausea 

with it.   Other HX:  Pt had recent (1/21/15)cataract surgery with lens implants

bilaterally at [place].  Hospital-post op she had low O2 saturations and was 

told she had a very narrow airway-instead of going home same day, she was in the

hospital for a couple days until her saturations improved.  She was discharged 

with home O2 which she wears at 2L/NC at [facility] and was to follow up today for te

sting for sleep apnea.  Pt states she also has diarrhea fairly frequently. Also 

has hx of 3 ruptured cervical discs with surgery and bilateral retina repair.  

2018 - intermittently in hospital for 3 months for fluid on lungs, had fluid 

removed


History of Any Multi-Drug Resistant Organisms: MRSA


Date of last positivie culture/infection: 


MDRO Source:: legs and breasts.


Past Surgical History: Bariatric Surgery, Cholecystectomy, Hysterectomy, 

Orthopedic Surgery


Additional Past Surgical History / Comment(s): Bilateral cataract sx with lens 

implants, bilateral retinal repair. Cervical rodding for 3 ruptured discs.  

sleeve gastrectomy         3-1-21


Past Anesthesia/Blood Transfusion Reactions: Postoperative Nausea & Vomiting 

(PONV)


Additional Past Anesthesia/Blood Transfusion Reaction / Comment(s): Pt had 

cataract and lens implant at [facility] on 1/21/15 and afterwards she 

desaturated and was hospitalized.  She was told she has a very narrow airway.  

Pt has never recieved blood.


Past Psychological History: No Psychological Hx Reported


Smoking Status: Former smoker


Past Alcohol Use History: None Reported


Past Drug Use History: None Reported





- Past Family History


  ** Father


Family Medical History: Cancer, Myocardial Infarction (MI)


Additional Family Medical History / Comment(s): Father had 5 MI's and  of 

bone cancer.





  ** Mother


Family Medical History: Cancer


Additional Family Medical History / Comment(s): cervical cancer





General Exam





- General Exam Comments


Initial Comments: 


Visual Physical Exam





Vital signs reviewed





General: Well-appearing, nontoxic, no acute distress.


Head: Normocephalic, atraumatic


Eyes: PERRLA, EOMI


ENT: Airway patent


Chest: Nonlabored breathing


Skin: No visual rash, normal skin tone


Neuro: Alert and oriented 3


Musculoskeletal: No gross abnormalities





Limitations: no limitations


General appearance: alert, in no apparent distress


Head exam: Present: atraumatic, normocephalic


Eye exam: Present: normal appearance, PERRL, EOMI


Neck exam: Present: normal inspection


Respiratory exam: Present: normal lung sounds bilaterally.  Absent: respiratory 

distress, wheezes, rales, rhonchi, stridor


Cardiovascular Exam: Present: regular rate, normal rhythm, normal heart sounds. 

Absent: systolic murmur, diastolic murmur, rubs, gallop, clicks


Extremities exam: Present: normal inspection, full ROM


Neurological exam: Present: alert, oriented X3


  ** Expanded


Patient oriented to: Present: person, place, time


Speech: Present: fluid speech


Cranial nerves: EOM's Intact: Normal


Sensory exam: Upper Extremity Light Touch: Normal, Lower Extremity Light Touch: 

Normal


Motor strength exam: RUE: 5, LUE: 5, RLE: 5, LLE: 5


Eye Response: (4) open spontaneously


Motor Response: (6) obeys commands


Verbal Response: (5) oriented


Chele Total: 15


Psychiatric exam: Present: normal affect, normal mood


Skin exam: Present: warm, dry





Course


                                   Vital Signs











  24





  19:37 20:06 20:08


 


Temperature 98 F  


 


Pulse Rate 82  


 


Pulse Rate [   84





Sitting Cardiac   





Monitor]   


 


Pulse Rate [   





Standing   





Cardiac Monitor   





]   


 


Pulse Rate [  74 





Supine Cardiac   





Monitor]   


 


Respiratory 18  





Rate   


 


Blood Pressure 91/57  


 


Blood Pressure   





[Right Arm   





Standing]   


 


Blood Pressure  97/51 87/52





[Right Arm   





Supine]   


 


O2 Sat by Pulse 100  





Oximetry   














  24





  20:10 21:50 23:11


 


Temperature   


 


Pulse Rate  86 95


 


Pulse Rate [   





Sitting Cardiac   





Monitor]   


 


Pulse Rate [ 87  





Standing   





Cardiac Monitor   





]   


 


Pulse Rate [   





Supine Cardiac   





Monitor]   


 


Respiratory  16 16





Rate   


 


Blood Pressure  100/63 122/80


 


Blood Pressure 68/43  





[Right Arm   





Standing]   


 


Blood Pressure   





[Right Arm   





Supine]   


 


O2 Sat by Pulse  100 98





Oximetry   














  24





  23:56


 


Temperature 98 F


 


Pulse Rate 78


 


Pulse Rate [ 





Sitting Cardiac 





Monitor] 


 


Pulse Rate [ 





Standing 





Cardiac Monitor 





] 


 


Pulse Rate [ 





Supine Cardiac 





Monitor] 


 


Respiratory 18





Rate 


 


Blood Pressure 120/66


 


Blood Pressure 





[Right Arm 





Standing] 


 


Blood Pressure 





[Right Arm 





Supine] 


 


O2 Sat by Pulse 98





Oximetry 














Medical Decision Making





- Medical Decision Making


Sinus rhythm ventricular rate 73.  KS interval 171.  .  .  QTc 431.

 Normal axis.





Was pt. sent in by a medical professional or institution (, PA, NP, urgent 

care, hospital, or nursing home...) When possible be specific


@  -No


Did you speak to anyone other than the patient for history (EMS, parent, family,

police, friend...)? What history was obtained from this source 


@  -History supplemented by family


Did you review nursing and triage notes (agree or disagree)?  Why? 


@  -I reviewed and agree with nursing and triage notes


Were old charts reviewed (outside hosp., previous admission, EMS record, old 

EKG, old radiological studies, urgent care reports/EKG's, nursing home records)?

Report findings 


@  -No old charts were reviewed


Differential Diagnosis (chest pain, altered mental status, abdominal pain women,

abdominal pain men, vaginal bleeding, weakness, fever, dyspnea, syncope, 

headache, dizziness, GI bleed, back pain, seizure, CVA, palpatations, mental 

health, musculoskeletal)? 


@  -MDM Differential Dizziness:


Benign paroxysmal positional Vertigo, Menieres disease, otitis media, 

acoustic neuroma, vertebrobasilar insufficiency, cerebellar stroke, 

encephalitis, hypovolemic, arrhythmia, coronary artery syndrome, anemia this 

is not meant to be an all-inclusive list





EKG interpreted by me (3pts min.).


@  -As above


X-rays interpreted by me (1pt min.).


@  -Chest x-ray shows no acute radiographic process


CT interpreted by me (1pt min.).


@  -None done


U/S interpreted by me (1pt. min.).


@  -None done


What testing was considered but not performed or refused? (CT, X-rays, U/S, 

labs)? Why?


@  -None


What meds were considered but not given or refused? Why?


@  -None


Did you discuss the management of the patient with other professionals 

(professionals i.e. , PA, NP, lab, RT, psych nurse, , , 

teacher, , )? Give summary


@  -No


Was smoking cessation discussed for >3mins.?


@  -No


Was critical care preformed (if so, how long)?


@  -No


Were there social determinants of health that impacted care today? How? 

(Homelessness, low income, unemployed, alcoholism, drug addiction, 

transportation, low edu. Level, literacy, decrease access to med. care, nursing home, 

rehab)?


@  -No


Was there de-escalation of care discussed even if they declined (Discuss DNR or 

withdrawal of care, Hospice)? DNR status


@  -No


What co-morbidities impacted this encounter? (DM, HTN, Smoking, COPD, CAD, 

Cancer, CVA, ARF, Chemo, Hep., AIDS, mental health diagnosis, sleep apnea, mo

rbid obesity)?


@  -None


Was patient admitted / discharged? Hospital course, mention meds given and r

oute, prescriptions, significant lab abnormalities, going to OR and other 

pertinent info.


@  -58-year-old female presenting with chief complaint of dizziness.  This has 

been an ongoing issue.  Patient states that she has not been eating much or 

drinking water today.  History and physical exam were conducted.  No focal 

neurological deficits.  Patient has positive orthostatic vitals.  She is given I

V fluid bolus and her evening dose of midodrine 10 mg.  Potassium 3.1, she is 

given a dose of potassium here.  BUN 26 and creatinine 1.24, receiving IV 

fluids.  Chest x-ray shows no acute process.  On reassessment patient's vital 

signs have improved, no further orthostatic hypotension.  Patient is educated on

today's findings.  I stressed the importance of hydration.  Sent short course of

potassium supplements at home.  Follow-up with PCP.  Report back to ER with any 

new or worsening symptoms.  Discussed return parameters and answered all 

questions.  Patient conveyed verbal understanding and agreed to the plan.  I 

discussed this case in detail with my attending Dr. Johnson


Undiagnosed new problem with uncertain prognosis?


@  -No


Drug Therapy requiring intensive monitoring for toxicity (Heparin, Nitro, 

Insulin, Cardizem)?


@  -No


Were any procedures done?


@  -No


Diagnosis/symptom?


@  -Dehydration, orthostatic hypotension


Acute, or Chronic, or Acute on Chronic?


@  -Acute


Uncomplicated (without systemic symptoms) or Complicated (systemic symptoms)?


@  -Complicated


Side effects of treatment?


@  -No


Exacerbation, Progression, or Severe Exacerbation?


@  -No


Poses a threat to life or bodily function? How? (Chest pain, USA, MI, pneumonia,

PE, COPD, DKA, ARF, appy, cholecystitis, CVA, Diverticulitis, Homicidal, 

Suicidal, threat to staff... and all critical care pts)


@  -At this time low likelihood











- Lab Data


Result diagrams: 


                                 24 19:53





                                 24 19:53


                                   Lab Results











  24 Range/Units





  19:53 19:53 19:53 


 


WBC  10.8 H    (3.8-10.6)  k/uL


 


RBC  4.45    (3.80-5.40)  m/uL


 


Hgb  12.8    (11.4-16.0)  gm/dL


 


Hct  36.1    (34.0-46.0)  %


 


MCV  81.2    (80.0-100.0)  fL


 


MCH  28.7    (25.0-35.0)  pg


 


MCHC  35.4    (31.0-37.0)  g/dL


 


RDW  14.4    (11.5-15.5)  %


 


Plt Count  242    (150-450)  k/uL


 


MPV  9.3    


 


Neutrophils %  46    %


 


Lymphocytes %  43    %


 


Monocytes %  5    %


 


Eosinophils %  3    %


 


Basophils %  1    %


 


Neutrophils #  4.9    (1.3-7.7)  k/uL


 


Lymphocytes #  4.7    (1.0-4.8)  k/uL


 


Monocytes #  0.5    (0-1.0)  k/uL


 


Eosinophils #  0.3    (0-0.7)  k/uL


 


Basophils #  0.1    (0-0.2)  k/uL


 


Sodium   138   (137-145)  mmol/L


 


Potassium   3.1 L   (3.5-5.1)  mmol/L


 


Chloride   103   ()  mmol/L


 


Carbon Dioxide   27   (22-30)  mmol/L


 


Anion Gap   8   mmol/L


 


BUN   26 H   (7-17)  mg/dL


 


Creatinine   1.24 H   (0.52-1.04)  mg/dL


 


Est GFR (CKD-EPI)AfAm   56   (>60 ml/min/1.73 sqM)  


 


Est GFR (CKD-EPI)NonAf   48   (>60 ml/min/1.73 sqM)  


 


Glucose   91   (74-99)  mg/dL


 


POC Glucose (mg/dL)     ()  mg/dL


 


POC Glu Operater ID     


 


Plasma Lactic Acid Dilan    1.1  (0.7-2.0)  mmol/L


 


Calcium   9.1   (8.4-10.2)  mg/dL


 


Total Bilirubin   0.4   (0.2-1.3)  mg/dL


 


AST   38 H   (14-36)  U/L


 


ALT   24   (4-34)  U/L


 


Alkaline Phosphatase   94   ()  U/L


 


Troponin I     (0.000-0.034)  ng/mL


 


Total Protein   7.1   (6.3-8.2)  g/dL


 


Albumin   4.2   (3.5-5.0)  g/dL














  24 Range/Units





  19:53 22:53 


 


WBC    (3.8-10.6)  k/uL


 


RBC    (3.80-5.40)  m/uL


 


Hgb    (11.4-16.0)  gm/dL


 


Hct    (34.0-46.0)  %


 


MCV    (80.0-100.0)  fL


 


MCH    (25.0-35.0)  pg


 


MCHC    (31.0-37.0)  g/dL


 


RDW    (11.5-15.5)  %


 


Plt Count    (150-450)  k/uL


 


MPV    


 


Neutrophils %    %


 


Lymphocytes %    %


 


Monocytes %    %


 


Eosinophils %    %


 


Basophils %    %


 


Neutrophils #    (1.3-7.7)  k/uL


 


Lymphocytes #    (1.0-4.8)  k/uL


 


Monocytes #    (0-1.0)  k/uL


 


Eosinophils #    (0-0.7)  k/uL


 


Basophils #    (0-0.2)  k/uL


 


Sodium    (137-145)  mmol/L


 


Potassium    (3.5-5.1)  mmol/L


 


Chloride    ()  mmol/L


 


Carbon Dioxide    (22-30)  mmol/L


 


Anion Gap    mmol/L


 


BUN    (7-17)  mg/dL


 


Creatinine    (0.52-1.04)  mg/dL


 


Est GFR (CKD-EPI)AfAm    (>60 ml/min/1.73 sqM)  


 


Est GFR (CKD-EPI)NonAf    (>60 ml/min/1.73 sqM)  


 


Glucose    (74-99)  mg/dL


 


POC Glucose (mg/dL)   90  ()  mg/dL


 


POC Glu Operater ID   Zhen Heredia  


 


Plasma Lactic Acid Dilan    (0.7-2.0)  mmol/L


 


Calcium    (8.4-10.2)  mg/dL


 


Total Bilirubin    (0.2-1.3)  mg/dL


 


AST    (14-36)  U/L


 


ALT    (4-34)  U/L


 


Alkaline Phosphatase    ()  U/L


 


Troponin I  <0.012   (0.000-0.034)  ng/mL


 


Total Protein    (6.3-8.2)  g/dL


 


Albumin    (3.5-5.0)  g/dL














Disposition


Clinical Impression: 


 Orthostatic hypotension, Dehydration





Disposition: HOME SELF-CARE


Condition: Good


Instructions (If sedation given, give patient instructions):  Dehydration (ED), 

Hypotension (ED)


Additional Instructions: 


Follow-up with PCP.  Report back to ER with any new or worsening symptoms.


Prescriptions: 


Potassium Chloride ER [K-Dur 20] 20 meq PO DAILY #3 tab


Is patient prescribed a controlled substance at d/c from ED?: No


Referrals: 


Emily Capps MD [Primary Care Provider] - 1-2 days


Time of Disposition: 23:44

## 2024-02-08 NOTE — XR
EXAMINATION: XR chest 2V:  2/8/2024 8:55 PM

 

CLINICAL INDICATION: dizziness

 

TECHNIQUE: Departmental protocol

 

COMPARISON: 3/29/2023

 

FINDINGS: 

EKG leads.

 

The lungs are clear. 

 

The pleural spaces are negative.

 

The cardiac silhouette is not enlarged. The remainder of the mediastinal silhouette is unremarkable. 


 

The skeletal structures and soft tissues are negative for acute findings.

 

 

IMPRESSION:

No acute radiographic process.

## 2024-02-09 VITALS — RESPIRATION RATE: 18 BRPM | DIASTOLIC BLOOD PRESSURE: 66 MMHG | SYSTOLIC BLOOD PRESSURE: 120 MMHG | HEART RATE: 78 BPM

## 2024-04-30 ENCOUNTER — HOSPITAL ENCOUNTER (OUTPATIENT)
Dept: HOSPITAL 47 - EC | Age: 59
Setting detail: OBSERVATION
LOS: 2 days | Discharge: HOME | End: 2024-05-02
Attending: INTERNAL MEDICINE | Admitting: INTERNAL MEDICINE
Payer: MEDICARE

## 2024-04-30 DIAGNOSIS — N17.9: ICD-10-CM

## 2024-04-30 DIAGNOSIS — E11.65: ICD-10-CM

## 2024-04-30 DIAGNOSIS — I10: ICD-10-CM

## 2024-04-30 DIAGNOSIS — F32.A: ICD-10-CM

## 2024-04-30 DIAGNOSIS — R26.9: ICD-10-CM

## 2024-04-30 DIAGNOSIS — R82.90: ICD-10-CM

## 2024-04-30 DIAGNOSIS — R42: Primary | ICD-10-CM

## 2024-04-30 DIAGNOSIS — Z87.891: ICD-10-CM

## 2024-04-30 DIAGNOSIS — E87.6: ICD-10-CM

## 2024-04-30 DIAGNOSIS — G89.4: ICD-10-CM

## 2024-04-30 DIAGNOSIS — E66.01: ICD-10-CM

## 2024-04-30 DIAGNOSIS — N39.0: ICD-10-CM

## 2024-04-30 DIAGNOSIS — J32.0: ICD-10-CM

## 2024-04-30 DIAGNOSIS — R32: ICD-10-CM

## 2024-04-30 DIAGNOSIS — Z88.2: ICD-10-CM

## 2024-04-30 DIAGNOSIS — Z79.82: ICD-10-CM

## 2024-04-30 DIAGNOSIS — K21.9: ICD-10-CM

## 2024-04-30 DIAGNOSIS — Z79.899: ICD-10-CM

## 2024-04-30 DIAGNOSIS — E78.5: ICD-10-CM

## 2024-04-30 DIAGNOSIS — E11.42: ICD-10-CM

## 2024-04-30 DIAGNOSIS — Z79.4: ICD-10-CM

## 2024-04-30 DIAGNOSIS — Z79.85: ICD-10-CM

## 2024-04-30 DIAGNOSIS — Z79.84: ICD-10-CM

## 2024-04-30 LAB
ALBUMIN SERPL-MCNC: 4.2 G/DL (ref 3.5–5)
ALP SERPL-CCNC: 100 U/L (ref 38–126)
ALT SERPL-CCNC: 19 U/L (ref 4–34)
ANION GAP SERPL CALC-SCNC: 7 MMOL/L
APTT BLD: 22.2 SEC (ref 22–30)
AST SERPL-CCNC: 25 U/L (ref 14–36)
BASOPHILS # BLD AUTO: 0.1 K/UL (ref 0–0.2)
BASOPHILS NFR BLD AUTO: 1 %
BUN SERPL-SCNC: 19 MG/DL (ref 7–17)
CALCIUM SPEC-MCNC: 9 MG/DL (ref 8.4–10.2)
CHLORIDE SERPL-SCNC: 101 MMOL/L (ref 98–107)
CO2 SERPL-SCNC: 31 MMOL/L (ref 22–30)
EOSINOPHIL # BLD AUTO: 0.2 K/UL (ref 0–0.7)
EOSINOPHIL NFR BLD AUTO: 2 %
ERYTHROCYTE [DISTWIDTH] IN BLOOD BY AUTOMATED COUNT: 4.51 M/UL (ref 3.8–5.4)
ERYTHROCYTE [DISTWIDTH] IN BLOOD: 14.9 % (ref 11.5–15.5)
GLUCOSE BLD-MCNC: 275 MG/DL (ref 70–110)
GLUCOSE SERPL-MCNC: 65 MG/DL (ref 74–99)
HCT VFR BLD AUTO: 37.9 % (ref 34–46)
HGB BLD-MCNC: 12.8 GM/DL (ref 11.4–16)
INR PPP: 0.8 (ref ?–1.2)
LYMPHOCYTES # SPEC AUTO: 4.1 K/UL (ref 1–4.8)
LYMPHOCYTES NFR SPEC AUTO: 32 %
MAGNESIUM SPEC-SCNC: 2 MG/DL (ref 1.6–2.3)
MCH RBC QN AUTO: 28.4 PG (ref 25–35)
MCHC RBC AUTO-ENTMCNC: 33.8 G/DL (ref 31–37)
MCV RBC AUTO: 84 FL (ref 80–100)
MONOCYTES # BLD AUTO: 0.9 K/UL (ref 0–1)
MONOCYTES NFR BLD AUTO: 7 %
NEUTROPHILS # BLD AUTO: 7 K/UL (ref 1.3–7.7)
NEUTROPHILS NFR BLD AUTO: 55 %
PLATELET # BLD AUTO: 252 K/UL (ref 150–450)
POTASSIUM SERPL-SCNC: 3 MMOL/L (ref 3.5–5.1)
PROT SERPL-MCNC: 7.1 G/DL (ref 6.3–8.2)
PT BLD: 9.6 SEC (ref 10–12.5)
SODIUM SERPL-SCNC: 139 MMOL/L (ref 137–145)
WBC # BLD AUTO: 12.8 K/UL (ref 3.8–10.6)

## 2024-04-30 PROCEDURE — 83735 ASSAY OF MAGNESIUM: CPT

## 2024-04-30 PROCEDURE — 71046 X-RAY EXAM CHEST 2 VIEWS: CPT

## 2024-04-30 PROCEDURE — 93005 ELECTROCARDIOGRAM TRACING: CPT

## 2024-04-30 PROCEDURE — 85025 COMPLETE CBC W/AUTO DIFF WBC: CPT

## 2024-04-30 PROCEDURE — 80053 COMPREHEN METABOLIC PANEL: CPT

## 2024-04-30 PROCEDURE — 96361 HYDRATE IV INFUSION ADD-ON: CPT

## 2024-04-30 PROCEDURE — 93306 TTE W/DOPPLER COMPLETE: CPT

## 2024-04-30 PROCEDURE — 93880 EXTRACRANIAL BILAT STUDY: CPT

## 2024-04-30 PROCEDURE — 96360 HYDRATION IV INFUSION INIT: CPT

## 2024-04-30 PROCEDURE — 85730 THROMBOPLASTIN TIME PARTIAL: CPT

## 2024-04-30 PROCEDURE — 36415 COLL VENOUS BLD VENIPUNCTURE: CPT

## 2024-04-30 PROCEDURE — 81001 URINALYSIS AUTO W/SCOPE: CPT

## 2024-04-30 PROCEDURE — 99285 EMERGENCY DEPT VISIT HI MDM: CPT

## 2024-04-30 PROCEDURE — 84484 ASSAY OF TROPONIN QUANT: CPT

## 2024-04-30 PROCEDURE — 70450 CT HEAD/BRAIN W/O DYE: CPT

## 2024-04-30 PROCEDURE — 85610 PROTHROMBIN TIME: CPT

## 2024-04-30 RX ADMIN — CEFAZOLIN SCH MLS/HR: 330 INJECTION, POWDER, FOR SOLUTION INTRAMUSCULAR; INTRAVENOUS at 22:59

## 2024-04-30 RX ADMIN — POTASSIUM CHLORIDE STA MEQ: 20 TABLET, EXTENDED RELEASE ORAL at 21:48

## 2024-04-30 RX ADMIN — CEFAZOLIN STA MLS/HR: 330 INJECTION, POWDER, FOR SOLUTION INTRAMUSCULAR; INTRAVENOUS at 21:49

## 2024-04-30 RX ADMIN — ACETAMINOPHEN STA MG: 500 TABLET ORAL at 21:47

## 2024-04-30 NOTE — XR
EXAMINATION TYPE: XR chest 2V

 

DATE OF EXAM: 4/30/2024

 

COMPARISON: 2/8/2024

 

INDICATION: Dizziness headache

 

TECHNIQUE:  Frontal and lateral views of the chest are obtained.

 

FINDINGS:  

The heart size is normal.  

The pulmonary vasculature is normal.

Minimal platelike atelectasis at the left base. Lungs are otherwise clear.

 

IMPRESSION:  

1. Minimal left basilar plate atelectasis

## 2024-04-30 NOTE — ED
General Adult HPI





- General


Stated complaint: Headache, Dizziness


Time Seen by Provider: 24 16:48


Source: RN notes reviewed





- History of Present Illness


Initial comments: 


59-year-old female with a past medical history significant for hypertension, 

hyperlipidemia, diabetes presenting to the ED with complaints of headache and 

dizziness.  Patient reports that this has been ongoing for "months".  Reports 

over the last few days seeming to worsen.  States that when she walks she leans 

to the left secondary to her symptoms.  Reports that whenever she gets dizzy she

also has associated headache.  Denies chest pain or shortness of breath.  No 

fever or chills.  No other complaints at this time.





- Related Data


                                Home Medications











 Medication  Instructions  Recorded  Confirmed


 


Montelukast [Singulair] 10 mg PO DAILY 18


 


Mirabegron [Myrbetriq] 50 mg PO DAILY 20


 


Aspirin EC [Ecotrin Low Dose] 81 mg PO HS 21


 


Empagliflozin [Jardiance] 10 mg PO DAILY 22


 


Ergocalciferol [Vitamin D2 (1250 1,250 mcg PO SA 22





Mcg = 97766 Iu)]   


 


Midodrine HCl [ProAmatine] 10 mg PO TID-W/MEALS 22


 


Pioglitazone HCl 30 mg PO DAILY 22


 


traMADol HCL 50 mg PO Q6H PRN 22


 


Escitalopram [Lexapro] 20 mg PO DAILY 24


 


Insuln Asp Prt/Insulin Aspart 15 - 20 unit SQ HS 24





[NovoLOG MIX 70-30 VIAL]   


 


Insuln Asp Prt/Insulin Aspart 40 unit SQ DAILY 24





[NovoLOG MIX 70-30 VIAL]   


 


Omeprazole 40 mg PO DAILY 24


 


Pregabalin [Lyrica] 150 mg PO TID 24


 


Rosuvastatin [Crestor] 20 mg PO HS 24


 


Semaglutide [Ozempic] 1 mg SQ TU 24


 


lisinopriL [Zestril] 20 mg PO DAILY 24








                                  Previous Rx's











 Medication  Instructions  Recorded


 


Potassium Chloride ER [K-Dur 20] 20 meq PO DAILY #3 tab 24











                                    Allergies











Allergy/AdvReac Type Severity Reaction Status Date / Time


 


Sulfa (Sulfonamide Allergy  Rash, Verified 24 16:56





Antibiotics)   hives,  





   swelling  














Review of Systems


ROS Statement: 


Those systems with pertinent positive or pertinent negative responses have been 

documented in the HPI.





ROS Other: All systems not noted in ROS Statement are negative.





Past Medical History


Past Medical History: Diabetes Mellitus, Eye Disorder, Hyperlipidemia, 

Hypertension


Additional Past Medical History / Comment(s): 2/25/15  Pt presented to [facility] ER 

with substernal chest pain that started 24 hhours before coming to ER.  She 

noticed the chest pain when she woke up yesterday.  It is a heaviness and is 

intermittent with variable duration.  She also states she had alittle nausea 

with it.   Other HX:  Pt had recent (1/21/15)cataract surgery with lens implants

bilaterally at [facility].  Hospital-post op she had low O2 saturations and was 

told she had a very narrow airway-instead of going home same day, she was in the

hospital for a couple days until her saturations improved.  She was discharged 

with home O2 which she wears at 2L/NC at [facility] and was to follow up today for 

testing for sleep apnea.  Pt states she also has diarrhea fairly frequently. 

Also has hx of 3 ruptured cervical discs with surgery and bilateral retina 

repair.  2018 - intermittently in hospital for 3 months for fluid on lungs, had 

fluid removed


History of Any Multi-Drug Resistant Organisms: MRSA


Date of last positivie culture/infection: 


MDRO Source:: legs and breasts.


Past Surgical History: Bariatric Surgery, Cholecystectomy, Hysterectomy, 

Orthopedic Surgery


Additional Past Surgical History / Comment(s): Bilateral cataract sx with lens 

implants, bilateral retinal repair. Cervical rodding for 3 ruptured discs.  

sleeve gastrectomy         3-1-21


Past Anesthesia/Blood Transfusion Reactions: Postoperative Nausea & Vomiting 

(PONV)


Additional Past Anesthesia/Blood Transfusion Reaction / Comment(s): Pt had 

cataract and lens implant at [facility] on 1/21/15 and afterwards she 

desaturated and was hospitalized.  She was told she has a very narrow airway.  

Pt has never recieved blood.


Past Psychological History: No Psychological Hx Reported


Smoking Status: Former smoker


Past Alcohol Use History: None Reported


Past Drug Use History: None Reported





- Past Family History


  ** Father


Family Medical History: Cancer, Myocardial Infarction (MI)


Additional Family Medical History / Comment(s): Father had 5 MI's and  of 

bone cancer.





  ** Mother


Family Medical History: Cancer


Additional Family Medical History / Comment(s): cervical cancer





General Exam





- General Exam Comments


Initial Comments: 


Visual Physical Exam





Vital signs reviewed





General: Well-appearing, nontoxic, no acute distress.


Head: Normocephalic, atraumatic


Eyes: PERRLA, EOMI


ENT: Airway patent


Chest: Nonlabored breathing


Skin: No visual rash, normal skin tone


Neuro: Alert and oriented 3


Musculoskeletal: No gross abnormalities





General appearance: alert, in no apparent distress


Eye exam: Present: normal appearance, PERRL, EOMI


Neck exam: Present: normal inspection


Respiratory exam: Present: normal lung sounds bilaterally


Cardiovascular Exam: Present: regular rate


GI/Abdominal exam: Present: soft


Neurological exam: Present: alert, oriented X3, CN II-XII intact, abnormal gait 

(Ambulates to the left), other (Finger-to-nose, heel-to-shin, rapid alternating 

hand movements intact.)


Skin exam: Present: warm, dry





Course


                                   Vital Signs











  24





  16:45 21:21


 


Temperature 97.4 F L 


 


Pulse Rate 74 74


 


Respiratory 18 18





Rate  


 


Blood Pressure 87/56 126/70


 


O2 Sat by Pulse 99 98





Oximetry  














Medical Decision Making





- Medical Decision Making


Quicknote portion performed. Signed Alvin Kelly PA-C





Was pt. sent in by a medical professional or institution (APOORVA Maher, NP, urgent 

care, hospital, or nursing home...) When possible be specific


@  -No


Did you speak to anyone other than the patient for history (EMS, parent, family,

police, friend...)? What history was obtained from this source 


@  -No


Did you review nursing and triage notes (agree or disagree)?  Why? 


@  -I reviewed and agree with nursing and triage notes


Were old charts reviewed (outside hosp., previous admission, EMS record, old 

EKG, old radiological studies, urgent care reports/EKG's, nursing home records)?

Report findings 


@  -No old charts were reviewed


Differential Diagnosis (chest pain, altered mental status, abdominal pain women,

abdominal pain men, vaginal bleeding, weakness, fever, dyspnea, syncope, 

headache, dizziness, GI bleed, back pain, seizure, CVA, palpatations, mental 

health, musculoskeletal)? 


@  -Differential Dizziness:


Benign paroxysmal positional Vertigo, Menieres disease, otitis media, acoustic 

neuroma, vertebrobasilar insufficiency, cerebellar stroke, encephalitis, 

hypovolemic, arrhythmia, coronary artery syndrome, anemia, this is not meant to 

be an all-inclusive list


EKG interpreted by me (3pts min.).


@  -EKG interpreted me showing a sinus rhythm with nonspecific findings.  Rate 

of 72 bpm.  , , QT/QTc 408/432.


X-rays interpreted by me (1pt min.).


@  -None done


CT interpreted by me (1pt min.).


@  -CT brain interpreted me which revealed no evidence of acute finding.


U/S interpreted by me (1pt. min.).


@  -None done


What testing was considered but not performed or refused? (CT, X-rays, U/S, 

labs)? Why?


@  -None


What meds were considered but not given or refused? Why?


@  -None


Did you discuss the management of the patient with other professionals 

(professionals i.e. , PA, NP, lab, RT, psych nurse, , , 

teacher, , )? Give summary


@  -Case discussed with Dr. Villalobos, who accepts admission


Was smoking cessation discussed for >3mins.?


@  -No


Was critical care preformed (if so, how long)?


@  -No


Were there social determinants of health that impacted care today? How? (Home

lessness, low income, unemployed, alcoholism, drug addiction, transportation, 

low edu. Level, literacy, decrease access to med. care, group home, rehab)?


@  -No


Was there de-escalation of care discussed even if they declined (Discuss DNR or 

withdrawal of care, Hospice)? DNR status


@  -No


What co-morbidities impacted this encounter? (DM, HTN, Smoking, COPD, CAD, 

Cancer, CVA, ARF, Chemo, Hep., AIDS, mental health diagnosis, sleep apnea, 

morbid obesity)?


@  -Obesity, diabetes, hyperlipidemia, hypertension


Was patient admitted / discharged? Hospital course, mention meds given and 

route, prescriptions, significant lab abnormalities, going to OR and other 

pertinent info.


@  -Admission





59-year-old female presented to the ED with complaints of dizziness ongoing for 

the past few months however recently worsening over the past 3 days.  Laboratory

studies reviewed.  Chemistry panel does show hypokalemia at 3.  This was 

repleted.  CT of the brain revealed no evidence of acute finding.  Patient will 

be admitted to observation with consult to neurology secondary to the dizziness 

and difficulty ambulating.


Undiagnosed new problem with uncertain prognosis?


@  -No


Drug Therapy requiring intensive monitoring for toxicity (Heparin, Nitro, 

Insulin, Cardizem)?


@  -No


Were any procedures done?


@  -No


Diagnosis/symptom?


@  -Dizziness, difficulty ambulating


Acute, or Chronic, or Acute on Chronic?


@  -Acute on chronic


Uncomplicated (without systemic symptoms) or Complicated (systemic symptoms)?


@  -Complicated


Side effects of treatment?


@  -No


Exacerbation, Progression, or Severe Exacerbation?


@  -No


Poses a threat to life or bodily function? How? (Chest pain, USA, MI, pneumonia,

PE, COPD, DKA, ARF, appy, cholecystitis, CVA, Diverticulitis, Homicidal, 

Suicidal, threat to staff... and all critical care pts)


@  -Possibly, however unlikely at this time.








- Lab Data


Result diagrams: 


                                 24 17:04





                                 24 17:04


                                   Lab Results











  24 Range/Units





  17:04 17:04 17:04 


 


WBC  12.8 H    (3.8-10.6)  k/uL


 


RBC  4.51    (3.80-5.40)  m/uL


 


Hgb  12.8    (11.4-16.0)  gm/dL


 


Hct  37.9    (34.0-46.0)  %


 


MCV  84.0    (80.0-100.0)  fL


 


MCH  28.4    (25.0-35.0)  pg


 


MCHC  33.8    (31.0-37.0)  g/dL


 


RDW  14.9    (11.5-15.5)  %


 


Plt Count  252    (150-450)  k/uL


 


MPV  9.4    


 


Neutrophils %  55    %


 


Lymphocytes %  32    %


 


Monocytes %  7    %


 


Eosinophils %  2    %


 


Basophils %  1    %


 


Neutrophils #  7.0    (1.3-7.7)  k/uL


 


Lymphocytes #  4.1    (1.0-4.8)  k/uL


 


Monocytes #  0.9    (0-1.0)  k/uL


 


Eosinophils #  0.2    (0-0.7)  k/uL


 


Basophils #  0.1    (0-0.2)  k/uL


 


PT   9.6 L   (10.0-12.5)  sec


 


INR   0.8   (<1.2)  


 


APTT   22.2   (22.0-30.0)  sec


 


Sodium    139  (137-145)  mmol/L


 


Potassium    3.0 L  (3.5-5.1)  mmol/L


 


Chloride    101  ()  mmol/L


 


Carbon Dioxide    31 H  (22-30)  mmol/L


 


Anion Gap    7  mmol/L


 


BUN    19 H  (7-17)  mg/dL


 


Creatinine    1.30 H  (0.52-1.04)  mg/dL


 


Est GFR (CKD-EPI)AfAm    52  (>60 ml/min/1.73 sqM)  


 


Est GFR (CKD-EPI)NonAf    46  (>60 ml/min/1.73 sqM)  


 


Glucose    65 L  (74-99)  mg/dL


 


Calcium    9.0  (8.4-10.2)  mg/dL


 


Magnesium    2.0  (1.6-2.3)  mg/dL


 


Total Bilirubin    0.4  (0.2-1.3)  mg/dL


 


AST    25  (14-36)  U/L


 


ALT    19  (4-34)  U/L


 


Alkaline Phosphatase    100  ()  U/L


 


Troponin I     (0.000-0.034)  ng/mL


 


Total Protein    7.1  (6.3-8.2)  g/dL


 


Albumin    4.2  (3.5-5.0)  g/dL














  24 Range/Units





  17:04 


 


WBC   (3.8-10.6)  k/uL


 


RBC   (3.80-5.40)  m/uL


 


Hgb   (11.4-16.0)  gm/dL


 


Hct   (34.0-46.0)  %


 


MCV   (80.0-100.0)  fL


 


MCH   (25.0-35.0)  pg


 


MCHC   (31.0-37.0)  g/dL


 


RDW   (11.5-15.5)  %


 


Plt Count   (150-450)  k/uL


 


MPV   


 


Neutrophils %   %


 


Lymphocytes %   %


 


Monocytes %   %


 


Eosinophils %   %


 


Basophils %   %


 


Neutrophils #   (1.3-7.7)  k/uL


 


Lymphocytes #   (1.0-4.8)  k/uL


 


Monocytes #   (0-1.0)  k/uL


 


Eosinophils #   (0-0.7)  k/uL


 


Basophils #   (0-0.2)  k/uL


 


PT   (10.0-12.5)  sec


 


INR   (<1.2)  


 


APTT   (22.0-30.0)  sec


 


Sodium   (137-145)  mmol/L


 


Potassium   (3.5-5.1)  mmol/L


 


Chloride   ()  mmol/L


 


Carbon Dioxide   (22-30)  mmol/L


 


Anion Gap   mmol/L


 


BUN   (7-17)  mg/dL


 


Creatinine   (0.52-1.04)  mg/dL


 


Est GFR (CKD-EPI)AfAm   (>60 ml/min/1.73 sqM)  


 


Est GFR (CKD-EPI)NonAf   (>60 ml/min/1.73 sqM)  


 


Glucose   (74-99)  mg/dL


 


Calcium   (8.4-10.2)  mg/dL


 


Magnesium   (1.6-2.3)  mg/dL


 


Total Bilirubin   (0.2-1.3)  mg/dL


 


AST   (14-36)  U/L


 


ALT   (4-34)  U/L


 


Alkaline Phosphatase   ()  U/L


 


Troponin I  <0.012  (0.000-0.034)  ng/mL


 


Total Protein   (6.3-8.2)  g/dL


 


Albumin   (3.5-5.0)  g/dL














Disposition


Clinical Impression: 


 Dizziness, Difficulty in walking





Disposition: ADMITTED AS IP TO THIS HOSP


Condition: Good


Referrals: 


Emily Capps MD [Primary Care Provider] - 1-2 days


Time of Disposition: 21:30

## 2024-04-30 NOTE — CT
EXAMINATION TYPE: CT brain wo con

 

DATE OF EXAM: 4/30/2024

 

COMPARISON:

 

INDICATION: dizziness

 

DLP: 1047.1 mGycm, Automated exposure control for dose reduction was used.

 

CONTRAST: None

 

CT of the brain is performed utilizing 3 mm thick sections through the posterior fossa and 3 mm thick
 sections through the remaining calvarium.  Study is performed within 24 hours of arrival to the hosp
ital. 

 

No abnormal hyperdensity is present to suggest an acute intracranial hemorrhage.

No mass lesion is evident.

No acute infarcts are evident.

Ventricles and sulci are appropriate for the patient age.  

Minimal air-fluid level may be within the right maxillary sinus. Correlate for acute right maxillary 
sinusitis. Paranasal sinuses are otherwise clear. Mastoid air cells are clear.

 

IMPRESSION:

1.   No acute intracranial process. Follow-up MRI can be performed as clinically indicated.

2. Clinical consideration for acute right maxillary sinusitis is recommended.

## 2024-05-01 LAB
GLUCOSE BLD-MCNC: 170 MG/DL (ref 70–110)
GLUCOSE BLD-MCNC: 202 MG/DL (ref 70–110)
GLUCOSE BLD-MCNC: 230 MG/DL (ref 70–110)
GLUCOSE BLD-MCNC: 425 MG/DL (ref 70–110)
GLUCOSE UR QL: (no result)
PH UR: 5.5 [PH] (ref 5–8)
PROT UR QL: (no result)
RBC UR QL: 10 /HPF (ref 0–5)
SP GR UR: 1.02 (ref 1–1.03)
SQUAMOUS UR QL AUTO: 4 /HPF (ref 0–4)
UROBILINOGEN UR QL STRIP: <2 MG/DL (ref ?–2)
WBC #/AREA URNS HPF: >182 /HPF (ref 0–5)

## 2024-05-01 RX ADMIN — INSULIN ASPART SCH UNIT: 100 INJECTION, SOLUTION INTRAVENOUS; SUBCUTANEOUS at 09:36

## 2024-05-01 RX ADMIN — ATORVASTATIN CALCIUM SCH MG: 40 TABLET, FILM COATED ORAL at 22:18

## 2024-05-01 RX ADMIN — MONTELUKAST SODIUM SCH MG: 10 TABLET, FILM COATED ORAL at 09:40

## 2024-05-01 RX ADMIN — AMOXICILLIN AND CLAVULANATE POTASSIUM SCH EACH: 875; 125 TABLET, FILM COATED ORAL at 09:39

## 2024-05-01 RX ADMIN — INSULIN ASPART SCH UNIT: 100 INJECTION, SUSPENSION SUBCUTANEOUS at 22:18

## 2024-05-01 RX ADMIN — POTASSIUM CHLORIDE SCH MEQ: 20 TABLET, EXTENDED RELEASE ORAL at 09:41

## 2024-05-01 RX ADMIN — ASPIRIN SCH: 325 TABLET ORAL at 09:41

## 2024-05-01 RX ADMIN — POTASSIUM CHLORIDE SCH MLS/HR: 14.9 INJECTION, SOLUTION INTRAVENOUS at 06:54

## 2024-05-01 RX ADMIN — INSULIN ASPART SCH UNIT: 100 INJECTION, SUSPENSION SUBCUTANEOUS at 10:56

## 2024-05-01 RX ADMIN — ASPIRIN 81 MG CHEWABLE TABLET SCH MG: 81 TABLET CHEWABLE at 22:18

## 2024-05-01 RX ADMIN — PANTOPRAZOLE SODIUM SCH MG: 40 TABLET, DELAYED RELEASE ORAL at 09:39

## 2024-05-01 RX ADMIN — LORATADINE SCH MG: 10 TABLET ORAL at 09:40

## 2024-05-01 RX ADMIN — DAPAGLIFLOZIN SCH MG: 5 TABLET, FILM COATED ORAL at 09:39

## 2024-05-01 RX ADMIN — ESCITALOPRAM OXALATE SCH MG: 20 TABLET, FILM COATED ORAL at 09:40

## 2024-05-01 NOTE — P.CNNES
History of Present Illness


Consult date: 24


Requesting physician: Alvin Kelly


Reason for Consult: Dizziness, difficulty ambulating


History of Present Illness: 





Patient is a 59-year-old female with history of diabetes, came to the hospital 

yesterday at 4:09 PM for dizziness/lightheadedness.  Patient has been having 

some dizzy spells off and on for last 1 year.  Sometimes weeks would pass 

without having 1 and then it pops up on her.  Just in the last 3 days it is 

occurring every day consistently.  The dizziness only occurs when she gets up on

her feet, but it lasts somewhere between few seconds to a minute or 2.  

Sometimes she has to hold onto something until it passes, or if she sits down 

and it goes away.  Sometimes her legs want to give out and wants to go on her 

knees until she grabs onto stuff.  Patient states that she did pass out once 

about 5 months ago just for a brief moment.  Patient denies any dizziness on 

rolling over in the bed, looking upwards or bending down.  It never happens when

she is laying in the bed or sitting.  She describes it as lightheadedness, no 

vertigo.  Patient denies any tinnitus, loss of hearing, any polyps or pressure 

in the ears.





Vital signs on arrival blood pressure 87/56, pulse rate 74 temperature 97.4.  

Subsequent blood pressure was better 126/70.  Blood test shows WBC 12.8 

hemoglobin 12.8.  Platelets are normal.  PT PTT normal, sodium normal potassium 

3.0, BUN 19, creatinine 1.30.  Hepatic panel is normal.  Troponin negative.  UA 

shows large amount of leukocyte esterase and more than 182 WBCs and moderate WBC

clumps and negative nitrite.  Chest x-ray showed minimal left basilar plate 

atelectasis.  CT head revealed no acute intracranial process.  Follow-up MRI can

be performed as clinically indicated.  Clinical consideration for acute right 

maxillary sinusitis.  I personally reviewed CT head, agree with the findings, 

very small air-fluid level in the right maxillary sinus.  Patient does admit to 

having sinus congestion lately.





2D echo revealed normal left ventricular EF 55 to 60%.  Mildly increased septal 

wall thickness.  Mildly increased posterior wall thickness.  No obvious regional

wall motion abnormalities.  Mild right atrial dilation.  Mildly increased left 

atrial volume.  Aortic valve was poorly visualized but appears to be extremely 

calcified.  No significant gradient was identified across aortic valve but the 

gradient could be underestimated.  We will defer to IM to address this.





Patient has history of diabetes for 20 years, which is controlled.  She has 

smoked half pack per day for 5 years, quit 15 years ago.  Denies any alcohol 

use.  Patient does have diabetic peripheral neuropathy.  She has diabetic 

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





Review of Systems


Constitutional: Denies chills, Denies fever


Eyes: right loss of vision (From diabetic retinopathy), denies blurred vision, 

denies diplopia, denies pain


Ears: deny: decreased hearing, ear discharge, earache, tinnitus


Ears, nose, mouth and throat: Reports nasal congestion, Denies headache, Denies 

sore throat, Denies vertigo


Cardiovascular: Denies chest pain, Denies shortness of breath


Respiratory: Denies cough, Denies excessive sputum


Gastrointestinal: Denies abdominal pain, Denies diarrhea, Denies nausea, Denies 

vomiting


Genitourinary: Denies mixed incontinence, Denies urge incontinence


Musculoskeletal: Denies low back pain, Denies neck pain


Integumentary: Denies pruritus, Denies rash


Neurological: Reports as per HPI, Reports numbness


Psychiatric: Denies anxiety, Denies depression


Hematologic/Lymphatic: Denies easy bleeding, Denies easy bruising





Past Medical History


Past Medical History: Diabetes Mellitus, Eye Disorder, Hyperlipidemia, 

Hypertension


Additional Past Medical History / Comment(s): 2/25/15  Pt presented to [facility] ER 

with substernal chest pain that started 24 hhours before coming to ER.  She 

noticed the chest pain when she woke up yesterday.  It is a heaviness and is 

intermittent with variable duration.  She also states she had alittle nausea 

with it.   Other HX:  Pt had recent (1/21/15)cataract surgery with lens implants

bilaterally at [facility].  Hospital-post op she had low O2 saturations and was 

told she had a very narrow airway-instead of going home same day, she was in the

hospital for a couple days until her saturations improved.  She was discharged 

with home O2 which she wears at 2L/NC at [facility] and was to follow up today for 

testing for sleep apnea.  Pt states she also has diarrhea fairly frequently. 

Also has hx of 3 ruptured cervical discs with surgery and bilateral retina 

repair.  2018 - intermittently in hospital for 3 months for fluid on lungs, had 

fluid removed


History of Any Multi-Drug Resistant Organisms: MRSA


Date of last positivie culture/infection: 


MDRO Source:: legs and breasts.


Past Surgical History: Bariatric Surgery, Cholecystectomy, Hysterectomy, 

Orthopedic Surgery


Additional Past Surgical History / Comment(s): Bilateral cataract sx with lens 

implants, bilateral retinal repair. Cervical rodding for 3 ruptured discs.  

sleeve gastrectomy         3-1-21


Past Anesthesia/Blood Transfusion Reactions: Postoperative Nausea & Vomiting 

(PONV)


Additional Past Anesthesia/Blood Transfusion Reaction / Comment(s): Pt had 

cataract and lens implant at [facility] on 1/21/15 and afterwards she 

desaturated and was hospitalized.  She was told she has a very narrow airway.  

Pt has never recieved blood.


Past Psychological History: No Psychological Hx Reported


Smoking Status: Former smoker


Past Alcohol Use History: None Reported


Past Drug Use History: None Reported





- Past Family History


  ** Father


Family Medical History: Cancer, Myocardial Infarction (MI)


Additional Family Medical History / Comment(s): Father had 5 MI's and  of 

bone cancer.





  ** Mother


Family Medical History: Cancer


Additional Family Medical History / Comment(s): cervical cancer





Medications and Allergies


                                Home Medications











 Medication  Instructions  Recorded  Confirmed  Type


 


Montelukast [Singulair] 10 mg PO DAILY 18 History


 


Mirabegron [Myrbetriq] 50 mg PO DAILY 20 History


 


Aspirin EC [Ecotrin Low Dose] 81 mg PO HS 21 History


 


Empagliflozin [Jardiance] 10 mg PO DAILY 22 History


 


Ergocalciferol [Vitamin D2 (1250 1,250 mcg PO Q14D 22 History





Mcg = 27452 Iu)]    


 


traMADol HCL 50 mg PO Q6H PRN 22 History


 


Escitalopram [Lexapro] 20 mg PO DAILY 24 History


 


Insuln Asp Prt/Insulin Aspart 20 unit SQ AC-SUPPER 24 History





[NovoLOG MIX 70-30 VIAL]    


 


Insuln Asp Prt/Insulin Aspart 40 unit SQ AC-BRKFST 24 History





[NovoLOG MIX 70-30 VIAL]    


 


Omeprazole 40 mg PO DAILY 24 History


 


Rosuvastatin [Crestor] 20 mg PO HS 24 History


 


Semaglutide [Ozempic] 1 mg SQ TU 24 History


 


Amoxic-Pot Clav 875-125Mg 1 each PO Q12HR #20 tab 24  Rx





[Augmentin 875-125]    


 


Loratadine [Claritin] 10 mg PO DAILY #30 tab 24  Rx


 


Pregabalin [Lyrica] 150 mg PO BID #0 24 Rx


 


lisinopriL [Zestril] 10 mg PO DAILY #0 24 Rx








                                    Allergies











Allergy/AdvReac Type Severity Reaction Status Date / Time


 


Sulfa (Sulfonamide Allergy  Rash, Verified 24 07:10





Antibiotics)   hives,  





   swelling  














Physical Examination





- Vital Signs


Vital Signs: 


                                   Vital Signs











  Temp Pulse Resp BP Pulse Ox


 


 24 17:30  98.1 F  78  16  126/63  98


 


 24 09:00  98.0 F  86  16  131/58  96


 


 24 05:00   70  18  107/56  98


 


 24 03:00   72   


 


 24 01:05   70  18  124/61  97


 


 24 23:00   73  18  128/66  97


 


 24 21:21   74  18  126/70  98














Patient is a middle aged  female, very pleasant, in no acute distress.


Patient is alert awake oriented to time place and person.  Speech and language 

functions are normal.  Patient can name and repeat very well.  No aphasia or 

dysarthria.  Attention, concentration and fund of knowledge is adequate. 





On cranial nerve examination, pupils are equal, round and reacting to light, 

patient states she is blind in the right eye.  Her visual fields are full on 

confrontation, with no neglect on double simultaneous stimulation.  Extraocular 

muscles are intact with no nystagmus.  Face is symmetric, tongue protrudes to 

the midline.  Palatal elevation and sensation normal, hearing and shoulder shrug

 normal, facial sensation normal.  





On muscle strength testing, there is no pronator drift and the strength is 

normal in arms and legs distally and proximally.





Deep tendon reflexes are symmetric very hypoactive in the arms and legs and 

plantars downgoing.





Sensory to touch is equal with no neglect on double simultaneous stimulation.





Cerebellar function showed no ataxia for finger-to-nose testing.  No 

dysdiadochokinesia.  No ataxia for heel-to-shin testing on either side.  Tone 

and bulk of muscles normal.





Gait deferred..





On general examination, there is no carotid bruit or murmur, S1-S2 audible.  

Chest is clear on consultation.  Abdomen is soft nontender.  No organomegaly, 

bowel sounds present.   Peripheral pulses are present.  No peripheral edema.





Results





- Laboratory Findings


CBC and BMP: 


                                 24 17:04





                                 24 17:04


Abnormal Lab Findings: 


                                  Abnormal Labs











  24





  17:04 17:04 17:04


 


WBC  12.8 H  


 


PT   9.6 L 


 


Potassium    3.0 L


 


Carbon Dioxide    31 H


 


BUN    19 H


 


Creatinine    1.30 H


 


Glucose    65 L


 


POC Glucose (mg/dL)   


 


Urine Appearance   


 


Urine Protein   


 


Urine Glucose (UA)   


 


Urine Blood   


 


Ur Leukocyte Esterase   


 


Urine RBC   


 


Urine WBC   


 


Urine WBC Clumps   


 


Urine Mucus   


 


Urine Yeast (Budding)   














  24





  23:21 23:28 07:36


 


WBC   


 


PT   


 


Potassium   


 


Carbon Dioxide   


 


BUN   


 


Creatinine   


 


Glucose   


 


POC Glucose (mg/dL)  275 H   202 H


 


Urine Appearance   Cloudy H 


 


Urine Protein   1+ H 


 


Urine Glucose (UA)   4+ H 


 


Urine Blood   Small H 


 


Ur Leukocyte Esterase   Large H 


 


Urine RBC   10 H 


 


Urine WBC   >182 H 


 


Urine WBC Clumps   Moderate H 


 


Urine Mucus   Rare H 


 


Urine Yeast (Budding)   Many H 














  24





  12:19 16:47


 


WBC  


 


PT  


 


Potassium  


 


Carbon Dioxide  


 


BUN  


 


Creatinine  


 


Glucose  


 


POC Glucose (mg/dL)  425 H  170 H


 


Urine Appearance  


 


Urine Protein  


 


Urine Glucose (UA)  


 


Urine Blood  


 


Ur Leukocyte Esterase  


 


Urine RBC  


 


Urine WBC  


 


Urine WBC Clumps  


 


Urine Mucus  


 


Urine Yeast (Budding)  














Assessment and Plan


Assessment: 





* Recurrent dizziness/lightheadedness on standing up, suggestive of orthostasis.

    Rule out hypovolemia/infection related versus diabetic autonomic neuropathy.


* Right maxillary sinusitis


* Abnormal UA, rule out UTI.


* Acute kidney injury, mild degree.


* Diabetes poorly controlled


* Diabetic peripheral neuropathy


* Ex tobacco use











Plan: 





* Patient states that the dizziness only occurs when she stands up from sitting 

  or laying position.  It last for only couple seconds to a couple minutes.  It 

  does not happen when she rolls over in the bed, bending down, looking up, or 

  in a sitting or laying position.


* Check orthostatics.


* Patient has been started on course of antibiotic (Augmentin), hopefully which 

  will help.


* Hydration





* 2D echo revealed normal left ventricular EF 55 to 60%.  Mildly increased 

  septal wall thickness.  Mildly increased posterior wall thickness.  No obvious

   regional wall motion abnormalities.  Mild right atrial dilation.  Mildly 

  increased left atrial volume.  Aortic valve was poorly visualized but appears 

  to be extremely calcified.  No significant gradient was identified across 

  aortic valve but the gradient could be underestimated.  We will defer to IM to

   address this.


* Carotid Doppler revealed less than 50% stenosis of the carotid bifurcation.  

  Antegrade flow in both vertebral arteries.


* Last hemoglobin A1c 11.7 on 2022.  Recommend optimize control of diabe

  izzy.


* Continue aspirin 81 mg daily and Crestor 20 mg.





* If the symptoms resolve, then clear for discharge.


* If the symptoms recur in the future, then may consider tilt table testing.


* Thank you for the consult.





Addendum:


Orthostatics checked, supine blood pressure 117/71, sitting was 144/82 and 

standing 123/80.


Continue hydration, and recheck orthostatics in the morning.


Continue Augmentin

## 2024-05-01 NOTE — CA
Transthoracic Echo Report 

 Name: Sherron April 

 MRN:    [id number] 

 Age:    59     Gender:     F 

 

 :    1965 

 Exam Date:     2024 09:01 

 Exam Location: [facility] 

 Ht (in):     66     Wt (lb):     220 

 Ordering Physician:        Min Villalobos MD 

 Attending/Referring Phys: 

 Technician         Jessica Hillman RDCS 

 Procedure CPT: 

 Indications:       lvfunction 

 

 Cardiac Hx: 

 Technical Quality:      Fair 

 Contrast 1:                                Total Dose (mL): 

 Contrast 2:                                Total Dose (mL): 

 

 MEASUREMENTS  (Male / Female) Normal Values 

 2D ECHO 

 LV Diastolic Diameter PLAX        5.1 cm                4.2 - 5.9 / 3.9 - 5.3 cm 

 LV Systolic Diameter PLAX         3.5 cm                 

 IVS Diastolic Thickness           1.1 cm                0.6 - 1.0 / 0.6 - 0.9 cm 

 LVPW Diastolic Thickness          1.0 cm                0.6 - 1.0 / 0.6 - 0.9 cm 

 LV Relative Wall Thickness        0.4                    

 RV Internal Dim ED PLAX           2.1 cm                 

 LA Systolic Diameter LX           3.8 cm                3.0 - 4.0 / 2.7 - 3.8 cm 

 LV Diastolic Volume MOD BP        78.6 cm???              67 - 155 / 56 - 104 cm??? 

 LV Systolic Volume MOD BP         44.9 cm???              22 - 58 / 19 - 49 cm??? 

 LV Ejection Fraction MOD BP       42.9 %                >= 55  % 

 LV Cardiac Index MOD BP           1166.1 cm???/min???m???      

 LV Diastolic Volume MOD 4C        84.2 cm???               

 LV Systolic Volume MOD 4C         34.3 cm???               

 LV Ejection Fraction MOD 4C       59.2 %                 

 LV Cardiac Index MOD 4C           1724.0 cm???/min???m???      

 LV Diastolic Length 4C            7.5 cm                 

 LV Systolic Length 4C             5.9 cm                 

 LV Diastolic Volume MOD 2C        73.5 cm???               

 LV Systolic Volume MOD 2C         45.4 cm???               

 LV Ejection Fraction MOD 2C       38.2 %                 

 LV Cardiac Index MOD 2C           972.2 cm???/min???m???       

 LV Diastolic Length 2C            7.5 cm                 

 LV Systolic Length 2C             6.8 cm                 

 LA Volume                         62.5 cm???              18 - 58 / 22 - 52 cm??? 

 LA Volume Index                   28.4 cm???/m???           16 - 28 cm???/m??? 

 

 M-MODE 

 Aortic Root Diameter MM           2.7 cm                 

 LA Systolic Diameter MM           3.1 cm                 

 LA Ao Ratio MM                    1.2                    

 AV Cusp Separation MM             1.6 cm                 

 

 DOPPLER 

 MV Area PHT                       2.8 cm???                

 Mitral E Point Velocity           92.3 cm/s              

 Mitral A Point Velocity           103.6 cm/s             

 Mitral E to A Ratio               0.9                    

 MV Deceleration Time              275.5 ms               

 

 

 FINDINGS 

 Left Ventricle 

 Left ventricular ejection fraction is estimated at 55-60 %.  Mildly increased  

 septal wall thickness. Mildly increased posterior wall thickness. No obvious  

 regional wall motion abnormalities. 

 

 Right Ventricle 

 Normal right ventricular size and function. Unable to estimate the right  

 ventricular systolic pressure. 

 

 Right Atrium 

 Mild right atrial dilatation. 

 

 Left Atrium 

 Mildly increased left atrial volume. 

 

 Mitral Valve 

 Mitral valve not well visualized. Mild mitral regurgitation. No mitral  

 stenosis. 

 

 Aortic Valve 

 The aortic valve was poorly visualized but appears to be extremely calcified. 

 

 Tricuspid Valve 

 Structurally normal tricuspid valve. Trace tricuspid regurgitation. 

 

 Pulmonic Valve 

 Structurally normal pulmonic valve. Trace pulmonic regurgitation. 

 

 Pericardium 

 No pericardial or pleural effusion. 

 

 Aorta 

 Normal size aortic root and proximal ascending aorta. 

 

 CONCLUSIONS 

 Normal LV systolic function.  Mild left ventricular hypertrophy 

 The aortic valve was poorly visualized but appeared to be extremely calcified.   

 No significant gradient was identified across it 

 Previewed by:  

 Dr. Justino Lezama MD 

 (Electronically Signed) 

 Final Date:      01 May 2024 12:06

## 2024-05-01 NOTE — P.HPIM
History of Present Illness


H&P Date: 24





HISTORY OF PRESENT ILLNESS:


59-year-old morbidly obese with active medical history of type 2 diabetes, 

hypertension, hyperlipidemia, chronic neuropathy, chronic GERD, depression, 

incontinence, vitamin D deficiency, chronic pain syndrome.


She presented to the emergency department late last night with complaining of 

headache and dizziness has been symptomatic for 3 months with 3 days seems to be

much worse and has been having weird symptoms of leaning toward the left side an

attempt to walk and ambulate her dizziness has been associated with headache 

denies any other symptoms such as migraine, aura, numbness or weakness on one 

side of her body or the other, no seizure or lack of consciousness no syncope or

presyncope.


Patient has not had any change in medication recently no trauma of any type.


On the presentation to the hospital her workup shows positive UA for UTI, acute 

kidney injury with creatinine 1.23 bun 19 GFR at 46 with significantly low 

potassium at 3.0 and hypo-/hyper glycemia also found to have white blood cell of

12.8 with normal hemoglobin hematocrit and differential.  Chest x-ray showed 

minimal left base plate atelectasis, CAT scan of the brain shows no acute 

intracranial process Anacal consideration for acute right maxillary sinusitis.  

Patient was seen and evaluated by the emergency department and admit patient for

possible need an MRI of the brain along with evaluation and seen by neurology.











REVIEW OF SYSTEMS:


CONSTITUTIONAL: Obese no acute respiratory distress


EYES: No icterus sclerae, no conjunctivitis.


EARS, NOSE, MOUTH, THROAT, and FACE: No sore throat, lymphadenopathy, carotid 

bruits or deformity.  Mild sinus congestion


RESPIRATORY: No shortness of breath cough wheezes.


CARDIOVASCULAR: No CP, Palpitation, PND, Orthopnea, or angina.


GASTROINTESTINAL: No Abd pain, Nausea or vomiting, no Diarrhea or constipation, 

No GI Bleed, no distention or masses.


GENITOURINARY: History of incontinence and positive UA for UTI with mild 

symptoms.


INTEGUMENT/BREAST: Negative for any muscular injury with mild osteoarthritis..


HEMATOLOGIC/LYMPHATIC: Negative for bleed or purpura.  Chronic neuropathy.


MUSCULOSKELTAL: Negative for Myalgia or arthralgia.


NEURLOGICAL: Self headache, dizziness, leaning toward the left side and attempt 

to walk, chronic neuropathy side effect of medication..


BEHAVIORAL/PSYCH: Negative.


ENDOCRINE: Negative.








PHYSICAL EXAMINATION:


General Appearance: Alert, cooperative, no distress, appears stated age.


Neck HEENT: Supple, no lymphadenopathy, no thyroid enlargement, no carotid 

bruits.  Mild pressure on the maxillary sinus.


Lungs: Clear to auscultation without crackles or wheezes no rhonchi, no 

deformity.


Chest Wall: Chest wall normal expansion with deep inspiration no tenderness and 

no deformity was found on exam, no costochondral pain or discomfort.


Heart: Regular rate and rhythm, S1, S2 normal, no murmur, rub or gallop.


Back: Symmetric, no curvature, ROM normal, no CVA tenderness.


Abdomen: Soft, non-tender, bowel sounds active all four quadrants, no masses, no

organomegaly.  Slight discomfort lower abdominal region area.


Extremities: Extremities normal, atraumatic, no cyanosis or edema.


Pulses: 2+ and symmetric.


Skin: Skin color, texture, tugor normal, no rashes or lesions.


Neurologic: Alert oriented x3 cranial nerves II through XII intact, no motor 

deficit, sensation is normal equal bilaterally, she is rested when attempting to

walk there is no leaning toward the left side at this time.





ASSESSMENT AND PLAN:





_Abnormal balance and gait: Can be consistent with TIA versus psychogenic versus

acute infectious process such as sinusitis or UTI could be sign of her 

hypokalemia as well.  Treat her UTI, sinusitis and hypokalemia and reevaluate.  

She still evaluated by neurology and physical therapy.





_?  Of side effect medication patient is on multi medication between Lyrica 150 

mg 3 times a day, tramadol, Myrbetriq, midodrine which defeat the purpose of 

being on lisinopril.  Change medication again watch patient for any further side

effects.





_UTI without sepsis, patient be started on antibiotic, culture will be done to 

try to get the same antibiotic to treat sinusitis as well.





_Sinusitis: Patient can be on Amoxil/clavicle acid orally combined with Zyrtec 

and Flonase no steroid needed at this point.





_Hypokalemia: Will start patient on replacement therapy, repeat potassium 

magnesium again in 12 hours.





_Acute kidney injury: Continue gentle hydration repeat CMP again 24 hours 

continue to watch for any urinary retention our significant decline in kidney 

function.





_Type 2 diabetes: Has been on Ozempic 1 mg weekly, pioglitazone 30 mg a day, 

NovoLog 70/30 15-20 at bedtime and 40 units in the morning should watch for any 

hypoglycemia she is also still on Jardiance 10 mg a day.  Accu-Cheks sliding 

scale coverage will be done.





_Hypo-/hypertension: Despite being on Zestril 20 mg a day she is again on 

midodrine 10 mg 3 times a day with meals.  Watch symptoms carefully maybe we 

should take her off both for the time being if she is on Zestril for renal prot

ection should be able to get replace it to 2.5 mg a day only.





_Hyperlipidemia: Has been on rosuvastatin 20 mg a day resume medication.





_Severe neuropathy: With her Lyrica at 150 mg 3 times a day can cause side 

effect of abnormal balance and gait that can be one of the reason why leaning 

toward the left side in the problem does not understand why the higher dose and 

more frequency dose should be reduced down to 100 mg twice a day possibly and 

watch patient carefully on it.





_Severe GERD: Has been on omeprazole 40 mg a day resume medication.





_Overflow urinary incontinence: Has been on Myrbetriq 50 mg daily will hold 

medication for now they can maybe do does need to be reduced to 25 mg.





_Chronic depression: Has been on Lexapro 20 mg a day.





_GI prophylaxis: Patient is on omeprazole.





_DVT prophylaxis: Knee-high RACHELLE hose and early mobilization.





CODE STATUS: Full code.





Admit patient to the inpatient service for 1-2 night stay.





Past Medical History


Past Medical History: Diabetes Mellitus, Eye Disorder, Hyperlipidemia, 

Hypertension


Additional Past Medical History / Comment(s): 2/25/15  Pt presented to [facility] ER 

with substernal chest pain that started 24 hhours before coming to ER.  She 

noticed the chest pain when she woke up yesterday.  It is a heaviness and is 

intermittent with variable duration.  She also states she had alittle nausea 

with it.   Other HX:  Pt had recent (1/21/15)cataract surgery with lens implants

bilaterally at [place].  Hospital-post op she had low O2 saturations and was 

told she had a very narrow airway-instead of going home same day, she was in the

hospital for a couple days until her saturations improved.  She was discharged 

with home O2 which she wears at 2L/NC at [facility] and was to follow up today for 

testing for sleep apnea.  Pt states she also has diarrhea fairly frequently. 

Also has hx of 3 ruptured cervical discs with surgery and bilateral retina 

repair.  2018 - intermittently in hospital for 3 months for fluid on lungs, had 

fluid removed


History of Any Multi-Drug Resistant Organisms: MRSA


Date of last positivie culture/infection: 


MDRO Source:: legs and breasts.


Past Surgical History: Bariatric Surgery, Cholecystectomy, Hysterectomy, 

Orthopedic Surgery


Additional Past Surgical History / Comment(s): Bilateral cataract sx with lens 

implants, bilateral retinal repair. Cervical rodding for 3 ruptured discs.  

sleeve gastrectomy         3-1-21


Past Anesthesia/Blood Transfusion Reactions: Postoperative Nausea & Vomiting 

(PONV)


Additional Past Anesthesia/Blood Transfusion Reaction / Comment(s): Pt had 

cataract and lens implant at [facility] on 1/21/15 and afterwards she 

desaturated and was hospitalized.  She was told she has a very narrow airway.  

Pt has never recieved blood.


Past Psychological History: No Psychological Hx Reported


Smoking Status: Former smoker


Past Alcohol Use History: None Reported


Past Drug Use History: None Reported





- Past Family History


  ** Father


Family Medical History: Cancer, Myocardial Infarction (MI)


Additional Family Medical History / Comment(s): Father had 5 MI's and  of 

bone cancer.





  ** Mother


Family Medical History: Cancer


Additional Family Medical History / Comment(s): cervical cancer





Medications and Allergies


                                Home Medications











 Medication  Instructions  Recorded  Confirmed  Type


 


Montelukast [Singulair] 10 mg PO DAILY 18 History


 


Mirabegron [Myrbetriq] 50 mg PO DAILY 20 History


 


Aspirin EC [Ecotrin Low Dose] 81 mg PO HS 21 History


 


Empagliflozin [Jardiance] 10 mg PO DAILY 22 History


 


Ergocalciferol [Vitamin D2 (1250 1,250 mcg PO SA 22 History





Mcg = 39708 Iu)]    


 


Midodrine HCl [ProAmatine] 10 mg PO TID-W/MEALS 22 History


 


Pioglitazone HCl 30 mg PO DAILY 22 History


 


traMADol HCL 50 mg PO Q6H PRN 22 History


 


Escitalopram [Lexapro] 20 mg PO DAILY 24 History


 


Insuln Asp Prt/Insulin Aspart 15 - 20 unit SQ HS 24 History





[NovoLOG MIX 70-30 VIAL]    


 


Insuln Asp Prt/Insulin Aspart 40 unit SQ DAILY 24 History





[NovoLOG MIX 70-30 VIAL]    


 


Omeprazole 40 mg PO DAILY 24 History


 


Potassium Chloride ER [K-Dur 20] 20 meq PO DAILY #3 tab 24  Rx


 


Pregabalin [Lyrica] 150 mg PO TID 24 History


 


Rosuvastatin [Crestor] 20 mg PO HS 24 History


 


Semaglutide [Ozempic] 1 mg SQ TU 24 History


 


lisinopriL [Zestril] 20 mg PO DAILY 24 History








                                    Allergies











Allergy/AdvReac Type Severity Reaction Status Date / Time


 


Sulfa (Sulfonamide Allergy  Rash, Verified 24 16:56





Antibiotics)   hives,  





   swelling  














Physical Exam


Vitals: 


                                   Vital Signs











  Temp Pulse Resp BP Pulse Ox


 


 24 05:00   70  18  107/56  98


 


 24 03:00   72   


 


 24 01:05   70  18  124/61  97


 


 24 23:00   73  18  128/66  97


 


 24 21:21   74  18  126/70  98


 


 24 16:45  97.4 F L  74  18  87/56  99








                                Intake and Output











 24





 14:59 22:59 06:59


 


Other:   


 


  Weight  99.79 kg 














Results


CBC & Chem 7: 


                                 24 17:04





                                 24 17:04


Labs: 


                  Abnormal Lab Results - Last 24 Hours (Table)











  24 Range/Units





  17:04 17:04 17:04 


 


WBC  12.8 H    (3.8-10.6)  k/uL


 


PT   9.6 L   (10.0-12.5)  sec


 


Potassium    3.0 L  (3.5-5.1)  mmol/L


 


Carbon Dioxide    31 H  (22-30)  mmol/L


 


BUN    19 H  (7-17)  mg/dL


 


Creatinine    1.30 H  (0.52-1.04)  mg/dL


 


Glucose    65 L  (74-99)  mg/dL


 


POC Glucose (mg/dL)     ()  mg/dL


 


Urine Appearance     (Clear)  


 


Urine Protein     (Negative)  


 


Urine Glucose (UA)     (Negative)  


 


Urine Blood     (Negative)  


 


Ur Leukocyte Esterase     (Negative)  


 


Urine RBC     (0-5)  /hpf


 


Urine WBC     (0-5)  /hpf


 


Urine WBC Clumps     (None)  /hpf


 


Urine Mucus     (None)  /hpf


 


Urine Yeast (Budding)     (None)  /hpf














  24 Range/Units





  23:21 23:28 


 


WBC    (3.8-10.6)  k/uL


 


PT    (10.0-12.5)  sec


 


Potassium    (3.5-5.1)  mmol/L


 


Carbon Dioxide    (22-30)  mmol/L


 


BUN    (7-17)  mg/dL


 


Creatinine    (0.52-1.04)  mg/dL


 


Glucose    (74-99)  mg/dL


 


POC Glucose (mg/dL)  275 H   ()  mg/dL


 


Urine Appearance   Cloudy H  (Clear)  


 


Urine Protein   1+ H  (Negative)  


 


Urine Glucose (UA)   4+ H  (Negative)  


 


Urine Blood   Small H  (Negative)  


 


Ur Leukocyte Esterase   Large H  (Negative)  


 


Urine RBC   10 H  (0-5)  /hpf


 


Urine WBC   >182 H  (0-5)  /hpf


 


Urine WBC Clumps   Moderate H  (None)  /hpf


 


Urine Mucus   Rare H  (None)  /hpf


 


Urine Yeast (Budding)   Many H  (None)  /hpf

## 2024-05-01 NOTE — US
EXAMINATION TYPE: US carotid duplex BILAT

 

DATE OF EXAM: 4/30/2024

 

COMPARISON: NONE

 

CLINICAL INDICATION: Female, 59 years old with history of cva; dizziness

 

TECHNIQUE: Carotid duplex ultrasound examination. Indirect Doppler criteria was utilized.

 

FINDINGS:

 

EXAM MEASUREMENTS: 

 

RIGHT:  Peak Systolic Velocity (PSV) cm/sec

----- Right CCA:  89.1  

----- Right ICA:  118     

----- Right ECA:  119   

ICA/CCA ratio:  1.3    

 

RIGHT:  End Diastole cm/sec

----- Right CCA:  25.5   

----- Right ICA:  27.0      

----- Right ECA:  10.2     

 

LEFT:  Peak Systolic Velocity (PSV) cm/sec

----- Left CCA:  91.1  

----- Left ICA:  92.7   

----- Left ECA:  133  

ICA/CCA ratio:  1.0  

 

LEFT:  End Diastole cm/sec

----- Left CCA:  17.4  

----- Left ICA:  23.2   

----- Left ECA:  15.7 

 

VERTEBRALS (direction of flow):

Right Vertebral: Antegrade

Left Vertebral: Antegrade

 

Rhythm:  Normal

 

SONOGRAPHER NOTES: Plaque seen in bilateral bulbs and prox ICA's

 

 

 

IMPRESSION:  

Less than 50% stenosis of the carotid bifurcations.

 

 

 

 

 

 

Criteria for Assigning % of Stenosis / Diameter reduction

(Estimation based on the indirect measurements of the internal carotid artery velocities (ICA PSV).

1.  Normal (no stenosis)=ICA PSV < 125 cm/s: ratio < 2.0: ICA EDV<40 cm/s.

2. Less than 50% stenosis=ICA PSV < 125 cm/s: ratio < 2.0: ICA EDV<40 cm/s.

3.  50 to 69% stenosis=ICA PSV of 125 to 230 cm/s: ration 2.0 ? 4.0: ICA EDV  cm/s.

4.  Greater than 70% stenosis to near occlusion= ICA PSV > 230 cm/s: ratio > 4.0: ICA EDV > 100 cm/s.
 

5.  Near occlusion= ICA PSV velocities may be low or undetectable: variable ratio and ICA EDV.

6.  Total occlusion=unable to detect flow.

## 2024-05-02 VITALS — TEMPERATURE: 98.1 F | RESPIRATION RATE: 18 BRPM | DIASTOLIC BLOOD PRESSURE: 74 MMHG | SYSTOLIC BLOOD PRESSURE: 125 MMHG

## 2024-05-02 VITALS — HEART RATE: 79 BPM

## 2024-05-02 LAB — GLUCOSE BLD-MCNC: 119 MG/DL (ref 70–110)

## 2024-05-02 RX ADMIN — ACETAMINOPHEN PRN MG: 325 TABLET, FILM COATED ORAL at 06:47

## 2024-05-03 NOTE — P.PN
Subjective


Progress Note Date: 05/02/24





HISTORY OF PRESENT ILLNESS:


59-year-old morbidly obese with active medical history of type 2 diabetes, 

hypertension, hyperlipidemia, chronic neuropathy, chronic GERD, depression, inc

ontinence, vitamin D deficiency, chronic pain syndrome.


She presented to the emergency department late last night with complaining of 

headache and dizziness has been symptomatic for 3 months with 3 days seems to be

much worse and has been having weird symptoms of leaning toward the left side an

attempt to walk and ambulate her dizziness has been associated with headache 

denies any other symptoms such as migraine, aura, numbness or weakness on one 

side of her body or the other, no seizure or lack of consciousness no syncope or

presyncope.


Patient has not had any change in medication recently no trauma of any type.


On the presentation to the hospital her workup shows positive UA for UTI, acute 

kidney injury with creatinine 1.23 bun 19 GFR at 46 with significantly low 

potassium at 3.0 and hypo-/hyper glycemia also found to have white blood cell of

12.8 with normal hemoglobin hematocrit and differential.  Chest x-ray showed 

minimal left base plate atelectasis, CAT scan of the brain shows no acute 

intracranial process Anacal consideration for acute right maxillary sinusitis.  

Patient was seen and evaluated by the emergency department and admit patient for

possible need an MRI of the brain along with evaluation and seen by neurology.





May 2, 2024: Patient is feeling much better she was seen neurology who agree 

with the current plan and believe combination of her recurrent dizziness 

lightheadedness suggestive of orthostatic along with hypovolemia with diabetic 

autonomic neuropathy also treating her maxillary sinus and UTI might help 

significantly and with better control of her diabetes with neuropathy along with

adjusting her medication might make a huge difference on her outcome.


Review her echocardiogram showed ejection fraction of 60 percentile with no 

major valvular abnormality no sign of patent blevins ovale.  Diabetic management

wise with her A1c being 11.7 despite seeing endocrinology this is poorly 

controlled diabetes.


Carotid ultrasound showed less than 50 percentile blockage of the carotid 

artery.


Patient's symptoms are much better and patient was very clear for discharge on 

5/2/2024.





REVIEW OF SYSTEMS:


CONSTITUTIONAL: Obese no acute respiratory distress


EYES: No icterus sclerae, no conjunctivitis.


EARS, NOSE, MOUTH, THROAT, and FACE: No sore throat, lymphadenopathy, carotid 

bruits or deformity.  Mild sinus congestion


RESPIRATORY: No shortness of breath cough wheezes.


CARDIOVASCULAR: No CP, Palpitation, PND, Orthopnea, or angina.


GASTROINTESTINAL: No Abd pain, Nausea or vomiting, no Diarrhea or constipation, 

No GI Bleed, no distention or masses.


GENITOURINARY: History of incontinence and positive UA for UTI with mild 

symptoms.


INTEGUMENT/BREAST: Negative for any muscular injury with mild osteoarthritis..


HEMATOLOGIC/LYMPHATIC: Negative for bleed or purpura.  Chronic neuropathy.


MUSCULOSKELTAL: Negative for Myalgia or arthralgia.


NEURLOGICAL: Self headache, dizziness, leaning toward the left side and attempt 

to walk, chronic neuropathy side effect of medication..


BEHAVIORAL/PSYCH: Negative.


ENDOCRINE: Negative.








PHYSICAL EXAMINATION:


General Appearance: Alert, cooperative, no distress, appears stated age.


Neck HEENT: Supple, no lymphadenopathy, no thyroid enlargement, no carotid 

bruits.  Mild pressure on the maxillary sinus.


Lungs: Clear to auscultation without crackles or wheezes no rhonchi, no 

deformity.


Chest Wall: Chest wall normal expansion with deep inspiration no tenderness and 

no deformity was found on exam, no costochondral pain or discomfort.


Heart: Regular rate and rhythm, S1, S2 normal, no murmur, rub or gallop.


Back: Symmetric, no curvature, ROM normal, no CVA tenderness.


Abdomen: Soft, non-tender, bowel sounds active all four quadrants, no masses, no

organomegaly.  Slight discomfort lower abdominal region area.


Extremities: Extremities normal, atraumatic, no cyanosis or edema.


Pulses: 2+ and symmetric.


Skin: Skin color, texture, tugor normal, no rashes or lesions.


Neurologic: Alert oriented x3 cranial nerves II through XII intact, no motor 

deficit, sensation is normal equal bilaterally, she is rested when attempting to

walk there is no leaning toward the left side at this time.





ASSESSMENT AND PLAN:





_Abnormal balance and gait: Can be consistent with TIA versus psychogenic versus

acute infectious process such as sinusitis or UTI could be sign of her 

hypokalemia as well.  Treat her UTI, sinusitis and hypokalemia and reevaluate.  

She still evaluated by neurology and physical therapy.





_?  Of side effect medication patient is on multi medication between Lyrica 150 

mg 3 times a day, tramadol, Myrbetriq, midodrine which defeat the purpose of 

being on lisinopril.  Change medication again watch patient for any further side

effects.





_UTI without sepsis, patient be started on antibiotic, culture will be done to 

try to get the same antibiotic to treat sinusitis as well.





_Sinusitis: Patient can be on Amoxil/clavicle acid orally combined with Zyrtec 

and Flonase no steroid needed at this point.





_Hypokalemia: Will start patient on replacement therapy, repeat potassium 

magnesium again in 12 hours.





_Acute kidney injury: Continue gentle hydration repeat CMP again 24 hours 

continue to watch for any urinary retention our significant decline in kidney 

function.





_Type 2 diabetes: Has been on Ozempic 1 mg weekly, pioglitazone 30 mg a day, 

NovoLog 70/30 15-20 at bedtime and 40 units in the morning should watch for any 

hypoglycemia she is also still on Jardiance 10 mg a day.  Accu-Cheks sliding 

scale coverage will be done.





_Hypo-/hypertension: Despite being on Zestril 20 mg a day she is again on 

midodrine 10 mg 3 times a day with meals.  Watch symptoms carefully maybe we 

should take her off both for the time being if she is on Zestril for renal 

protection should be able to get replace it to 2.5 mg a day only.





_Hyperlipidemia: Has been on rosuvastatin 20 mg a day resume medication.





_Severe neuropathy: With her Lyrica at 150 mg 3 times a day can cause side 

effect of abnormal balance and gait that can be one of the reason why leaning 

toward the left side in the problem does not understand why the higher dose and 

more frequency dose should be reduced down to 100 mg twice a day possibly and 

watch patient carefully on it.





_Severe GERD: Has been on omeprazole 40 mg a day resume medication.





_Overflow urinary incontinence: Has been on Myrbetriq 50 mg daily will hold 

medication for now they can maybe do does need to be reduced to 25 mg.





_Chronic depression: Has been on Lexapro 20 mg a day.





_GI prophylaxis: Patient is on omeprazole.





_DVT prophylaxis: Knee-high RACHELLE hose and early mobilization.














Objective





- Vital Signs


Vital signs: 


                                   Vital Signs











Temp  98.2 F   05/02/24 02:21


 


Pulse  79   05/02/24 02:21


 


Resp  15   05/02/24 02:21


 


BP  132/71   05/02/24 02:21


 


Pulse Ox  95   05/02/24 02:21


 


FiO2      








                                 Intake & Output











 05/01/24 05/01/24 05/02/24





 06:59 18:59 06:59


 


Weight   99.79 kg


 


Other:   


 


  Voiding Method   Toilet


 


  # Voids   1














- Labs


CBC & Chem 7: 


                                 04/30/24 17:04





                                 04/30/24 17:04


Labs: 


                  Abnormal Lab Results - Last 24 Hours (Table)











  05/01/24 05/01/24 05/01/24 Range/Units





  07:36 12:19 16:47 


 


POC Glucose (mg/dL)  202 H  425 H  170 H  ()  mg/dL














  05/01/24 Range/Units





  20:49 


 


POC Glucose (mg/dL)  230 H  ()  mg/dL

## 2024-05-03 NOTE — P.DS
Providers


Date of admission: 


04/30/24 21:33





Attending physician: 


Min Villalobos





Consults: 





                                        





04/30/24 21:32


Consult Physician Urgent 


   Consulting Provider: Chuck Obrien


   Consult Reason/Comments: Dizziness, difficulty ambulating


   Do you want consulting provider notified?: Yes











Primary care physician: 


Emily Génesis





[facility] Course: 





HISTORY OF PRESENT ILLNESS:


59-year-old morbidly obese with active medical history of type 2 diabetes, 

hypertension, hyperlipidemia, chronic neuropathy, chronic GERD, depression, 

incontinence, vitamin D deficiency, chronic pain syndrome.


She presented to the emergency department late last night with complaining of 

headache and dizziness has been symptomatic for 3 months with 3 days seems to be

much worse and has been having weird symptoms of leaning toward the left side an

attempt to walk and ambulate her dizziness has been associated with headache 

denies any other symptoms such as migraine, aura, numbness or weakness on one 

side of her body or the other, no seizure or lack of consciousness no syncope or

presyncope.


Patient has not had any change in medication recently no trauma of any type.


On the presentation to the hospital her workup shows positive UA for UTI, acute 

kidney injury with creatinine 1.23 bun 19 GFR at 46 with significantly low 

potassium at 3.0 and hypo-/hyper glycemia also found to have white blood cell of

12.8 with normal hemoglobin hematocrit and differential.  Chest x-ray showed 

minimal left base plate atelectasis, CAT scan of the brain shows no acute 

intracranial process Anacal consideration for acute right maxillary sinusitis.  

Patient was seen and evaluated by the emergency department and admit patient for

possible need an MRI of the brain along with evaluation and seen by neurology.





May 2, 2024: Patient is feeling much better she was seen neurology who agree 

with the current plan and believe combination of her recurrent dizziness 

lightheadedness suggestive of orthostatic along with hypovolemia with diabetic 

autonomic neuropathy also treating her maxillary sinus and UTI might help 

significantly and with better control of her diabetes with neuropathy along with

adjusting her medication might make a huge difference on her outcome.


Review her echocardiogram showed ejection fraction of 60 percentile with no 

major valvular abnormality no sign of patent blevins ovale.  Diabetic management

wise with her A1c being 11.7 despite seeing endocrinology this is poorly 

controlled diabetes.


Carotid ultrasound showed less than 50 percentile blockage of the carotid 

artery.


Patient's symptoms are much better and patient was very clear for discharge on 

5/2/2024.





REVIEW OF SYSTEMS:


CONSTITUTIONAL: Obese no acute respiratory distress


EYES: No icterus sclerae, no conjunctivitis.


EARS, NOSE, MOUTH, THROAT, and FACE: No sore throat, lymphadenopathy, carotid 

bruits or deformity.  Mild sinus congestion


RESPIRATORY: No shortness of breath cough wheezes.


CARDIOVASCULAR: No CP, Palpitation, PND, Orthopnea, or angina.


GASTROINTESTINAL: No Abd pain, Nausea or vomiting, no Diarrhea or constipation, 

No GI Bleed, no distention or masses.


GENITOURINARY: History of incontinence and positive UA for UTI with mild 

symptoms.


INTEGUMENT/BREAST: Negative for any muscular injury with mild osteoarthritis..


HEMATOLOGIC/LYMPHATIC: Negative for bleed or purpura.  Chronic neuropathy.


MUSCULOSKELTAL: Negative for Myalgia or arthralgia.


NEURLOGICAL: Self headache, dizziness, leaning toward the left side and attempt 

to walk, chronic neuropathy side effect of medication..


BEHAVIORAL/PSYCH: Negative.


ENDOCRINE: Negative.








PHYSICAL EXAMINATION:


General Appearance: Alert, cooperative, no distress, appears stated age.


Neck HEENT: Supple, no lymphadenopathy, no thyroid enlargement, no carotid 

bruits.  Mild pressure on the maxillary sinus.


Lungs: Clear to auscultation without crackles or wheezes no rhonchi, no 

deformity.


Chest Wall: Chest wall normal expansion with deep inspiration no tenderness and 

no deformity was found on exam, no costochondral pain or discomfort.


Heart: Regular rate and rhythm, S1, S2 normal, no murmur, rub or gallop.


Back: Symmetric, no curvature, ROM normal, no CVA tenderness.


Abdomen: Soft, non-tender, bowel sounds active all four quadrants, no masses, no

organomegaly.  Slight discomfort lower abdominal region area.


Extremities: Extremities normal, atraumatic, no cyanosis or edema.


Pulses: 2+ and symmetric.


Skin: Skin color, texture, tugor normal, no rashes or lesions.


Neurologic: Alert oriented x3 cranial nerves II through XII intact, no motor 

deficit, sensation is normal equal bilaterally, she is rested when attempting to

walk there is no leaning toward the left side at this time.





ASSESSMENT AND PLAN:





_Abnormal balance and gait: Can be consistent with TIA versus psychogenic versus

acute infectious process such as sinusitis or UTI could be sign of her 

hypokalemia as well.  Treat her UTI, sinusitis and hypokalemia and reevaluate.  

She still evaluated by neurology and physical therapy.





_?  Of side effect medication patient is on multi medication between Lyrica 150 

mg 3 times a day, tramadol, Myrbetriq, midodrine which defeat the purpose of 

being on lisinopril.  Change medication again watch patient for any further side

effects.





_UTI without sepsis, patient be started on antibiotic, culture will be done to 

try to get the same antibiotic to treat sinusitis as well.





_Sinusitis: Patient can be on Amoxil/clavicle acid orally combined with Zyrtec 

and Flonase no steroid needed at this point.





_Hypokalemia: Will start patient on replacement therapy, repeat potassium 

magnesium again in 12 hours.





_Acute kidney injury: Continue gentle hydration repeat CMP again 24 hours 

continue to watch for any urinary retention our significant decline in kidney 

function.





_Type 2 diabetes: Has been on Ozempic 1 mg weekly, pioglitazone 30 mg a day, 

NovoLog 70/30 15-20 at bedtime and 40 units in the morning should watch for any 

hypoglycemia she is also still on Jardiance 10 mg a day.  Accu-Cheks sliding 

scale coverage will be done.





_Hypo-/hypertension: Despite being on Zestril 20 mg a day she is again on 

midodrine 10 mg 3 times a day with meals.  Watch symptoms carefully maybe we 

should take her off both for the time being if she is on Zestril for renal 

protection should be able to get replace it to 2.5 mg a day only.





_Hyperlipidemia: Has been on rosuvastatin 20 mg a day resume medication.





_Severe neuropathy: With her Lyrica at 150 mg 3 times a day can cause side 

effect of abnormal balance and gait that can be one of the reason why leaning 

toward the left side in the problem does not understand why the higher dose and 

more frequency dose should be reduced down to 100 mg twice a day possibly and 

watch patient carefully on it.





_Severe GERD: Has been on omeprazole 40 mg a day resume medication.





_Overflow urinary incontinence: Has been on Myrbetriq 50 mg daily will hold 

medication for now they can maybe do does need to be reduced to 25 mg.





_Chronic depression: Has been on Lexapro 20 mg a day.





_GI prophylaxis: Patient is on omeprazole.





_DVT prophylaxis: Knee-high RACHELLE hose and early mobilization.








Hospital course:


Patient was admitted to the hospital on May 1 with severe abnormal balance and 

gait leaning toward the left side with possible TIA versus encephalopathy 

finding consistent with acute sinusitis, UTI, severe hypokalemia and severe side

effect of multi medication with complication.


Also patient has left type of diabetic autonomic neuropathy especially with her 

blood sugar not in control to go back to endocrinology and primary care for 

better management.


Blood pressure was better controlled.


Testing including echocardiogram showed good ejection fraction with no major 

abnormality.


Carotid ultrasound revealed less than 50% blockage in the carotid.


Ambulate and being active her symptoms have resolved the patient was very clear 

for discharge on May 2, 2024.


Patient was seen and evaluated by neurology who agree with the current 

management at this point.





Time spent on patient discharge was over 32 minutes.


Patient Condition at Discharge: Good





Plan - Discharge Summary


Discharge Rx Participant: No


New Discharge Prescriptions: 


New


   Amoxic-Pot Clav 875-125Mg [Augmentin 875-125] 1 each PO Q12HR #20 tab


   Loratadine [Claritin] 10 mg PO DAILY #30 tab





Continue


   Montelukast [Singulair] 10 mg PO DAILY


   Mirabegron [Myrbetriq] 50 mg PO DAILY


   Aspirin EC [Ecotrin Low Dose] 81 mg PO HS


   traMADol HCL 50 mg PO Q6H PRN


     PRN Reason: Pain


   Empagliflozin [Jardiance] 10 mg PO DAILY


   Semaglutide [Ozempic] 1 mg SQ TU


   Omeprazole 40 mg PO DAILY


   Escitalopram [Lexapro] 20 mg PO DAILY


   Ergocalciferol [Vitamin D2 (1250 Mcg = 13220 Iu)] 1,250 mcg PO Q14D


   Rosuvastatin [Crestor] 20 mg PO HS


   Insuln Asp Prt/Insulin Aspart [NovoLOG MIX 70-30 VIAL] 40 unit SQ AC-BRKFST


   Insuln Asp Prt/Insulin Aspart [NovoLOG MIX 70-30 VIAL] 20 unit SQ AC-SUPPER





Changed


   Pregabalin [Lyrica] 150 mg PO BID #0


   lisinopriL [Zestril] 10 mg PO DAILY #0





Discontinued


   Midodrine HCl [ProAmatine] 10 mg PO AC-TID


   Pioglitazone HCl 30 mg PO DAILY


Discharge Medication List





Montelukast [Singulair] 10 mg PO DAILY 02/13/18 [History]


Mirabegron [Myrbetriq] 50 mg PO DAILY 09/02/20 [History]


Aspirin EC [Ecotrin Low Dose] 81 mg PO HS 03/08/21 [History]


Empagliflozin [Jardiance] 10 mg PO DAILY 09/28/22 [History]


Ergocalciferol [Vitamin D2 (1250 Mcg = 17121 Iu)] 1,250 mcg PO Q14D 09/28/22 

[History]


traMADol HCL 50 mg PO Q6H PRN 09/28/22 [History]


Escitalopram [Lexapro] 20 mg PO DAILY 02/08/24 [History]


Insuln Asp Prt/Insulin Aspart [NovoLOG MIX 70-30 VIAL] 20 unit SQ AC-SUPPER 

02/08/24 [History]


Insuln Asp Prt/Insulin Aspart [NovoLOG MIX 70-30 VIAL] 40 unit SQ AC-BRKFST 

02/08/24 [History]


Omeprazole 40 mg PO DAILY 02/08/24 [History]


Rosuvastatin [Crestor] 20 mg PO HS 02/08/24 [History]


Semaglutide [Ozempic] 1 mg SQ TU 02/08/24 [History]


Amoxic-Pot Clav 875-125Mg [Augmentin 875-125] 1 each PO Q12HR #20 tab 05/02/24 

[Rx]


Loratadine [Claritin] 10 mg PO DAILY #30 tab 05/02/24 [Rx]


Pregabalin [Lyrica] 150 mg PO BID #0 05/02/24 [Rx]


lisinopriL [Zestril] 10 mg PO DAILY #0 05/02/24 [Rx]








Follow up Appointment(s)/Referral(s): 


Emily Capps MD [Primary Care Provider] - 1-2 days


Jerman Rodgers MD [REFERRING] - 1 Week


Discharge Disposition: HOME SELF-CARE

## 2024-07-03 ENCOUNTER — HOSPITAL ENCOUNTER (EMERGENCY)
Dept: HOSPITAL 47 - EC | Age: 59
LOS: 1 days | Discharge: HOME | End: 2024-07-04
Payer: MEDICARE

## 2024-07-03 DIAGNOSIS — R73.9: ICD-10-CM

## 2024-07-03 DIAGNOSIS — Z88.8: ICD-10-CM

## 2024-07-03 DIAGNOSIS — Z88.1: ICD-10-CM

## 2024-07-03 DIAGNOSIS — M79.661: ICD-10-CM

## 2024-07-03 DIAGNOSIS — Z87.891: ICD-10-CM

## 2024-07-03 DIAGNOSIS — R82.71: ICD-10-CM

## 2024-07-03 DIAGNOSIS — M79.662: Primary | ICD-10-CM

## 2024-07-03 DIAGNOSIS — Z88.2: ICD-10-CM

## 2024-07-03 DIAGNOSIS — Z90.49: ICD-10-CM

## 2024-07-03 LAB
ALBUMIN SERPL-MCNC: 3.7 G/DL (ref 3.5–5)
ALP SERPL-CCNC: 118 U/L (ref 38–126)
ALT SERPL-CCNC: 15 U/L (ref 4–34)
ANION GAP SERPL CALC-SCNC: 8 MMOL/L
AST SERPL-CCNC: 20 U/L (ref 14–36)
BASOPHILS # BLD AUTO: 0 K/UL (ref 0–0.2)
BASOPHILS NFR BLD AUTO: 0 %
BUN SERPL-SCNC: 14 MG/DL (ref 7–17)
CALCIUM SPEC-MCNC: 8.6 MG/DL (ref 8.4–10.2)
CHLORIDE SERPL-SCNC: 94 MMOL/L (ref 98–107)
CO2 SERPL-SCNC: 23 MMOL/L (ref 22–30)
EOSINOPHIL # BLD AUTO: 0.3 K/UL (ref 0–0.7)
EOSINOPHIL NFR BLD AUTO: 3 %
ERYTHROCYTE [DISTWIDTH] IN BLOOD BY AUTOMATED COUNT: 3.97 M/UL (ref 3.8–5.4)
ERYTHROCYTE [DISTWIDTH] IN BLOOD: 14.4 % (ref 11.5–15.5)
GLUCOSE BLD-MCNC: 595 MG/DL (ref 70–110)
GLUCOSE SERPL-MCNC: 580 MG/DL (ref 74–99)
GLUCOSE UR QL: (no result)
HCO3 BLDV-SCNC: 28 MMOL/L (ref 24–28)
HCT VFR BLD AUTO: 34.1 % (ref 34–46)
HGB BLD-MCNC: 11 GM/DL (ref 11.4–16)
LYMPHOCYTES # SPEC AUTO: 3.2 K/UL (ref 1–4.8)
LYMPHOCYTES NFR SPEC AUTO: 36 %
MCH RBC QN AUTO: 27.7 PG (ref 25–35)
MCHC RBC AUTO-ENTMCNC: 32.2 G/DL (ref 31–37)
MCV RBC AUTO: 86 FL (ref 80–100)
MONOCYTES # BLD AUTO: 0.5 K/UL (ref 0–1)
MONOCYTES NFR BLD AUTO: 5 %
NEUTROPHILS # BLD AUTO: 4.7 K/UL (ref 1.3–7.7)
NEUTROPHILS NFR BLD AUTO: 53 %
PCO2 BLDV: 55 MMHG (ref 37–51)
PH BLDV: 7.31 [PH] (ref 7.31–7.41)
PH UR: 5 [PH] (ref 5–8)
PLATELET # BLD AUTO: 284 K/UL (ref 150–450)
POTASSIUM SERPL-SCNC: 4 MMOL/L (ref 3.5–5.1)
PROT SERPL-MCNC: 6.1 G/DL (ref 6.3–8.2)
RBC UR QL: 8 /HPF (ref 0–5)
SODIUM SERPL-SCNC: 125 MMOL/L (ref 137–145)
SP GR UR: 1.03 (ref 1–1.03)
SQUAMOUS UR QL AUTO: 1 /HPF (ref 0–4)
UROBILINOGEN UR QL STRIP: <2 MG/DL (ref ?–2)
WBC # BLD AUTO: 8.9 K/UL (ref 3.8–10.6)
WBC #/AREA URNS HPF: 40 /HPF (ref 0–5)

## 2024-07-03 PROCEDURE — 83930 ASSAY OF BLOOD OSMOLALITY: CPT

## 2024-07-03 PROCEDURE — 96360 HYDRATION IV INFUSION INIT: CPT

## 2024-07-03 PROCEDURE — 83605 ASSAY OF LACTIC ACID: CPT

## 2024-07-03 PROCEDURE — 96361 HYDRATE IV INFUSION ADD-ON: CPT

## 2024-07-03 PROCEDURE — 80053 COMPREHEN METABOLIC PANEL: CPT

## 2024-07-03 PROCEDURE — 87086 URINE CULTURE/COLONY COUNT: CPT

## 2024-07-03 PROCEDURE — 99283 EMERGENCY DEPT VISIT LOW MDM: CPT

## 2024-07-03 PROCEDURE — 82803 BLOOD GASES ANY COMBINATION: CPT

## 2024-07-03 PROCEDURE — 93970 EXTREMITY STUDY: CPT

## 2024-07-03 PROCEDURE — 85025 COMPLETE CBC W/AUTO DIFF WBC: CPT

## 2024-07-03 PROCEDURE — 36415 COLL VENOUS BLD VENIPUNCTURE: CPT

## 2024-07-03 PROCEDURE — 81001 URINALYSIS AUTO W/SCOPE: CPT

## 2024-07-03 PROCEDURE — 82009 KETONE BODYS QUAL: CPT

## 2024-07-03 RX ADMIN — CEFAZOLIN STA MLS/HR: 330 INJECTION, POWDER, FOR SOLUTION INTRAMUSCULAR; INTRAVENOUS at 19:04

## 2024-07-03 RX ADMIN — INSULIN HUMAN ONE UNIT: 100 INJECTION, SOLUTION PARENTERAL at 22:49

## 2024-07-03 RX ADMIN — CEFAZOLIN STA MLS/HR: 330 INJECTION, POWDER, FOR SOLUTION INTRAMUSCULAR; INTRAVENOUS at 21:46

## 2024-07-03 NOTE — ED
General Adult HPI





- General


Source: patient, RN notes reviewed


Mode of arrival: ambulatory


Limitations: no limitations





<Radha Rod - Last Filed: 24 18:18>





<Leigh Duran - Last Filed: 24 01:36>





- General


Chief complaint: Fall


Stated complaint: fall-leg pain


Time Seen by Provider: 24 17:52





- History of Present Illness


Initial comments: 


This is a 59-year-old female presents the emergency department chief complaint 

of bilateral lower extremity pain.  Patient states that she was at home on 

ay she had a sensation that her knees give out which has happened many times in 

the past resulting in her falling backwards onto her buttocks and back.  Patient

denies hitting her head or loss of consciousness at the time of this fall.  

Patient states that she went to her primary care provider today where they 

recommended that she receive an ultrasound of the lower extremities due to 

concern for erythema and edema.  Patient denies history of DVT or PE.  She 

denies recent prolonged travel or surgery.  Denies blood thinner use.


 (Radha Rod)





- Related Data


                                Home Medications











 Medication  Instructions  Recorded  Confirmed


 


Montelukast [Singulair] 10 mg PO DAILY 18


 


Mirabegron [Myrbetriq] 50 mg PO DAILY 20


 


Aspirin EC [Ecotrin Low Dose] 81 mg PO HS 21


 


Empagliflozin [Jardiance] 10 mg PO DAILY 22


 


Ergocalciferol [Vitamin D2 (1250 1,250 mcg PO Q14D 22





Mcg = 43685 Iu)]   


 


traMADol HCL 50 mg PO Q6H PRN 22


 


Escitalopram [Lexapro] 20 mg PO DAILY 24


 


Insuln Asp Prt/Insulin Aspart 20 unit SQ AC-SUPPER 24





[NovoLOG MIX 70-30 VIAL]   


 


Insuln Asp Prt/Insulin Aspart 40 unit SQ AC-BRKFST 24





[NovoLOG MIX 70-30 VIAL]   


 


Omeprazole 40 mg PO DAILY 24


 


Rosuvastatin [Crestor] 20 mg PO HS 24


 


Semaglutide [Ozempic] 1 mg SQ TU 24








                                  Previous Rx's











 Medication  Instructions  Recorded


 


Amoxic-Pot Clav 875-125Mg 1 each PO Q12HR #20 tab 24





[Augmentin 875-125]  


 


Loratadine [Claritin] 10 mg PO DAILY #30 tab 24


 


Pregabalin [Lyrica] 150 mg PO BID #0 24


 


lisinopriL [Zestril] 10 mg PO DAILY #0 24











                                    Allergies











Allergy/AdvReac Type Severity Reaction Status Date / Time


 


Sulfa (Sulfonamide Allergy  Rash, Verified 24 17:32





Antibiotics)   hives,  





   swelling  














Review of Systems


ROS Other: All systems not noted in ROS Statement are negative.





<Radha Rod - Last Filed: 24 18:18>


ROS Other: All systems not noted in ROS Statement are negative.





<Leigh Duran - Last Filed: 24 01:36>


ROS Statement: 


Those systems with pertinent positive or pertinent negative responses have been 

documented in the HPI.








Past Medical History


Past Medical History: Diabetes Mellitus, Eye Disorder, Hyperlipidemia, 

Hypertension


Additional Past Medical History / Comment(s): 2/25/15  Pt presented to [facility] ER 

with substernal chest pain that started 24 hhours before coming to ER.  She noti

jolynn the chest pain when she woke up yesterday.  It is a heaviness and is 

intermittent with variable duration.  She also states she had alittle nausea 

with it.   Other HX:  Pt had recent (1/21/15)cataract surgery with lens implants

 bilaterally at [facility].  Hospital-post op she had low O2 saturations and was 

told she had a very narrow airway-instead of going home same day, she was in the

 hospital for a couple days until her saturations improved.  She was discharged 

with home O2 which she wears at 2L/NC at [facility] and was to follow up today for 

testing for sleep apnea.  Pt states she also has diarrhea fairly frequently. 

Also has hx of 3 ruptured cervical discs with surgery and bilateral retina re

pair.  2018 - intermittently in hospital for 3 months for fluid on lungs, had 

fluid removed


History of Any Multi-Drug Resistant Organisms: MRSA


Date of last positivie culture/infection: 


MDRO Source:: legs and breasts.


Past Surgical History: Bariatric Surgery, Cholecystectomy, Hysterectomy, Orth

opedic Surgery


Additional Past Surgical History / Comment(s): Bilateral cataract sx with lens 

implants, bilateral retinal repair. Cervical rodding for 3 ruptured discs.  

sleeve gastrectomy         3-1-21


Past Anesthesia/Blood Transfusion Reactions: Postoperative Nausea & Vomiting 

(PONV)


Additional Past Anesthesia/Blood Transfusion Reaction / Comment(s): Pt had 

cataract and lens implant at [facility] on 1/21/15 and afterwards she 

desaturated and was hospitalized.  She was told she has a very narrow airway.  

Pt has never recieved blood.


Past Psychological History: No Psychological Hx Reported


Smoking Status: Former smoker


Past Alcohol Use History: None Reported


Past Drug Use History: None Reported





- Past Family History


  ** Father


Family Medical History: Cancer, Myocardial Infarction (MI)


Additional Family Medical History / Comment(s): Father had 5 MI's and  of 

bone cancer.





  ** Mother


Family Medical History: Cancer


Additional Family Medical History / Comment(s): cervical cancer





<Radha Rod - Last Filed: 24 18:18>





General Exam


Limitations: no limitations


General appearance: alert, in no apparent distress


Head exam: Present: atraumatic, normocephalic, normal inspection


Eye exam: Present: normal appearance, PERRL, EOMI.  Absent: scleral icterus, 

conjunctival injection, periorbital swelling


ENT exam: Present: normal exam, mucous membranes moist


Neck exam: Present: normal inspection.  Absent: tenderness, meningismus, 

lymphadenopathy


Respiratory exam: Present: normal lung sounds bilaterally.  Absent: respiratory 

distress, wheezes, rales, rhonchi, stridor


Cardiovascular Exam: Present: regular rate, normal rhythm, normal heart sounds. 

 Absent: systolic murmur, diastolic murmur, rubs, gallop, clicks


GI/Abdominal exam: Present: soft, normal bowel sounds.  Absent: distended, 

tenderness, guarding, rebound, rigid


  ** Left


Lower Leg exam: Present: tenderness (Anterior), erythema.  Absent: swelling, 

laceration (Still), ecchymosis, palpable cord, Homans' sign


Neurovascular tendon exam: Present: no vascular compromise


Gait: observed and normal





  ** Right


Lower Leg exam: Present: tenderness (Posterior), erythema (Still).  Absent: 

laceration, ecchymosis, palpable cord, Homans' sign


Neurovascular tendon exam: Present: no vascular compromise.  Absent: pulse 

deficit


Back exam: Present: normal inspection


Neurological exam: Present: alert, oriented X3, CN II-XII intact


Skin exam: Present: warm, dry, intact, normal color.  Absent: rash





<Radha Rod - Last Filed: 24 18:18>





Course


                                   Vital Signs











  24





  17:30 18:57 20:14


 


Temperature 97.9 F 98 F 


 


Pulse Rate 87 81 77


 


Respiratory 18 18 18





Rate   


 


Blood Pressure 114/62 88/59 89/57


 


O2 Sat by Pulse 97 98 





Oximetry   














  24





  21:23 22:51 00:25


 


Temperature   


 


Pulse Rate 71 72 74


 


Respiratory  16 17





Rate   


 


Blood Pressure 111/65 104/51 86/65


 


O2 Sat by Pulse  99 99





Oximetry   














  24





  01:33


 


Temperature 98.1 F


 


Pulse Rate 72


 


Respiratory 18





Rate 


 


Blood Pressure 101/64


 


O2 Sat by Pulse 99





Oximetry 














Medical Decision Making





<Radha Rod - Last Filed: 24 18:18>





- Lab Data


Result diagrams: 


                                 24 19:28





                                 24 19:28





<Leigh Duran - Last Filed: 24 01:36>





- Medical Decision Making


Was pt. sent in by a medical professional or institution (APOORVA Maher, NP, urgent 

care, hospital, or nursing home...) When possible be specific


@ -Was advised by her primary care provider to report to the emergency 

department for evaluation via ultrasound for concern of a blood clot.


Did you speak to anyone other than the patient for history (EMS, parent, family,

 police, friend...)? What history was obtained from this source 


@ -[No]


Did you review nursing and triage notes (agree or disagree)? Why? 


@ -[I reviewed and agree with nursing and triage notes]


Were old charts reviewed (outside hosp., previous admission, EMS record, old 

EKG, old radiological studies, urgent care reports/EKG's, nursing home records)?

Report findings 


@ -[No old charts were reviewed]


Differential Diagnosis (chest pain, altered mental status, abdominal pain women,

abdominal pain men, vaginal bleeding, weakness, fever, dyspnea, syncope, 

headache, dizziness, GI bleed, back pain, seizure, CVA, palpatations, mental 

health, musculoskeletal)? 


@ -DVT, superficial thrombophlebitis, muscle strain, calf pain, this list is not

all inclusive.


EKG interpreted by me (3pts min.).


@ -None


X-rays interpreted by me (1pt min.).


@ -[None done]


CT interpreted by me (1pt min.).


@ -[None done]


U/S interpreted by me (1pt. min.).


@ -[None done]


What testing was considered but not performed or refused? (CT, X-rays, U/S, 

labs)? Why?


@ -[None]


What meds were considered but not given or refused? Why?


@ -[None]


Did you discuss the management of the patient with other professionals 

(professionals i.e. , PA, NP, lab, RT, psych nurse, , , 

teacher, , )? Give summary


@ -[No]


Was smoking cessation discussed for >3mins.?


@ -[No]


Was critical care preformed (if so, how long)?


@ -[No]


Were there social determinants of health that impacted care today? How? 

(Homelessness, low income, unemployed, alcoholism, drug addiction, transpor

tation, low edu. Level, literacy, decrease access to med. care, FDC, rehab)?


@ -[No]


Was there de-escalation of care discussed even if they declined (Discuss DNR or 

withdrawal of care, Hospice)? DNR status


@ -[No]


What co-morbidities impacted this encounter? (DM, HTN, Smoking, COPD, CAD, 

Cancer, CVA, ARF, Chemo, Hep., AIDS, mental health diagnosis, sleep apnea, 

morbid obesity)?


@ -[None]


Was patient admitted / discharged? Hospital course, mention meds given and 

route, prescriptions, significant lab abnormalities, going to OR and other 

pertinent info.


@ -[hospital course] 


Undiagnosed new problem with uncertain prognosis?


@ -[No]


Drug Therapy requiring intensive monitoring for toxicity (Heparin, Nitro, 

Insulin, Cardizem)?


@ -[No]


Were any procedures done?


@ -[No]


Diagnosis/symptom?


@ -[default]


Acute, or Chronic, or Acute on Chronic?


@ -[default]


Uncomplicated (without systemic symptoms) or Complicated (systemic symptoms)?


@ -[default]


Side effects of treatment?


@ -[No]


Exacerbation, Progression, or Severe Exacerbation?


@ -[No]


Poses a threat to life or bodily function? How? (Chest pain, USA, MI, pneumonia,

 PE, COPD, DKA, ARF, appy, cholecystitis, CVA, Diverticulitis, Homicidal, Reece

icidal, threat to staff... and all critical care pts)


@ -[No]


 (Thelmar,Radha)


Was pt. sent in by a medical professional or institution (, PA, NP, urgent 

care, hospital, or nursing home...) When possible be specific


@ -No


Did you speak to anyone other than the patient for history (EMS, parent, family,

 police, friend...)? What history was obtained from this source 


@ -No


Did you review nursing and triage notes (agree or disagree)? Why? 


@ -I reviewed and agree with nursing and triage notes


Were old charts reviewed (outside hosp., previous admission, EMS record, old EK

G, old radiological studies, urgent care reports/EKG's, nursing home records)? 

Report findings 


@ -No old charts were reviewed


Differential Diagnosis (chest pain, altered mental status, abdominal pain women,

 abdominal pain men, vaginal bleeding, weakness, fever, dyspnea, syncope, 

headache, dizziness, GI bleed, back pain, seizure, CVA, palpatations, mental 

health, musculoskeletal)? 


@ -DVT, superficial thrombophlebitis, muscle strain, calf pain, hyperglycemia, 

DKA, HHS, sepsis, this list is not all inclusive


EKG interpreted by me (3pts min.).


@ -None


X-rays interpreted by me (1pt min.).


@ -None done


CT interpreted by me (1pt min.).


@ -None done


U/S interpreted by me (1pt. min.).


@ -Ultrasound of bilateral lower extremities were negative for acute DVT


What testing was considered but not performed or refused? (CT, X-rays, U/S, 

labs)? Why?


@ -None


What meds were considered but not given or refused? Why?


@ -None


Did you discuss the management of the patient with other professionals (mireya bethea i.e., Dr., PA, NP, lab, RT, psych nurse, , , teacher, 

, )? Give summary


@ -No


Was smoking cessation discussed for >3mins.?


@ -No


Was critical care preformed (if so, how long)?


@ -No


Were there social determinants of health that impacted care today? How? 

(Homelessness, low income, unemployed, alcoholism, drug addiction, 

transportation, low edu. Level, literacy, decrease access to med. care, FDC, 

rehab)?


@ -No


Was there de-escalation of care discussed even if they declined (Discuss DNR or 

withdrawal of care, Hospice)? DNR status


@ -No


What co-morbidities impacted this encounter? (DM, HTN, Smoking, COPD, CAD, 

Cancer, CVA, ARF, Chemo, Hep., AIDS, mental health diagnosis, sleep apnea, 

morbid obesity)?


@ -None


Was patient admitted / discharged? Hospital course, mention meds given and 

route, prescriptions, significant lab abnormalities, going to OR and other 

pertinent info.


@ -Patient was discharged.  Patient was signed out to me by Radha Rod PA-C 

at 7 PM pending ultrasound results.  Patient was seen and evaluated for 

bilateral lower leg pain.  She was sent by her PCP for ultrasounds to rule out 

DVT.  Ultrasounds returned negative for DVT.  Blood pressure was low at 88/59, 

therefore blood glucose was taken and found to be 595.  Patient admitted she has

 not taken her insulin yet today.  Patient was given 1 L bolus at this time and 

lab work including CBC, CMP, acetone, lactic acid, venous blood gas, and serum 

osmolarity were taken to rule out DKA.  Lab work was remarkable for a sodium of 

125, chloride of 94, creatinine of 1.12, and glucose 580.  Acetone was negative 

and anion gap is 8.  VBG pCO2 was 55.  Unlikely DKA at this time, likely 

hyperglycemia due to missed medication.  Patient was given an additional liter 

bolus and 14 units of insulin.  Urine reveals 4+ glucose, negative for ketones, 

large leukocyte esterase, 8 red blood cells, and 40 white blood cells.  Patient 

denies any urinary symptoms.  Opted to wait for urine culture results for 

treatment.  Patient's blood glucose upon reevaluation was 295.  Patient would 

like to be discharged at this time.  I believe this is reasonable due to vitals 

are stable and patient is asymptomatic.  Blood pressure has increased to 101/64.

  Strict return/alarm symptoms discussed with patient and she shows 

understanding and agrees with plan.  Discussed importance of medication 

adherence.  Case discussed with my attending Dr. Porter.  Patient was 

discharged in stable condition.


Undiagnosed new problem with uncertain prognosis?


@ -No


Drug Therapy requiring intensive monitoring for toxicity (Heparin, Nitro, 

Insulin, Cardizem)?


@ -No


Were any procedures done?


@ -No


Diagnosis/symptom?


@ -Bilateral leg pain, hyperglycemia


Acute, or Chronic, or Acute on Chronic?


@ -Acute


Uncomplicated (without systemic symptoms) or Complicated (systemic symptoms)?


@ -Uncomplicated


Side effects of treatment?


@ -No


Exacerbation, Progression, or Severe Exacerbation?


@ -No


Poses a threat to life or bodily function? How? (Chest pain, USA, MI, pneumonia,

 PE, COPD, DKA, ARF, appy, cholecystitis, CVA, Diverticulitis, Homicidal, 

Suicidal, threat to staff... and all critical care pts)


@ -Unlikely at this time (Leigh Duran)





- Lab Data


                                   Lab Results











  24 Range/Units





  19:03 19:28 19:28 


 


WBC   8.9   (3.8-10.6)  k/uL


 


RBC   3.97   (3.80-5.40)  m/uL


 


Hgb   11.0 L   (11.4-16.0)  gm/dL


 


Hct   34.1   (34.0-46.0)  %


 


MCV   86.0   (80.0-100.0)  fL


 


MCH   27.7   (25.0-35.0)  pg


 


MCHC   32.2   (31.0-37.0)  g/dL


 


RDW   14.4   (11.5-15.5)  %


 


Plt Count   284   (150-450)  k/uL


 


MPV   9.6   


 


Neutrophils %   53   %


 


Lymphocytes %   36   %


 


Monocytes %   5   %


 


Eosinophils %   3   %


 


Basophils %   0   %


 


Neutrophils #   4.7   (1.3-7.7)  k/uL


 


Lymphocytes #   3.2   (1.0-4.8)  k/uL


 


Monocytes #   0.5   (0-1.0)  k/uL


 


Eosinophils #   0.3   (0-0.7)  k/uL


 


Basophils #   0.0   (0-0.2)  k/uL


 


VBG pH     (7.31-7.41)  


 


VBG pCO2     (37-51)  mmHg


 


VBG HCO3     (24-28)  mmol/L


 


Sodium     (137-145)  mmol/L


 


Potassium     (3.5-5.1)  mmol/L


 


Chloride     ()  mmol/L


 


Carbon Dioxide     (22-30)  mmol/L


 


Anion Gap     mmol/L


 


BUN     (7-17)  mg/dL


 


Creatinine     (0.52-1.04)  mg/dL


 


Est GFR (CKD-EPI)AfAm     (>60 ml/min/1.73 sqM)  


 


Est GFR (CKD-EPI)NonAf     (>60 ml/min/1.73 sqM)  


 


Glucose     (74-99)  mg/dL


 


POC Glucose (mg/dL)  595 H*    ()  mg/dL


 


POC Glu Operater ID  Eliot Pacheco    


 


Plasma Lactic Acid Dilan     (0.7-2.0)  mmol/L


 


Calcium     (8.4-10.2)  mg/dL


 


Total Bilirubin     (0.2-1.3)  mg/dL


 


AST     (14-36)  U/L


 


ALT     (4-34)  U/L


 


Alkaline Phosphatase     ()  U/L


 


Total Protein     (6.3-8.2)  g/dL


 


Albumin     (3.5-5.0)  g/dL


 


Urine Color    Colorless  


 


Urine Appearance    Cloudy H  (Clear)  


 


Urine pH    5.0  (5.0-8.0)  


 


Ur Specific Gravity    1.026  (1.001-1.035)  


 


Urine Protein    Negative  (Negative)  


 


Urine Glucose (UA)    4+ H  (Negative)  


 


Urine Ketones    Negative  (Negative)  


 


Urine Blood    Small H  (Negative)  


 


Urine Nitrite    Negative  (Negative)  


 


Urine Bilirubin    Negative  (Negative)  


 


Urine Urobilinogen    <2.0  (<2.0)  mg/dL


 


Ur Leukocyte Esterase    Large H  (Negative)  


 


Urine RBC    8 H  (0-5)  /hpf


 


Urine WBC    40 H  (0-5)  /hpf


 


Ur Squamous Epith Cells    1  (0-4)  /hpf


 


Urine Mucus    Rare H  (None)  /hpf


 


Urine Yeast (Budding)    Few H  (None)  /hpf


 


Acetone, Qual     (Negative)  














  24 Range/Units





  19:28 19:28 21:46 


 


WBC     (3.8-10.6)  k/uL


 


RBC     (3.80-5.40)  m/uL


 


Hgb     (11.4-16.0)  gm/dL


 


Hct     (34.0-46.0)  %


 


MCV     (80.0-100.0)  fL


 


MCH     (25.0-35.0)  pg


 


MCHC     (31.0-37.0)  g/dL


 


RDW     (11.5-15.5)  %


 


Plt Count     (150-450)  k/uL


 


MPV     


 


Neutrophils %     %


 


Lymphocytes %     %


 


Monocytes %     %


 


Eosinophils %     %


 


Basophils %     %


 


Neutrophils #     (1.3-7.7)  k/uL


 


Lymphocytes #     (1.0-4.8)  k/uL


 


Monocytes #     (0-1.0)  k/uL


 


Eosinophils #     (0-0.7)  k/uL


 


Basophils #     (0-0.2)  k/uL


 


VBG pH    7.31  (7.31-7.41)  


 


VBG pCO2    55 H  (37-51)  mmHg


 


VBG HCO3    28  (24-28)  mmol/L


 


Sodium  125 L    (137-145)  mmol/L


 


Potassium  4.0    (3.5-5.1)  mmol/L


 


Chloride  94 L    ()  mmol/L


 


Carbon Dioxide  23    (22-30)  mmol/L


 


Anion Gap  8    mmol/L


 


BUN  14    (7-17)  mg/dL


 


Creatinine  1.12 H    (0.52-1.04)  mg/dL


 


Est GFR (CKD-EPI)AfAm  63    (>60 ml/min/1.73 sqM)  


 


Est GFR (CKD-EPI)NonAf  54    (>60 ml/min/1.73 sqM)  


 


Glucose  580 H*    (74-99)  mg/dL


 


POC Glucose (mg/dL)     ()  mg/dL


 


POC Glu Operater ID     


 


Plasma Lactic Acid Dilan   1.7   (0.7-2.0)  mmol/L


 


Calcium  8.6    (8.4-10.2)  mg/dL


 


Total Bilirubin  0.5    (0.2-1.3)  mg/dL


 


AST  20    (14-36)  U/L


 


ALT  15    (4-34)  U/L


 


Alkaline Phosphatase  118    ()  U/L


 


Total Protein  6.1 L    (6.3-8.2)  g/dL


 


Albumin  3.7    (3.5-5.0)  g/dL


 


Urine Color     


 


Urine Appearance     (Clear)  


 


Urine pH     (5.0-8.0)  


 


Ur Specific Gravity     (1.001-1.035)  


 


Urine Protein     (Negative)  


 


Urine Glucose (UA)     (Negative)  


 


Urine Ketones     (Negative)  


 


Urine Blood     (Negative)  


 


Urine Nitrite     (Negative)  


 


Urine Bilirubin     (Negative)  


 


Urine Urobilinogen     (<2.0)  mg/dL


 


Ur Leukocyte Esterase     (Negative)  


 


Urine RBC     (0-5)  /hpf


 


Urine WBC     (0-5)  /hpf


 


Ur Squamous Epith Cells     (0-4)  /hpf


 


Urine Mucus     (None)  /hpf


 


Urine Yeast (Budding)     (None)  /hpf


 


Acetone, Qual  Negative    (Negative)  














  24 Range/Units





  00:14 


 


WBC   (3.8-10.6)  k/uL


 


RBC   (3.80-5.40)  m/uL


 


Hgb   (11.4-16.0)  gm/dL


 


Hct   (34.0-46.0)  %


 


MCV   (80.0-100.0)  fL


 


MCH   (25.0-35.0)  pg


 


MCHC   (31.0-37.0)  g/dL


 


RDW   (11.5-15.5)  %


 


Plt Count   (150-450)  k/uL


 


MPV   


 


Neutrophils %   %


 


Lymphocytes %   %


 


Monocytes %   %


 


Eosinophils %   %


 


Basophils %   %


 


Neutrophils #   (1.3-7.7)  k/uL


 


Lymphocytes #   (1.0-4.8)  k/uL


 


Monocytes #   (0-1.0)  k/uL


 


Eosinophils #   (0-0.7)  k/uL


 


Basophils #   (0-0.2)  k/uL


 


VBG pH   (7.31-7.41)  


 


VBG pCO2   (37-51)  mmHg


 


VBG HCO3   (24-28)  mmol/L


 


Sodium   (137-145)  mmol/L


 


Potassium   (3.5-5.1)  mmol/L


 


Chloride   ()  mmol/L


 


Carbon Dioxide   (22-30)  mmol/L


 


Anion Gap   mmol/L


 


BUN   (7-17)  mg/dL


 


Creatinine   (0.52-1.04)  mg/dL


 


Est GFR (CKD-EPI)AfAm   (>60 ml/min/1.73 sqM)  


 


Est GFR (CKD-EPI)NonAf   (>60 ml/min/1.73 sqM)  


 


Glucose   (74-99)  mg/dL


 


POC Glucose (mg/dL)  295 H  ()  mg/dL


 


POC Glu Operater ID  John Ham  


 


Plasma Lactic Acid Dilan   (0.7-2.0)  mmol/L


 


Calcium   (8.4-10.2)  mg/dL


 


Total Bilirubin   (0.2-1.3)  mg/dL


 


AST   (14-36)  U/L


 


ALT   (4-34)  U/L


 


Alkaline Phosphatase   ()  U/L


 


Total Protein   (6.3-8.2)  g/dL


 


Albumin   (3.5-5.0)  g/dL


 


Urine Color   


 


Urine Appearance   (Clear)  


 


Urine pH   (5.0-8.0)  


 


Ur Specific Gravity   (1.001-1.035)  


 


Urine Protein   (Negative)  


 


Urine Glucose (UA)   (Negative)  


 


Urine Ketones   (Negative)  


 


Urine Blood   (Negative)  


 


Urine Nitrite   (Negative)  


 


Urine Bilirubin   (Negative)  


 


Urine Urobilinogen   (<2.0)  mg/dL


 


Ur Leukocyte Esterase   (Negative)  


 


Urine RBC   (0-5)  /hpf


 


Urine WBC   (0-5)  /hpf


 


Ur Squamous Epith Cells   (0-4)  /hpf


 


Urine Mucus   (None)  /hpf


 


Urine Yeast (Budding)   (None)  /hpf


 


Acetone, Qual   (Negative)  














Disposition





<Radha Rod - Last Filed: 24 18:18>


Is patient prescribed a controlled substance at d/c from ED?: No


Time of Disposition: 01:33





<Leigh Duran - Last Filed: 24 01:36>


Clinical Impression: 


 Hyperglycemia, Bilateral leg pain, Asymptomatic bacteriuria





Disposition: HOME SELF-CARE


Condition: Stable


Instructions (If sedation given, give patient instructions):  Diabetic 

Hyperglycemia (ED)


Additional Instructions: 


Follow-up with PCP in 1 to 3 days for reevaluation.  Please return to the E

mergency Department if symptoms worsen or any other concerns.


Referrals: 


Emily Capps MD [Primary Care Provider] - 1-2 days

## 2024-07-03 NOTE — US
EXAMINATION TYPE: US venous doppler duplex LE 

 

DATE OF EXAM: 7/3/2024 6:11 PM

 

COMPARISON: NONE

 

CLINICAL INDICATION: Female, 59 years old with history of pain, distal erythema; Patient states she h
ad a fall, patient now has pain behind knee. Patient has no hx of DVT. Patient not on blood thinners

 

SIDE PERFORMED: Bilateral  

 

TECHNIQUE:  The lower extremity deep venous system is examined utilizing real time linear array sonog
prince with graded compression, doppler sonography and color-flow sonography.

 

VESSELS IMAGED:

Common Femoral Vein

Deep Femoral Vein

Greater Saphenous Vein *

Femoral Vein

Popliteal Vein

Small Saphenous Vein *

Proximal Calf Veins

(* superficial vessels)

 

 

 

Right Leg:  Appears negative for DVT 

 

Left Leg:  Appears negative for DVT

 

 

 

IMPRESSION:

No evidence for DVT within the bilateral lower extremities imaged from the groin to the upper calves.

## 2024-07-04 VITALS
TEMPERATURE: 98.1 F | HEART RATE: 72 BPM | DIASTOLIC BLOOD PRESSURE: 64 MMHG | SYSTOLIC BLOOD PRESSURE: 101 MMHG | RESPIRATION RATE: 18 BRPM

## 2024-07-04 LAB — GLUCOSE BLD-MCNC: 295 MG/DL (ref 70–110)

## 2024-07-15 ENCOUNTER — HOSPITAL ENCOUNTER (OUTPATIENT)
Dept: HOSPITAL 47 - RADUSWWP | Age: 59
Discharge: HOME | End: 2024-07-15
Attending: INTERNAL MEDICINE
Payer: MEDICARE

## 2024-07-15 DIAGNOSIS — M54.9: ICD-10-CM

## 2024-07-15 DIAGNOSIS — I70.213: Primary | ICD-10-CM

## 2024-07-15 DIAGNOSIS — M79.605: ICD-10-CM

## 2024-07-15 PROCEDURE — 93922 UPR/L XTREMITY ART 2 LEVELS: CPT

## 2024-07-15 PROCEDURE — 72100 X-RAY EXAM L-S SPINE 2/3 VWS: CPT

## 2024-07-15 NOTE — US
EXAMINATION TYPE: US arterial LE single level

 

DATE OF EXAM: 7/15/2024 2:12 PM

 

CLINICAL INDICATION: Female, 59 years old with history of I70.213 ARTERIOSCLEROSIS; Claudication

 

History of:

Smoker: Previous 15 yrs ago 

Hypertension:  Takes medication

Diabetic:  Yes

Hyperlipidemia:  Yes

TIA/CVA:  No

Previous Vascular Surgery:  No

CAD:  no

MI:  No

Vascular Ulcers:  no

Claudication:  Yes

Gangrene:  No

 

Right Brachial Pressure:  188

Left Brachial Pressure:  146

 

Ankle-Brachial Indices:

Right: 0.94

Left: Could not occlude

 

 

Greater than 20mmHg difference noted between the right and left brachial pressures. Unable to occlude
 the left posterior tibial artery and left dorsal pedal artery. 

 

IMPRESSION: 

1. Suspect moderate to severe plaque disease left lower extremity.

## 2024-07-15 NOTE — XR
EXAMINATION TYPE: XR lumbar spine 2 or 3V

 

DATE OF EXAM: 7/15/2024

 

CLINICAL HISTORY: pain

 

TECHNIQUE: Three views of the lumbar spine are submitted.

 

COMPARISON: None.

 

FINDINGS:  

There are 5 lumbar type vertebral bodies identified.  The lumbar spine shows satisfactory alignment w
ithout evidence of acute fracture or dislocation. Vertebral body heights are within normal limits. Mo
derate-appearing superior endplate loss of height L4 with large ventral spur. Moderate degenerative d
isc space narrowing at L 3- 4 and L5-S1 with mild narrowing at L4-5. The overlying soft tissue appear
s unremarkable.

 

IMPRESSION:  

No acute fracture or dislocation is seen in the lumbar spine.

 

ICD 10 NO FRACTURE, INITIAL EVALUATION

## 2024-07-15 NOTE — XR
EXAMINATION TYPE: XR knee limited bilateral

 

DATE OF EXAM: 7/15/2024

 

CLINICAL HISTORY: pain

 

TECHNIQUE:  Three views of the right knee are obtained.

 

COMPARISON: None.

 

FINDINGS:  There is no acute fracture/dislocation.  The tri-compartment joint spaces appear mildly na
rrowed. The overlying soft tissue appears unremarkable.

 

IMPRESSION:  There is no acute fracture or dislocation.ICD 10 NO FRACTURE, INITIAL EVALUATION

## 2024-07-31 ENCOUNTER — HOSPITAL ENCOUNTER (OUTPATIENT)
Dept: HOSPITAL 47 - EC | Age: 59
Setting detail: OBSERVATION
LOS: 2 days | Discharge: HOME | End: 2024-08-02
Attending: INTERNAL MEDICINE | Admitting: INTERNAL MEDICINE
Payer: MEDICARE

## 2024-07-31 DIAGNOSIS — E78.5: ICD-10-CM

## 2024-07-31 DIAGNOSIS — Z80.49: ICD-10-CM

## 2024-07-31 DIAGNOSIS — F32.A: ICD-10-CM

## 2024-07-31 DIAGNOSIS — Z79.899: ICD-10-CM

## 2024-07-31 DIAGNOSIS — G25.0: ICD-10-CM

## 2024-07-31 DIAGNOSIS — Z79.84: ICD-10-CM

## 2024-07-31 DIAGNOSIS — N39.490: ICD-10-CM

## 2024-07-31 DIAGNOSIS — R29.6: ICD-10-CM

## 2024-07-31 DIAGNOSIS — I10: ICD-10-CM

## 2024-07-31 DIAGNOSIS — E55.9: ICD-10-CM

## 2024-07-31 DIAGNOSIS — Z96.1: ICD-10-CM

## 2024-07-31 DIAGNOSIS — Z87.891: ICD-10-CM

## 2024-07-31 DIAGNOSIS — K21.9: ICD-10-CM

## 2024-07-31 DIAGNOSIS — J01.90: ICD-10-CM

## 2024-07-31 DIAGNOSIS — E11.40: ICD-10-CM

## 2024-07-31 DIAGNOSIS — Z86.2: ICD-10-CM

## 2024-07-31 DIAGNOSIS — F41.9: ICD-10-CM

## 2024-07-31 DIAGNOSIS — E11.21: ICD-10-CM

## 2024-07-31 DIAGNOSIS — G93.41: Primary | ICD-10-CM

## 2024-07-31 DIAGNOSIS — N17.9: ICD-10-CM

## 2024-07-31 DIAGNOSIS — E66.01: ICD-10-CM

## 2024-07-31 DIAGNOSIS — G89.4: ICD-10-CM

## 2024-07-31 DIAGNOSIS — Z90.710: ICD-10-CM

## 2024-07-31 DIAGNOSIS — Z82.49: ICD-10-CM

## 2024-07-31 DIAGNOSIS — N39.0: ICD-10-CM

## 2024-07-31 DIAGNOSIS — Z79.4: ICD-10-CM

## 2024-07-31 LAB
ALBUMIN SERPL-MCNC: 4.1 G/DL (ref 3.5–5)
ALP SERPL-CCNC: 98 U/L (ref 38–126)
ALT SERPL-CCNC: 29 U/L (ref 4–34)
ANION GAP SERPL CALC-SCNC: 7 MMOL/L
APTT BLD: 22.9 SEC (ref 22–30)
AST SERPL-CCNC: 47 U/L (ref 14–36)
BASOPHILS # BLD AUTO: 0.1 K/UL (ref 0–0.2)
BASOPHILS NFR BLD AUTO: 1 %
BUN SERPL-SCNC: 31 MG/DL (ref 7–17)
CALCIUM SPEC-MCNC: 9.1 MG/DL (ref 8.4–10.2)
CHLORIDE SERPL-SCNC: 103 MMOL/L (ref 98–107)
CO2 SERPL-SCNC: 29 MMOL/L (ref 22–30)
EOSINOPHIL # BLD AUTO: 0.3 K/UL (ref 0–0.7)
EOSINOPHIL NFR BLD AUTO: 3 %
ERYTHROCYTE [DISTWIDTH] IN BLOOD BY AUTOMATED COUNT: 4.32 M/UL (ref 3.8–5.4)
ERYTHROCYTE [DISTWIDTH] IN BLOOD: 15 % (ref 11.5–15.5)
GLUCOSE BLD-MCNC: 138 MG/DL (ref 70–110)
GLUCOSE SERPL-MCNC: 75 MG/DL (ref 74–99)
GLUCOSE UR QL: (no result)
HCT VFR BLD AUTO: 37.5 % (ref 34–46)
HGB BLD-MCNC: 12.2 GM/DL (ref 11.4–16)
INR PPP: 0.9 (ref ?–1.2)
LYMPHOCYTES # SPEC AUTO: 3.6 K/UL (ref 1–4.8)
LYMPHOCYTES NFR SPEC AUTO: 39 %
MAGNESIUM SPEC-SCNC: 2.4 MG/DL (ref 1.6–2.3)
MCH RBC QN AUTO: 28.2 PG (ref 25–35)
MCHC RBC AUTO-ENTMCNC: 32.4 G/DL (ref 31–37)
MCV RBC AUTO: 86.8 FL (ref 80–100)
MONOCYTES # BLD AUTO: 0.6 K/UL (ref 0–1)
MONOCYTES NFR BLD AUTO: 7 %
NEUTROPHILS # BLD AUTO: 4.4 K/UL (ref 1.3–7.7)
NEUTROPHILS NFR BLD AUTO: 48 %
PH UR: 5 [PH] (ref 5–8)
PLATELET # BLD AUTO: 266 K/UL (ref 150–450)
POTASSIUM SERPL-SCNC: 3.5 MMOL/L (ref 3.5–5.1)
PROT SERPL-MCNC: 6.8 G/DL (ref 6.3–8.2)
PT BLD: 10.1 SEC (ref 10–12.5)
RBC UR QL: 70 /HPF (ref 0–5)
SODIUM SERPL-SCNC: 139 MMOL/L (ref 137–145)
SP GR UR: 1.02 (ref 1–1.03)
SQUAMOUS UR QL AUTO: 1 /HPF (ref 0–4)
UROBILINOGEN UR QL STRIP: <2 MG/DL (ref ?–2)
WBC # BLD AUTO: 9.2 K/UL (ref 3.8–10.6)
WBC #/AREA URNS HPF: >182 /HPF (ref 0–5)

## 2024-07-31 PROCEDURE — 80053 COMPREHEN METABOLIC PANEL: CPT

## 2024-07-31 PROCEDURE — 87324 CLOSTRIDIUM AG IA: CPT

## 2024-07-31 PROCEDURE — 85730 THROMBOPLASTIN TIME PARTIAL: CPT

## 2024-07-31 PROCEDURE — 85610 PROTHROMBIN TIME: CPT

## 2024-07-31 PROCEDURE — 87636 SARSCOV2 & INF A&B AMP PRB: CPT

## 2024-07-31 PROCEDURE — 80048 BASIC METABOLIC PNL TOTAL CA: CPT

## 2024-07-31 PROCEDURE — 70450 CT HEAD/BRAIN W/O DYE: CPT

## 2024-07-31 PROCEDURE — 36415 COLL VENOUS BLD VENIPUNCTURE: CPT

## 2024-07-31 PROCEDURE — 84484 ASSAY OF TROPONIN QUANT: CPT

## 2024-07-31 PROCEDURE — 96361 HYDRATE IV INFUSION ADD-ON: CPT

## 2024-07-31 PROCEDURE — 97162 PT EVAL MOD COMPLEX 30 MIN: CPT

## 2024-07-31 PROCEDURE — 84443 ASSAY THYROID STIM HORMONE: CPT

## 2024-07-31 PROCEDURE — 93005 ELECTROCARDIOGRAM TRACING: CPT

## 2024-07-31 PROCEDURE — 96360 HYDRATION IV INFUSION INIT: CPT

## 2024-07-31 PROCEDURE — 83605 ASSAY OF LACTIC ACID: CPT

## 2024-07-31 PROCEDURE — 83735 ASSAY OF MAGNESIUM: CPT

## 2024-07-31 PROCEDURE — 85025 COMPLETE CBC W/AUTO DIFF WBC: CPT

## 2024-07-31 PROCEDURE — 84100 ASSAY OF PHOSPHORUS: CPT

## 2024-07-31 PROCEDURE — 99285 EMERGENCY DEPT VISIT HI MDM: CPT

## 2024-07-31 PROCEDURE — 71046 X-RAY EXAM CHEST 2 VIEWS: CPT

## 2024-07-31 PROCEDURE — 81001 URINALYSIS AUTO W/SCOPE: CPT

## 2024-07-31 RX ADMIN — CEFAZOLIN SCH MLS/HR: 330 INJECTION, POWDER, FOR SOLUTION INTRAMUSCULAR; INTRAVENOUS at 20:30

## 2024-07-31 RX ADMIN — PREGABALIN SCH MG: 75 CAPSULE ORAL at 20:32

## 2024-07-31 RX ADMIN — ATORVASTATIN CALCIUM SCH MG: 40 TABLET, FILM COATED ORAL at 20:32

## 2024-07-31 RX ADMIN — ASPIRIN 81 MG CHEWABLE TABLET SCH MG: 81 TABLET CHEWABLE at 20:32

## 2024-07-31 NOTE — CT
EXAMINATION TYPE: CT brain wo con

CT DLP: 1149.4 mGycm, Automated exposure control for dose reduction was used.

 

DATE OF EXAM: 7/31/2024 7:07 PM

 

COMPARISON: CT head 4/30/2024.

 

CLINICAL INDICATION:Female, 59 years old with history of multiple falls, tremor, slurred speech,

 

TECHNIQUE: 

Brain: Axial CT images of the brain were obtained with coronal and sagittal reformats created and rev
iewed.

Contrast used: None.

Oral contrast used: None.

 

FINDINGS:

 

Brain:

Extra-axial spaces: No abnormal extra-axial fluid collections.

Ventricular system: Within normal limits

Cerebral parenchyma: No acute intraparenchymal hemorrhage or mass effect.  The gray-white junction is
 well differentiated.     

Cerebellum: Hypodensities noted in the cerebellum is felt related to beam artifact.

Mass effect: No evidence of midline shift.

Intracranial vasculature: Atherosclerotic calcifications of the intracranial vessels.

Soft tissues: Normal.

Calvarium/osseous structures: No depressed skull fracture.

Paranasal sinuses and mastoid air cells: Clear.

Visualized orbits: Bilateral aphakia

 

 

IMPRESSION:

No acute intracranial process. Follow-up evaluation with MRI as clinically indicated.

## 2024-07-31 NOTE — ED
General Adult HPI





- General


Chief complaint: Weakness


Stated complaint: weakness


Time Seen by Provider: 24 16:49


Source: patient


Mode of arrival: wheelchair


Limitations: no limitations





- History of Present Illness


Initial comments: 





59-year-old female with past medical history of diabetes, hypertension, 

hyperlipidemia who presents emergency department for neurologic assessment.  

Daughter is at bedside and helps read the history.  States that the patient has 

been previously hospitalized for similar complaint however symptoms appear to be

getting worse.  She states that she has had multiple falls over the past couple 

of months.  She had a fall last week as well as 1 yesterday.  The patient 

reports to weakness in her lower extremities that causes them to give out.  She 

denies having pain.  She also has worsening tremor of her mouth and upper 

extremities.  Daughter is concerned for her safety as the falls have become more

frequent.  She denies headache or visual changes.  She denies any recent head 

injury.  No use of blood thinners.  No seizure-like activity.  No recent 

medication changes.  Patient has concerns for Parkinson's.  Denies any changes 

in her bowel or bladder habits.  No other alleviating, precipitating or modify

ing factors





- Related Data


                                Home Medications











 Medication  Instructions  Recorded  Confirmed


 


Montelukast [Singulair] 10 mg PO DAILY 18


 


Mirabegron [Myrbetriq] 50 mg PO DAILY 20


 


Aspirin EC [Ecotrin Low Dose] 81 mg PO HS 21


 


Empagliflozin [Jardiance] 10 mg PO DAILY 22


 


Ergocalciferol [Vitamin D2 (1250 1,250 mcg PO Q14D 22





Mcg = 84045 Iu)]   


 


Escitalopram [Lexapro] 20 mg PO DAILY 24


 


Insuln Asp Prt/Insulin Aspart 20 unit SQ AC-SUPPER 24





[NovoLOG MIX 70-30 VIAL]   


 


Insuln Asp Prt/Insulin Aspart 40 unit SQ -BRKFST 24





[NovoLOG MIX 70-30 VIAL]   


 


Omeprazole 40 mg PO DAILY 24


 


Rosuvastatin [Crestor] 20 mg PO HS 24


 


Semaglutide [Ozempic] 1 mg SQ TU 24


 


Baclofen [Lioresal] 5 mg PO BID PRN 24


 


HYDROcodone/APAP 5-325MG [Norco 1 tab PO QID PRN 24





5-325]   


 


buPROPion XL [Wellbutrin XL] 150 mg PO DAILY 24








                                  Previous Rx's











 Medication  Instructions  Recorded


 


Loratadine [Claritin] 10 mg PO DAILY #30 tab 24


 


Pregabalin [Lyrica] 150 mg PO BID #0 24


 


Cefuroxime [Ceftin] 250 mg PO BID 5 Days #10 tab 24


 


Midodrine [ProAmatine] 5 mg PO BID-W/MEALS #60 tab 24


 


lisinopriL [Zestril] 5 mg PO DAILY #30 tab 24











                                    Allergies











Allergy/AdvReac Type Severity Reaction Status Date / Time


 


Sulfa (Sulfonamide Allergy  Rash, Verified 24 18:55





Antibiotics)   hives,  





   swelling  














Review of Systems


ROS Statement: 


Those systems with pertinent positive or pertinent negative responses have been 

documented in the HPI.





ROS Other: All systems not noted in ROS Statement are negative.





Past Medical History


Past Medical History: Diabetes Mellitus, Eye Disorder, Hyperlipidemia, 

Hypertension


Additional Past Medical History / Comment(s): 2/25/15  Pt presented to [facility] ER 

with substernal chest pain that started 24 hhours before coming to ER.  She 

noticed the chest pain when she woke up yesterday.  It is a heaviness and is 

intermittent with variable duration.  She also states she had alittle nausea 

with it.   Other HX:  Pt had recent (1/21/15)cataract surgery with lens implants

bilaterally at [facility].  Hospital-post op she had low O2 saturations and was 

told she had a very narrow airway-instead of going home same day, she was in the

hospital for a couple days until her saturations improved.  She was discharged 

with home O2 which she wears at 2L/NC at nite and was to follow up today for 

testing for sleep apnea.  Pt states she also has diarrhea fairly frequently. 

Also has hx of 3 ruptured cervical discs with surgery and bilateral retina 

repair.  2018 - intermittently in hospital for 3 months for fluid on lungs, had 

fluid removed


History of Any Multi-Drug Resistant Organisms: MRSA


Date of last positivie culture/infection: 


MDRO Source:: legs and breasts.


Past Surgical History: Bariatric Surgery, Cholecystectomy, Hysterectomy, 

Orthopedic Surgery


Additional Past Surgical History / Comment(s): Bilateral cataract sx with lens 

implants, bilateral retinal repair. Cervical rodding for 3 ruptured discs.  

sleeve gastrectomy         3-1-21


Past Anesthesia/Blood Transfusion Reactions: Postoperative Nausea & Vomiting 

(PONV)


Additional Past Anesthesia/Blood Transfusion Reaction / Comment(s): Pt had 

cataract and lens implant at [facility] on 1/21/15 and afterwards she 

desaturated and was hospitalized.  She was told she has a very narrow airway.  

Pt has never recieved blood.


Past Psychological History: No Psychological Hx Reported


Smoking Status: Former smoker


Past Alcohol Use History: None Reported


Past Drug Use History: None Reported





- Past Family History


  ** Father


Family Medical History: Cancer, Myocardial Infarction (MI)


Additional Family Medical History / Comment(s): Father had 5 MI's and  of 

bone cancer.





  ** Mother


Family Medical History: Cancer


Additional Family Medical History / Comment(s): cervical cancer





General Exam


Limitations: no limitations


General appearance: alert, in no apparent distress


Head exam: Present: atraumatic, normocephalic, normal inspection


Eye exam: Present: normal appearance, PERRL, EOMI.  Absent: scleral icterus, 

conjunctival injection, periorbital swelling


ENT exam: Present: normal exam, mucous membranes moist


Neck exam: Present: normal inspection.  Absent: tenderness, meningismus, 

lymphadenopathy


Respiratory exam: Present: normal lung sounds bilaterally.  Absent: respiratory 

distress, wheezes, rales, rhonchi, stridor


Cardiovascular Exam: Present: regular rate, normal rhythm, normal heart sounds. 

Absent: systolic murmur, diastolic murmur, rubs, gallop, clicks


GI/Abdominal exam: Present: soft, normal bowel sounds.  Absent: distended, 

tenderness, guarding, rebound, rigid


Extremities exam: Present: full ROM, normal capillary refill, other (Appears to 

have intention tremor bilaterally).  Absent: tenderness, pedal edema, joint 

swelling, calf tenderness


Back exam: Present: normal inspection


Neurological exam: Present: alert, oriented X3, CN II-XII intact


Psychiatric exam: Present: normal affect, normal mood


Skin exam: Present: warm, dry, intact, normal color.  Absent: rash





Course


                                   Vital Signs











  24





  16:00 17:07 19:57


 


Temperature 97.8 F  


 


Pulse Rate 68 73 75


 


Respiratory 18 16 18





Rate   


 


Blood Pressure 93/43 110/50 104/42


 


O2 Sat by Pulse 96 99 100





Oximetry   














Medical Decision Making





- Medical Decision Making





Was pt. sent in by a medical professional or institution (, PA, NP, urgent 

care, hospital, or nursing home...) When possible be specific


@  -No


Did you speak to anyone other than the patient for history (EMS, parent, family,

police, friend...)? What history was obtained from this source 


@  -Spoke with the daughter for history


Did you review nursing and triage notes (agree or disagree)?  Why? 


@  -I reviewed and agree with nursing and triage notes


Were old charts reviewed (outside hosp., previous admission, EMS record, old 

EKG, old radiological studies, urgent care reports/EKG's, nursing home records)?

Report findings 


@  -No old charts were reviewed


Differential Diagnosis (chest pain, altered mental status, abdominal pain women,

abdominal pain men, vaginal bleeding, weakness, fever, dyspnea, syncope, 

headache, dizziness, GI bleed, back pain, seizure, CVA, palpatations, mental 

health, musculoskeletal)? 


@  -Intention tremor, Parkinson's, MS, CVA


EKG interpreted by me (3pts min.).


@  -Yes and demonstrates sinus rhythm with a rate of 72.  OH interval 187.  QRS 

105.  QTc of 461.  Q wave in lead III.  No acute ST segment elevations


X-rays interpreted by me (1pt min.).


@  -Yes and demonstrates no acute process


CT interpreted by me (1pt min.).


@  -Yes and demonstrates no acute process


U/S interpreted by me (1pt. min.).


@  -None done


What testing was considered but not performed or refused? (CT, X-rays, U/S, 

labs)? Why?


@  -None


What meds were considered but not given or refused? Why?


@  -None


Did you discuss the management of the patient with other professionals 

(professionals i.e. , PA, NP, lab, RT, psych nurse, , , 

teacher, , )? Give summary


@  -Spoke with Dr. Jones for admission


Was smoking cessation discussed for >3mins.?


@  -No


Was critical care preformed (if so, how long)?


@  -No


Were there social determinants of health that impacted care today? How? 

(Homelessness, low income, unemployed, alcoholism, drug addiction, transpo

rtation, low edu. Level, literacy, decrease access to med. care, assisted, rehab)?


@  -No


Was there de-escalation of care discussed even if they declined (Discuss DNR or 

withdrawal of care, Hospice)? DNR status


@  -No


What co-morbidities impacted this encounter? (DM, HTN, Smoking, COPD, CAD, 

Cancer, CVA, ARF, Chemo, Hep., AIDS, mental health diagnosis, sleep apnea, 

morbid obesity)?


@  -Obesity, diabetes


Was patient admitted / discharged? Hospital course, mention meds given and 

route, prescriptions, significant lab abnormalities, going to OR and other 

pertinent info.


@  -Upon arrival patient seen and evaluated in room 15.  Thorough history and 

physical exam was performed.  IV was established.  Laboratory study reduction.  

CT of the brain was performed.  Upon return the results they are discussed with 

the patient.  Daughter is concerned about the patient's safety due to falls.  

Called and spoke with Dr. Drake was agreeable to admit the patient for 

neurology consultation


Undiagnosed new problem with uncertain prognosis?


@  -Yes


Drug Therapy requiring intensive monitoring for toxicity (Heparin, Nitro, 

Insulin, Cardizem)?


@  -No


Were any procedures done?


@  -No


Diagnosis/symptom?


@  -Acute tremor, multiple falls


Acute, or Chronic, or Acute on Chronic?


@  -Acute


Uncomplicated (without systemic symptoms) or Complicated (systemic symptoms)?


@  -Complicated


Side effects of treatment?


@  -No


Exacerbation, Progression, or Severe Exacerbation?


@  -No


Poses a threat to life or bodily function? How? (Chest pain, USA, MI, pneumonia,

PE, COPD, DKA, ARF, appy, cholecystitis, CVA, Diverticulitis, Homicidal, 

Suicidal, threat to staff... and all critical care pts)


@  -No








- Lab Data


Result diagrams: 


                                 24 05:40





                                 24 05:40


                                   Lab Results











  24 Range/Units





  16:40 16:40 16:40 


 


WBC   9.2   (3.8-10.6)  k/uL


 


RBC   4.32   (3.80-5.40)  m/uL


 


Hgb   12.2   (11.4-16.0)  gm/dL


 


Hct   37.5   (34.0-46.0)  %


 


MCV   86.8   (80.0-100.0)  fL


 


MCH   28.2   (25.0-35.0)  pg


 


MCHC   32.4   (31.0-37.0)  g/dL


 


RDW   15.0   (11.5-15.5)  %


 


Plt Count   266   (150-450)  k/uL


 


MPV   8.8   


 


Neutrophils %   48   %


 


Lymphocytes %   39   %


 


Monocytes %   7   %


 


Eosinophils %   3   %


 


Basophils %   1   %


 


Neutrophils #   4.4   (1.3-7.7)  k/uL


 


Lymphocytes #   3.6   (1.0-4.8)  k/uL


 


Monocytes #   0.6   (0-1.0)  k/uL


 


Eosinophils #   0.3   (0-0.7)  k/uL


 


Basophils #   0.1   (0-0.2)  k/uL


 


Hypochromasia   Slight   


 


PT    10.1  (10.0-12.5)  sec


 


INR    0.9  (<1.2)  


 


APTT    22.9  (22.0-30.0)  sec


 


Sodium     (137-145)  mmol/L


 


Potassium     (3.5-5.1)  mmol/L


 


Chloride     ()  mmol/L


 


Carbon Dioxide     (22-30)  mmol/L


 


Anion Gap     mmol/L


 


BUN     (7-17)  mg/dL


 


Creatinine     (0.52-1.04)  mg/dL


 


Est GFR (CKD-EPI)AfAm     (>60 ml/min/1.73 sqM)  


 


Est GFR (CKD-EPI)NonAf     (>60 ml/min/1.73 sqM)  


 


Glucose     (74-99)  mg/dL


 


Plasma Lactic Acid Dilan     (0.7-2.0)  mmol/L


 


Calcium     (8.4-10.2)  mg/dL


 


Phosphorus     (2.5-4.5)  mg/dL


 


Magnesium     (1.6-2.3)  mg/dL


 


Total Bilirubin     (0.2-1.3)  mg/dL


 


AST     (14-36)  U/L


 


ALT     (4-34)  U/L


 


Alkaline Phosphatase     ()  U/L


 


Troponin I     (0.000-0.034)  ng/mL


 


Total Protein     (6.3-8.2)  g/dL


 


Albumin     (3.5-5.0)  g/dL


 


TSH  1.020    (0.350-5.500)  UIU/ML


 


Urine Color     


 


Urine Appearance     (Clear)  


 


Urine pH     (5.0-8.0)  


 


Ur Specific Gravity     (1.001-1.035)  


 


Urine Protein     (Negative)  


 


Urine Glucose (UA)     (Negative)  


 


Urine Ketones     (Negative)  


 


Urine Blood     (Negative)  


 


Urine Nitrite     (Negative)  


 


Urine Bilirubin     (Negative)  


 


Urine Urobilinogen     (<2.0)  mg/dL


 


Ur Leukocyte Esterase     (Negative)  


 


Urine RBC     (0-5)  /hpf


 


Urine WBC     (0-5)  /hpf


 


Urine WBC Clumps     (None)  /hpf


 


Ur Squamous Epith Cells     (0-4)  /hpf


 


Urine Bacteria     (None)  /hpf


 


Urine Mucus     (None)  /hpf


 


Urine Yeast (Budding)     (None)  /hpf


 


Influenza Type A (PCR)     (Not Detectd)  


 


Influenza Type B (PCR)     (Not Detectd)  


 


RSV (PCR)     (Not Detectd)  


 


SARS-CoV-2 (PCR)     (Not Detectd)  














  24 Range/Units





  16:40 16:40 16:40 


 


WBC     (3.8-10.6)  k/uL


 


RBC     (3.80-5.40)  m/uL


 


Hgb     (11.4-16.0)  gm/dL


 


Hct     (34.0-46.0)  %


 


MCV     (80.0-100.0)  fL


 


MCH     (25.0-35.0)  pg


 


MCHC     (31.0-37.0)  g/dL


 


RDW     (11.5-15.5)  %


 


Plt Count     (150-450)  k/uL


 


MPV     


 


Neutrophils %     %


 


Lymphocytes %     %


 


Monocytes %     %


 


Eosinophils %     %


 


Basophils %     %


 


Neutrophils #     (1.3-7.7)  k/uL


 


Lymphocytes #     (1.0-4.8)  k/uL


 


Monocytes #     (0-1.0)  k/uL


 


Eosinophils #     (0-0.7)  k/uL


 


Basophils #     (0-0.2)  k/uL


 


Hypochromasia     


 


PT     (10.0-12.5)  sec


 


INR     (<1.2)  


 


APTT     (22.0-30.0)  sec


 


Sodium   139   (137-145)  mmol/L


 


Potassium   3.5   (3.5-5.1)  mmol/L


 


Chloride   103   ()  mmol/L


 


Carbon Dioxide   29   (22-30)  mmol/L


 


Anion Gap   7   mmol/L


 


BUN   31 H   (7-17)  mg/dL


 


Creatinine   1.58 H   (0.52-1.04)  mg/dL


 


Est GFR (CKD-EPI)AfAm   41   (>60 ml/min/1.73 sqM)  


 


Est GFR (CKD-EPI)NonAf   36   (>60 ml/min/1.73 sqM)  


 


Glucose   75   (74-99)  mg/dL


 


Plasma Lactic Acid Dilan    0.9  (0.7-2.0)  mmol/L


 


Calcium   9.1   (8.4-10.2)  mg/dL


 


Phosphorus   4.6 H   (2.5-4.5)  mg/dL


 


Magnesium   2.4 H   (1.6-2.3)  mg/dL


 


Total Bilirubin   0.4   (0.2-1.3)  mg/dL


 


AST   47 H   (14-36)  U/L


 


ALT   29   (4-34)  U/L


 


Alkaline Phosphatase   98   ()  U/L


 


Troponin I     (0.000-0.034)  ng/mL


 


Total Protein   6.8   (6.3-8.2)  g/dL


 


Albumin   4.1   (3.5-5.0)  g/dL


 


TSH     (0.350-5.500)  UIU/ML


 


Urine Color  Colorless    


 


Urine Appearance  Turbid H    (Clear)  


 


Urine pH  5.0    (5.0-8.0)  


 


Ur Specific Gravity  1.016    (1.001-1.035)  


 


Urine Protein  Trace H    (Negative)  


 


Urine Glucose (UA)  4+ H    (Negative)  


 


Urine Ketones  Negative    (Negative)  


 


Urine Blood  Small H    (Negative)  


 


Urine Nitrite  Negative    (Negative)  


 


Urine Bilirubin  Negative    (Negative)  


 


Urine Urobilinogen  <2.0    (<2.0)  mg/dL


 


Ur Leukocyte Esterase  Large H    (Negative)  


 


Urine RBC  70 H    (0-5)  /hpf


 


Urine WBC  >182 H    (0-5)  /hpf


 


Urine WBC Clumps  Many H    (None)  /hpf


 


Ur Squamous Epith Cells  1    (0-4)  /hpf


 


Urine Bacteria  Occasional H    (None)  /hpf


 


Urine Mucus  Rare H    (None)  /hpf


 


Urine Yeast (Budding)  Many H    (None)  /hpf


 


Influenza Type A (PCR)     (Not Detectd)  


 


Influenza Type B (PCR)     (Not Detectd)  


 


RSV (PCR)     (Not Detectd)  


 


SARS-CoV-2 (PCR)     (Not Detectd)  














  24 Range/Units





  16:40 16:40 


 


WBC    (3.8-10.6)  k/uL


 


RBC    (3.80-5.40)  m/uL


 


Hgb    (11.4-16.0)  gm/dL


 


Hct    (34.0-46.0)  %


 


MCV    (80.0-100.0)  fL


 


MCH    (25.0-35.0)  pg


 


MCHC    (31.0-37.0)  g/dL


 


RDW    (11.5-15.5)  %


 


Plt Count    (150-450)  k/uL


 


MPV    


 


Neutrophils %    %


 


Lymphocytes %    %


 


Monocytes %    %


 


Eosinophils %    %


 


Basophils %    %


 


Neutrophils #    (1.3-7.7)  k/uL


 


Lymphocytes #    (1.0-4.8)  k/uL


 


Monocytes #    (0-1.0)  k/uL


 


Eosinophils #    (0-0.7)  k/uL


 


Basophils #    (0-0.2)  k/uL


 


Hypochromasia    


 


PT    (10.0-12.5)  sec


 


INR    (<1.2)  


 


APTT    (22.0-30.0)  sec


 


Sodium    (137-145)  mmol/L


 


Potassium    (3.5-5.1)  mmol/L


 


Chloride    ()  mmol/L


 


Carbon Dioxide    (22-30)  mmol/L


 


Anion Gap    mmol/L


 


BUN    (7-17)  mg/dL


 


Creatinine    (0.52-1.04)  mg/dL


 


Est GFR (CKD-EPI)AfAm    (>60 ml/min/1.73 sqM)  


 


Est GFR (CKD-EPI)NonAf    (>60 ml/min/1.73 sqM)  


 


Glucose    (74-99)  mg/dL


 


Plasma Lactic Acid Dilan    (0.7-2.0)  mmol/L


 


Calcium    (8.4-10.2)  mg/dL


 


Phosphorus    (2.5-4.5)  mg/dL


 


Magnesium    (1.6-2.3)  mg/dL


 


Total Bilirubin    (0.2-1.3)  mg/dL


 


AST    (14-36)  U/L


 


ALT    (4-34)  U/L


 


Alkaline Phosphatase    ()  U/L


 


Troponin I  <0.012   (0.000-0.034)  ng/mL


 


Total Protein    (6.3-8.2)  g/dL


 


Albumin    (3.5-5.0)  g/dL


 


TSH    (0.350-5.500)  UIU/ML


 


Urine Color    


 


Urine Appearance    (Clear)  


 


Urine pH    (5.0-8.0)  


 


Ur Specific Gravity    (1.001-1.035)  


 


Urine Protein    (Negative)  


 


Urine Glucose (UA)    (Negative)  


 


Urine Ketones    (Negative)  


 


Urine Blood    (Negative)  


 


Urine Nitrite    (Negative)  


 


Urine Bilirubin    (Negative)  


 


Urine Urobilinogen    (<2.0)  mg/dL


 


Ur Leukocyte Esterase    (Negative)  


 


Urine RBC    (0-5)  /hpf


 


Urine WBC    (0-5)  /hpf


 


Urine WBC Clumps    (None)  /hpf


 


Ur Squamous Epith Cells    (0-4)  /hpf


 


Urine Bacteria    (None)  /hpf


 


Urine Mucus    (None)  /hpf


 


Urine Yeast (Budding)    (None)  /hpf


 


Influenza Type A (PCR)   Not Detected  (Not Detectd)  


 


Influenza Type B (PCR)   Not Detected  (Not Detectd)  


 


RSV (PCR)   Not Detected  (Not Detectd)  


 


SARS-CoV-2 (PCR)   Not Detected  (Not Detectd)  














Disposition


Clinical Impression: 


 Falls, Tremor, Dysarthria





Disposition: ADMITTED AS IP TO THIS [facility]


Condition: Stable


Is patient prescribed a controlled substance at d/c from ED?: No


Time of Disposition: 20:02


Decision to Admit Reason: Admit from EC


Decision Date: 24


Decision Time: 20:02

## 2024-07-31 NOTE — XR
EXAMINATION TYPE: XR chest 2V

 

DATE OF EXAM: 7/31/2024 4:46 PM

 

CLINICAL INDICATION:Female, 59 years old with history of Weakness; [facility]

 

COMPARISON: Chest radiographs from for 30 2024.

 

TECHNIQUE: XR chest 2V Frontal view of the chest.

 

FINDINGS: 

Lungs/Pleura: There is no evidence of pleural effusion, focal consolidation, or pneumothorax.  

Pulmonary vascularity: Unremarkable.

Heart/mediastinum: Cardiomediastinal silhouette is unremarkable.

Musculoskeletal: No acute osseous pathology. Midline sternotomy wires are noted.

 

IMPRESSION: 

No acute cardiopulmonary disease/process.

## 2024-08-01 VITALS — RESPIRATION RATE: 16 BRPM

## 2024-08-01 LAB
ANION GAP SERPL CALC-SCNC: 3 MMOL/L
BASOPHILS # BLD AUTO: 0 K/UL (ref 0–0.2)
BASOPHILS NFR BLD AUTO: 0 %
BUN SERPL-SCNC: 29 MG/DL (ref 7–17)
CALCIUM SPEC-MCNC: 8.6 MG/DL (ref 8.4–10.2)
CHLORIDE SERPL-SCNC: 108 MMOL/L (ref 98–107)
CO2 SERPL-SCNC: 24 MMOL/L (ref 22–30)
EOSINOPHIL # BLD AUTO: 0.3 K/UL (ref 0–0.7)
EOSINOPHIL NFR BLD AUTO: 4 %
ERYTHROCYTE [DISTWIDTH] IN BLOOD BY AUTOMATED COUNT: 4.04 M/UL (ref 3.8–5.4)
ERYTHROCYTE [DISTWIDTH] IN BLOOD: 15.3 % (ref 11.5–15.5)
GLUCOSE BLD-MCNC: 120 MG/DL (ref 70–110)
GLUCOSE BLD-MCNC: 215 MG/DL (ref 70–110)
GLUCOSE BLD-MCNC: 67 MG/DL (ref 70–110)
GLUCOSE BLD-MCNC: 74 MG/DL (ref 70–110)
GLUCOSE BLD-MCNC: 82 MG/DL (ref 70–110)
GLUCOSE BLD-MCNC: 96 MG/DL (ref 70–110)
GLUCOSE SERPL-MCNC: 236 MG/DL (ref 74–99)
HCT VFR BLD AUTO: 35.3 % (ref 34–46)
HGB BLD-MCNC: 11.3 GM/DL (ref 11.4–16)
LYMPHOCYTES # SPEC AUTO: 3.2 K/UL (ref 1–4.8)
LYMPHOCYTES NFR SPEC AUTO: 40 %
MCH RBC QN AUTO: 28.1 PG (ref 25–35)
MCHC RBC AUTO-ENTMCNC: 32.1 G/DL (ref 31–37)
MCV RBC AUTO: 87.3 FL (ref 80–100)
MONOCYTES # BLD AUTO: 0.5 K/UL (ref 0–1)
MONOCYTES NFR BLD AUTO: 6 %
NEUTROPHILS # BLD AUTO: 3.9 K/UL (ref 1.3–7.7)
NEUTROPHILS NFR BLD AUTO: 48 %
PLATELET # BLD AUTO: 210 K/UL (ref 150–450)
POTASSIUM SERPL-SCNC: 4.4 MMOL/L (ref 3.5–5.1)
SODIUM SERPL-SCNC: 135 MMOL/L (ref 137–145)
WBC # BLD AUTO: 8.2 K/UL (ref 3.8–10.6)

## 2024-08-01 RX ADMIN — MONTELUKAST SODIUM SCH MG: 10 TABLET, FILM COATED ORAL at 10:16

## 2024-08-01 RX ADMIN — PANTOPRAZOLE SODIUM SCH MG: 40 TABLET, DELAYED RELEASE ORAL at 10:17

## 2024-08-01 RX ADMIN — MIDODRINE HYDROCHLORIDE SCH MG: 5 TABLET ORAL at 10:19

## 2024-08-01 RX ADMIN — INSULIN ASPART SCH UNIT: 100 INJECTION, SUSPENSION SUBCUTANEOUS at 17:34

## 2024-08-01 RX ADMIN — ESCITALOPRAM OXALATE SCH MG: 20 TABLET, FILM COATED ORAL at 10:19

## 2024-08-01 RX ADMIN — INSULIN ASPART SCH UNIT: 100 INJECTION, SUSPENSION SUBCUTANEOUS at 08:57

## 2024-08-01 RX ADMIN — LORATADINE SCH MG: 10 TABLET ORAL at 10:16

## 2024-08-01 RX ADMIN — BUPROPION HYDROCHLORIDE SCH MG: 150 TABLET, FILM COATED, EXTENDED RELEASE ORAL at 10:19

## 2024-08-01 RX ADMIN — INSULIN ASPART SCH UNIT: 100 INJECTION, SOLUTION INTRAVENOUS; SUBCUTANEOUS at 08:56

## 2024-08-01 RX ADMIN — DAPAGLIFLOZIN SCH MG: 5 TABLET, FILM COATED ORAL at 10:16

## 2024-08-01 NOTE — P.CNNES
History of Present Illness


Consult date: 24


Requesting physician: Min Villalobos


Reason for Consult: Tremor and AMS


History of Present Illness: 


This is a 59-year-old woman neurology is consulted for tremor.  She states that 

she has been having the tremors of the hands for 1 year and more and she feels 

it is at rest as well she notices also when she is moving and trying to grab 

something as well she feels like she is having internal tremors.  Patient seems 

a little bit tangential upon the obtaining the history.  She denies any loss of 

consciousness with these tremors.  Denies any urinary or bowel incontinence.  

Denies any tongue bite.  Denies any history of seizures or strokes in the past. 

She states she is undergoing a divorce process in the last 6-month.  Patient 

does have history of diabetes as well as neuropathy.  She states that she is on 

blood pressure medication that lowers as well as elevates her blood pressure.  

Denies any focal weakness, any new numbness, any visual disturbance any 

swallowing difficulty. 





Some of the workup during this hospital visit consisted of:


Patient had episode of hypotensive as low as 80s over 50s once


Reviewed the rest of the lab workup


Urinalysis seems suggestive of likely urinary tract infection


CT of the head is reported as no acute intracranial process.  Personally 

reviewed the CT and I agree with the report.








Review of Systems


The positive and negative as per HPI.








Past Medical History


Past Medical History: Diabetes Mellitus, Eye Disorder, Hyperlipidemia, 

Hypertension


Additional Past Medical History / Comment(s): 2/25/15  Pt presented to [facility] ER 

with substernal chest pain that started 24 hhours before coming to ER.  She 

noticed the chest pain when she woke up yesterday.  It is a heaviness and is 

intermittent with variable duration.  She also states she had alittle nausea 

with it.   Other HX:  Pt had recent (1/21/15)cataract surgery with lens implants

bilaterally at [place].  Hospital-post op she had low O2 saturations and was 

told she had a very narrow airway-instead of going home same day, she was in the

hospital for a couple days until her saturations improved.  She was discharged 

with home O2 which she wears at 2L/NC at nite and was to follow up today for 

testing for sleep apnea.  Pt states she also has diarrhea fairly frequently. 

Also has hx of 3 ruptured cervical discs with surgery and bilateral retina 

repair.  2018 - intermittently in hospital for 3 months for fluid on lungs, had 

fluid removed


History of Any Multi-Drug Resistant Organisms: MRSA


Date of last positivie culture/infection: 


MDRO Source:: legs and breasts.


Past Surgical History: Bariatric Surgery, Cholecystectomy, Hysterectomy, 

Orthopedic Surgery


Additional Past Surgical History / Comment(s): Bilateral cataract sx with lens 

implants, bilateral retinal repair. Cervical rodding for 3 ruptured discs.  

sleeve gastrectomy         3-1-21


Past Anesthesia/Blood Transfusion Reactions: Postoperative Nausea & Vomiting 

(PONV)


Additional Past Anesthesia/Blood Transfusion Reaction / Comment(s): Pt had 

cataract and lens implant at [facility] on 1/21/15 and afterwards she 

desaturated and was hospitalized.  She was told she has a very narrow airway.  

Pt has never recieved blood.


Past Psychological History: No Psychological Hx Reported


Smoking Status: Former smoker


Past Alcohol Use History: None Reported


Past Drug Use History: None Reported





- Past Family History


  ** Father


Family Medical History: Cancer, Myocardial Infarction (MI)


Additional Family Medical History / Comment(s): Father had 5 MI's and  of 

bone cancer.





  ** Mother


Family Medical History: Cancer


Additional Family Medical History / Comment(s): cervical cancer





Medications and Allergies


                                Home Medications











 Medication  Instructions  Recorded  Confirmed  Type


 


Montelukast [Singulair] 10 mg PO DAILY 18 History


 


Mirabegron [Myrbetriq] 50 mg PO DAILY 20 History


 


Aspirin EC [Ecotrin Low Dose] 81 mg PO HS 21 History


 


Empagliflozin [Jardiance] 10 mg PO DAILY 22 History


 


Ergocalciferol [Vitamin D2 (1250 1,250 mcg PO Q14D 22 History





Mcg = 44126 Iu)]    


 


traMADol HCL 50 mg PO Q6H PRN 22 History


 


Escitalopram [Lexapro] 20 mg PO DAILY 24 History


 


Insuln Asp Prt/Insulin Aspart 20 unit SQ AC-SUPPER 24 History





[NovoLOG MIX 70-30 VIAL]    


 


Insuln Asp Prt/Insulin Aspart 40 unit SQ AC-BRKFST 24 History





[NovoLOG MIX 70-30 VIAL]    


 


Omeprazole 40 mg PO DAILY 24 History


 


Rosuvastatin [Crestor] 20 mg PO HS 24 History


 


Semaglutide [Ozempic] 1 mg SQ TU 24 History


 


Loratadine [Claritin] 10 mg PO DAILY #30 tab 24 Rx


 


Pregabalin [Lyrica] 150 mg PO BID #0 24 Rx


 


Baclofen [Lioresal] 5 mg PO BID PRN 24 History


 


HYDROcodone/APAP 5-325MG [Norco 1 tab PO QID PRN 24 History





5-325]    


 


Midodrine HCl [ProAmatine] 10 mg PO DAILY 24 History


 


buPROPion XL [Wellbutrin XL] 150 mg PO DAILY 24 History


 


lisinopriL [Zestril] 20 mg PO DAILY 24 History








                                    Allergies











Allergy/AdvReac Type Severity Reaction Status Date / Time


 


Sulfa (Sulfonamide Allergy  Rash, Verified 24 18:55





Antibiotics)   hives,  





   swelling  














Physical Examination





- Vital Signs


Vital Signs: 


                                   Vital Signs











  Temp Pulse Pulse Resp BP BP BP


 


 24 13:25  98.2 F   74  16    104/65


 


 24 10:29        100/60


 


 24 10:18        84/52


 


 24 07:40  98.1 F   79    


 


 24 07:14  97.9 F   78  18   


 


 24 01:43  98.3 F   76  16   120/76 


 


 24 21:38  97.9 F   77  16   118/72 


 


 24 20:49  97.9 F  73   18  113/64  


 


 24 19:57   75   18  104/42  


 


 24 17:07   73   16  110/50  


 


 24 16:00  97.8 F  68   18  93/43  














  BP Pulse Ox


 


 24 13:25   100


 


 24 10:29  


 


 24 10:18  


 


 24 07:40  106/70  95


 


 24 07:14  134/72  99


 


 24 01:43   97


 


 24 21:38   98


 


 24 20:49   98


 


 24 19:57   100


 


 24 17:07   99


 


 24 16:00   96








                                Intake and Output











 24





 22:59 06:59 14:59


 


Intake Total  590 


 


Balance  590 


 


Intake:   


 


  Oral  590 


 


Other:   


 


  # Voids  2 1


 


  # Bowel Movements  1 


 


  Weight 108.862 kg  











GENERAL: The patient is lying in bed and is not in acute distress.





NEUROLOGICAL:


Higher mental function: The patient is awake, alert, oriented to self, place and

 time.  Patient is following commands.  No aphasia and no neglect.


Cranial nerves: The pupils are round, equal and reactive to light and 

accommodation.  Visual fields are full to confrontation throughout.  Extraocular

 movement is intact no nystagmus is noted.  Facial sensation is normal to touch 

throughout.  The facial strength is normal throughout.  Hearing is normal 

bilaterally to hand rub.  Tongue is midline and moved side-to-side without any 

difficulty.  No dysarthria is noted.  Shoulder shrug is normal bilaterally.


Motor: Gait is normal.  The strength is 5 over 5 throughout.  Normal tone and 

bulk.  Her tremor of bilateral upper extremity was exacerbated when I asked her 

about tremor but improved when I change topic.  She has resting tremor and with 

action but again but limited since when patient was asked about tremor it got 

worse but when distracted it resolved.  


Cerebellum: Normal finger to nose heel to chin bilaterally.


Sensation: Sensation is normal to touch throughout.


Reflexes (right/left): 2+ in uppers while lowers are 1+.


Plantars are mute bilaterally.








Results





- Laboratory Findings


CBC and BMP: 


                                 24 05:40





                                 24 05:40


Abnormal Lab Findings: 


                                  Abnormal Labs











  24





  16:40 16:40 21:29


 


Hgb   


 


Sodium   


 


Chloride   


 


BUN   31 H 


 


Creatinine   1.58 H 


 


Glucose   


 


POC Glucose (mg/dL)    138 H


 


Phosphorus   4.6 H 


 


Magnesium   2.4 H 


 


AST   47 H 


 


Urine Appearance  Turbid H  


 


Urine Protein  Trace H  


 


Urine Glucose (UA)  4+ H  


 


Urine Blood  Small H  


 


Ur Leukocyte Esterase  Large H  


 


Urine RBC  70 H  


 


Urine WBC  >182 H  


 


Urine WBC Clumps  Many H  


 


Urine Bacteria  Occasional H  


 


Urine Mucus  Rare H  


 


Urine Yeast (Budding)  Many H  














  24





  05:40 05:40 07:12


 


Hgb  11.3 L  


 


Sodium   135 L 


 


Chloride   108 H 


 


BUN   29 H 


 


Creatinine   1.29 H 


 


Glucose   236 H 


 


POC Glucose (mg/dL)    215 H


 


Phosphorus   


 


Magnesium   


 


AST   


 


Urine Appearance   


 


Urine Protein   


 


Urine Glucose (UA)   


 


Urine Blood   


 


Ur Leukocyte Esterase   


 


Urine RBC   


 


Urine WBC   


 


Urine WBC Clumps   


 


Urine Bacteria   


 


Urine Mucus   


 


Urine Yeast (Budding)   














Assessment and Plan


Assessment: 


This is a 59-year-old woman with history of diabetes, neuropathy, hypertension 

who has episodes of hypertension as well as hypotension who states is having 

tremor bilateral upper extremity with rest and with action for the last at least

 1 year or more.  She states she is in the middle of a divorce process in the 

last 6 months.  On examination her tremor was limited since 1 addressing the 

tremor the symptoms got worse but when distracted tremor resolved.





Tremor and unsure if it is exacerbated because of her acute stress situation and

 medical condition.  She denies losing consciousness, urinary or bowel 

incontinence and this does not seem like a seizure.  Unsure if she has some 

component of essential tremor.  The head is unremarkable.


Likely acute urinary tract infection


Hypotensive episode


Diabetes


Diabetic neuropathy


History of hypertension





Plan: 


I ordered TSH


I highly recommend the patient to follow-up with a neurologist as an outpatient 

for her tremor since she has been having symptoms for a year or more and I feel 

it is exacerbated with stress


Please avoid hypotensive episode.


Otherwise will defer the rest of the medical management to primary and other 

special





Plan discussed with the patient and her nurse.


If TSH is normal then there is no further neurological workup.





Time with Patient: Greater than 30

## 2024-08-01 NOTE — P.HPIM
History of Present Illness


H&P Date: 24





HISTORY OF PRESENT ILLNESS:


59-year-old morbidly obese with active medical history of type 2 diabetes, 

hypertension, hyperlipidemia, chronic neuropathy, chronic GERD, depression, 

incontinence, vitamin D deficiency, chronic pain syndrome.


She was hospitalized in April for 2 days with worsening symptoms of headache 

lightheadedness presyncope like symptoms associated with significant abnormal 

balance and gait when leaning toward the left side become extremely dizzy and 

lightheaded was diagnosed with complication related to medication side effect 

and diabetic autonomic neuropathy.  With hydration and change in medication she 

felt good and was discharged home the following day she was diagnosed with acute

sinusitis at the time was fully treated.


She presented to the Emergency Department accompanied with her family was 

concerned about her safety multiple fall 1 last week and 1 yesterday as she walk

her legs gave out on her and she has no control of them she has been having 

worsening of tremor in her mouth and upper extremity much worsening lately than 

before no associated headache no recent change in medication family have been 

concerning about Parkinson disease and parkinsonism given from her last 

discharge she was supposed to see neurology and follow-up with neurology at the 

time.


Her examination lab value shows slight acute kidney injury with creatinine 1.58 

bun 31 slightly electrolyte imbalance with abnormal phosphorus normal but high 

magnesium slightly abnormal liver function test UA was severely positive this 

time with multiple clumps of WBC and large leukocyte and RBC.  Her respiratory 

panel was negative CBC did not show any abnormality with normal coagulation.  

Chest x-ray showed no acute pulmonary process CAT scan of the brain without 

contrast showed no acute intracranial process.











REVIEW OF SYSTEMS:


CONSTITUTIONAL: Obese no acute respiratory distress


EYES: No icterus sclerae, no conjunctivitis.


EARS, NOSE, MOUTH, THROAT, and FACE: No sore throat, lymphadenopathy, carotid 

bruits or deformity.  


RESPIRATORY: No shortness of breath cough wheezes.


CARDIOVASCULAR: No CP, Palpitation, PND, Orthopnea, or angina.


GASTROINTESTINAL: No Abd pain, Nausea or vomiting, no Diarrhea or constipation, 

No GI Bleed, no distention or masses.


GENITOURINARY: History of incontinence and positive UA for UTI with mild 

symptoms.


INTEGUMENT/BREAST: Negative for any muscular injury with mild osteoarthritis..


HEMATOLOGIC/LYMPHATIC: Negative for bleed or purpura.  Chronic neuropathy.


MUSCULOSKELTAL: Mild myalgia or arthralgia.


NEURLOGICAL: Self headache, dizziness, slight resting tremor with rigid muscle 

at the time coordination is a bit off.


BEHAVIORAL/PSYCH: Negative.


ENDOCRINE: Negative.








PHYSICAL EXAMINATION:


General Appearance: Alert, cooperative, no distress, appears stated age.


Neck HEENT: Supple, no lymphadenopathy, no thyroid enlargement, no carotid 

bruits.  


Lungs: Clear to auscultation without crackles or wheezes no rhonchi, no 

deformity.


Chest Wall: Chest wall normal expansion with deep inspiration no tenderness and 

no deformity was found on exam, no costochondral pain or discomfort.


Heart: Regular rate and rhythm, S1, S2 normal, no murmur, rub or gallop.


Back: Symmetric, no curvature, ROM normal, no CVA tenderness.


Abdomen: Soft, non-tender, bowel sounds active all four quadrants, no masses, no

organomegaly.  Slight discomfort lower abdominal region area.


Extremities: Extremities normal, atraumatic, no cyanosis or edema.


Pulses: 2+ and symmetric.


Skin: Skin color, texture, tugor normal, no rashes or lesions.


Neurologic: Alert oriented x3 cranial nerves II through XII intact, no motor 

deficit, sensation is normal equal bilaterally, mild tremor.





ASSESSMENT AND PLAN:





_Altered mental status: Not clear etiology this could be metabolic, could be 

central nervous system could be worsening parkinsonism or essential tremor.  

Also carry on multi medication and polypharmacy could be part of the problem at 

this point.





_Metabolic encephalopathy: Most likely caused by the worsening symptoms along 

with her UTI UA was positive today Was initiated on 1 g of Rocephin.  Will 

follow her lab and watch it carefully.





_Urinary tract infection with no sign of sepsis, will continue patient on 

Rocephin and upon discharge will be on cefuroxime.





_Severe and worsening of tremor was supposed to see and finalize her management 

with neurology not clear whether which she has is more parkinsonism or essential

tremor with worsening symptoms with medication.  Consult neurology and when 

discharged she need again to follow-up with neurology as an outpatient.





_Type 2 diabetes: Resume her Medication with NovoLog Mix 70/30 40 mg in the 

morning 20 units in the evening continue Farxiga continue Accu-Chek with sliding

scales coverage and apparently was on Ozempic.








_Abnormal balance and gait: Could be the effect of her tremor.  Due to metabolic

encephalopathy patient was going to try to exclude side effect and complication 

related to medication.





_Acute kidney injury: Probably caused by also going for infection and probably 

dehydration gentle hydration repeat CMP in 24 hours.





_Hypertension: Blood pressure has been quite bit low despite being on Zestril 

and midodrine decrease Zestril to 10 mg and furthermore down to 5 if needed as 

per her midodrine will be continue only if systolic blood pressure running below

100.





_Severe neuropathy: Most likely diabetic continue Lyrica 150 mg twice a day from

last admission was claimed to be side effect of Lyrica at the time and dose was 

decreased from 3 times a day to twice a day patient was supposed to stay under 

100 mg twice a day only which probably will help her mental status and her 

balance and gait.





_Hyperlipidemia: Remain on rosuvastatin 20 mg a day continue vacation.





_Overflow incontinence: Was on Myrbetriq which will be held for now to be re

evaluated specially to reduce any side effect.





_Chronic depression and flat affect: Has been on Lexapro along with Wellbutrin 

 mg a day.





_Chronic pain syndrome: Has been on tramadol and Lyrica will hold tramadol for 

now to reduce any side effect.





_Chronic degenerative disc disease: Again still on baclofen and pain management.





_Severe GERD/GI prophylaxis: Continue pantoprazole.





_DVT prophylaxis: Knee-high RACHELLE hose and early mobilization.





_Debility with multiple falls: Will add physical therapy and Occupational 

Therapy patient might benefit from going to short-term rehab.





CODE STATUS: Full code.





Admit patient to the inpatient service for more than 2 night stay














Past Medical History


Past Medical History: Diabetes Mellitus, Eye Disorder, Hyperlipidemia, 

Hypertension


Additional Past Medical History / Comment(s): 2/25/15  Pt presented to [facility] ER 

with substernal chest pain that started 24 hhours before coming to ER.  She 

noticed the chest pain when she woke up yesterday.  It is a heaviness and is 

intermittent with variable duration.  She also states she had alittle nausea 

with it.   Other HX:  Pt had recent (1/21/15)cataract surgery with lens implants

bilaterally at [place].  Hospital-post op she had low O2 saturations and was 

told she had a very narrow airway-instead of going home same day, she was in the

hospital for a couple days until her saturations improved.  She was discharged 

with home O2 which she wears at 2L/NC at [facility] and was to follow up today for 

testing for sleep apnea.  Pt states she also has diarrhea fairly frequently. 

Also has hx of 3 ruptured cervical discs with surgery and bilateral retina 

repair.  2018 - intermittently in hospital for 3 months for fluid on lungs, had 

fluid removed


History of Any Multi-Drug Resistant Organisms: MRSA


Date of last positivie culture/infection: 


MDRO Source:: legs and breasts.


Past Surgical History: Bariatric Surgery, Cholecystectomy, Hysterectomy, 

Orthopedic Surgery


Additional Past Surgical History / Comment(s): Bilateral cataract sx with lens 

implants, bilateral retinal repair. Cervical rodding for 3 ruptured discs.  

sleeve gastrectomy         3-1-21


Past Anesthesia/Blood Transfusion Reactions: Postoperative Nausea & Vomiting 

(PONV)


Additional Past Anesthesia/Blood Transfusion Reaction / Comment(s): Pt had 

cataract and lens implant at [facility] on 1/21/15 and afterwards she 

desaturated and was hospitalized.  She was told she has a very narrow airway.  

Pt has never recieved blood.


Past Psychological History: No Psychological Hx Reported


Smoking Status: Former smoker


Past Alcohol Use History: None Reported


Past Drug Use History: None Reported





- Past Family History


  ** Father


Family Medical History: Cancer, Myocardial Infarction (MI)


Additional Family Medical History / Comment(s): Father had 5 MI's and  of 

bone cancer.





  ** Mother


Family Medical History: Cancer


Additional Family Medical History / Comment(s): cervical cancer





Medications and Allergies


                                Home Medications











 Medication  Instructions  Recorded  Confirmed  Type


 


Montelukast [Singulair] 10 mg PO DAILY 18 History


 


Mirabegron [Myrbetriq] 50 mg PO DAILY 20 History


 


Aspirin EC [Ecotrin Low Dose] 81 mg PO HS 21 History


 


Empagliflozin [Jardiance] 10 mg PO DAILY 22 History


 


Ergocalciferol [Vitamin D2 (1250 1,250 mcg PO Q14D 22 History





Mcg = 61237 Iu)]    


 


traMADol HCL 50 mg PO Q6H PRN 22 History


 


Escitalopram [Lexapro] 20 mg PO DAILY 24 History


 


Insuln Asp Prt/Insulin Aspart 20 unit SQ AC-SUPPER 24 History





[NovoLOG MIX 70-30 VIAL]    


 


Insuln Asp Prt/Insulin Aspart 40 unit SQ AC-BRKFST 24 History





[NovoLOG MIX 70-30 VIAL]    


 


Omeprazole 40 mg PO DAILY 24 History


 


Rosuvastatin [Crestor] 20 mg PO HS 24 History


 


Semaglutide [Ozempic] 1 mg SQ TU 24 History


 


Loratadine [Claritin] 10 mg PO DAILY #30 tab 24 Rx


 


Pregabalin [Lyrica] 150 mg PO BID #0 24 Rx


 


Baclofen [Lioresal] 5 mg PO BID PRN 24 History


 


HYDROcodone/APAP 5-325MG [Norco 1 tab PO QID PRN 24 History





5-325]    


 


Midodrine HCl [ProAmatine] 10 mg PO DAILY 24 History


 


buPROPion XL [Wellbutrin XL] 150 mg PO DAILY 24 History


 


lisinopriL [Zestril] 20 mg PO DAILY 24 History








                                    Allergies











Allergy/AdvReac Type Severity Reaction Status Date / Time


 


Sulfa (Sulfonamide Allergy  Rash, Verified 24 18:55





Antibiotics)   hives,  





   swelling  














Physical Exam


Vitals: 


                                   Vital Signs











  Temp Pulse Pulse Resp BP BP Pulse Ox


 


 24 21:38  97.9 F   77  16   118/72  98


 


 24 20:49  97.9 F  73   18  113/64   98


 


 24 19:57   75   18  104/42   100


 


 24 17:07   73   16  110/50   99


 


 24 16:00  97.8 F  68   18  93/43   96








                                Intake and Output











 24





 06:59 14:59 22:59


 


Other:   


 


  Weight   108.862 kg














Results


CBC & Chem 7: 


                                 24 16:40





                                 24 16:40


Labs: 


                  Abnormal Lab Results - Last 24 Hours (Table)











  24 Range/Units





  16:40 16:40 21:29 


 


BUN   31 H   (7-17)  mg/dL


 


Creatinine   1.58 H   (0.52-1.04)  mg/dL


 


POC Glucose (mg/dL)    138 H  ()  mg/dL


 


Phosphorus   4.6 H   (2.5-4.5)  mg/dL


 


Magnesium   2.4 H   (1.6-2.3)  mg/dL


 


AST   47 H   (14-36)  U/L


 


Urine Appearance  Turbid H    (Clear)  


 


Urine Protein  Trace H    (Negative)  


 


Urine Glucose (UA)  4+ H    (Negative)  


 


Urine Blood  Small H    (Negative)  


 


Ur Leukocyte Esterase  Large H    (Negative)  


 


Urine RBC  70 H    (0-5)  /hpf


 


Urine WBC  >182 H    (0-5)  /hpf


 


Urine WBC Clumps  Many H    (None)  /hpf


 


Urine Bacteria  Occasional H    (None)  /hpf


 


Urine Mucus  Rare H    (None)  /hpf


 


Urine Yeast (Budding)  Many H    (None)  /hpf

## 2024-08-02 VITALS — TEMPERATURE: 98.2 F | SYSTOLIC BLOOD PRESSURE: 108 MMHG | DIASTOLIC BLOOD PRESSURE: 66 MMHG | HEART RATE: 73 BPM

## 2024-08-02 LAB
GLUCOSE BLD-MCNC: 191 MG/DL (ref 70–110)
GLUCOSE BLD-MCNC: 98 MG/DL (ref 70–110)

## 2024-08-02 NOTE — P.PN
Subjective


Progress Note Date: 08/01/24





HISTORY OF PRESENT ILLNESS:


59-year-old morbidly obese with active medical history of type 2 diabetes, 

hypertension, hyperlipidemia, chronic neuropathy, chronic GERD, depression, inc

ontinence, vitamin D deficiency, chronic pain syndrome.


She was hospitalized in April for 2 days with worsening symptoms of headache 

lightheadedness presyncope like symptoms associated with significant abnormal 

balance and gait when leaning toward the left side become extremely dizzy and 

lightheaded was diagnosed with complication related to medication side effect 

and diabetic autonomic neuropathy.  With hydration and change in medication she 

felt good and was discharged home the following day she was diagnosed with acute

sinusitis at the time was fully treated.


She presented to the Emergency Department accompanied with her family was 

concerned about her safety multiple fall 1 last week and 1 yesterday as she walk

her legs gave out on her and she has no control of them she has been having 

worsening of tremor in her mouth and upper extremity much worsening lately than 

before no associated headache no recent change in medication family have been 

concerning about Parkinson disease and parkinsonism given from her last 

discharge she was supposed to see neurology and follow-up with neurology at the 

time.


Her examination lab value shows slight acute kidney injury with creatinine 1.58 

bun 31 slightly electrolyte imbalance with abnormal phosphorus normal but high 

magnesium slightly abnormal liver function test UA was severely positive this 

time with multiple clumps of WBC and large leukocyte and RBC.  Her respiratory 

panel was negative CBC did not show any abnormality with normal coagulation.  

Chest x-ray showed no acute pulmonary process CAT scan of the brain without 

contrast showed no acute intracranial process.








August 1, 2024: Patient is doing much better and that seen neurology who address

the tremor and believe the minute she is distracted the tremor is resolved this 

is not a sign of parkinsonism or Parkinson disease for the most can be 

situational tremor aggravated by stress also can be essential tremor.  She has 

urinary tract infection which can be contributing factor to the severe weakness 

and abnormal balance with fall order TSH came back normal and the matter of 

managing her hypo-/hypertension we will make an adjustment of her midodrine and 

significantly reduce her lisinopril down further to 5 mg a day and prepare for 

helping and not to have any hypotension might be causing symptoms of 

lightheadedness or dizziness.  In the meanwhile wait for physical therapy to 

help patient to ambulate walk and decide afterward whether we have a problem 

require further help or not.  The patient will remain in the hospital till 

tomorrow we will continue physical therapy keep working on her hypertension if 

she is stable tomorrow we will switch her medical management of UTI to oral and 

send her home.








REVIEW OF SYSTEMS:


CONSTITUTIONAL: Obese no acute respiratory distress


EYES: No icterus sclerae, no conjunctivitis.


EARS, NOSE, MOUTH, THROAT, and FACE: No sore throat, lymphadenopathy, carotid 

bruits or deformity.  


RESPIRATORY: No shortness of breath cough wheezes.


CARDIOVASCULAR: No CP, Palpitation, PND, Orthopnea, or angina.


GASTROINTESTINAL: No Abd pain, Nausea or vomiting, no Diarrhea or constipation, 

No GI Bleed, no distention or masses.


GENITOURINARY: History of incontinence and positive UA for UTI with mild 

symptoms.


INTEGUMENT/BREAST: Negative for any muscular injury with mild osteoarthritis..


HEMATOLOGIC/LYMPHATIC: Negative for bleed or purpura.  Chronic neuropathy.


MUSCULOSKELTAL: Mild myalgia or arthralgia.


NEURLOGICAL: Self headache, dizziness, slight resting tremor with rigid muscle 

at the time coordination is a bit off.


BEHAVIORAL/PSYCH: Negative.


ENDOCRINE: Negative.








PHYSICAL EXAMINATION:


General Appearance: Alert, cooperative, no distress, appears stated age.


Neck HEENT: Supple, no lymphadenopathy, no thyroid enlargement, no carotid 

bruits.  


Lungs: Clear to auscultation without crackles or wheezes no rhonchi, no 

deformity.


Chest Wall: Chest wall normal expansion with deep inspiration no tenderness and 

no deformity was found on exam, no costochondral pain or discomfort.


Heart: Regular rate and rhythm, S1, S2 normal, no murmur, rub or gallop.


Back: Symmetric, no curvature, ROM normal, no CVA tenderness.


Abdomen: Soft, non-tender, bowel sounds active all four quadrants, no masses, no

organomegaly.  Slight discomfort lower abdominal region area.


Extremities: Extremities normal, atraumatic, no cyanosis or edema.


Pulses: 2+ and symmetric.


Skin: Skin color, texture, tugor normal, no rashes or lesions.


Neurologic: Alert oriented x3 cranial nerves II through XII intact, no motor 

deficit, sensation is normal equal bilaterally, mild tremor.





ASSESSMENT AND PLAN:





_Altered mental status: Much better so far more clear than yesterday still 

looking into tremor as a possibility of parkinsonism versus essential tremor out

circumstances coarse tremor versus medication medication side effect.  She is 

doing better so far continue to follow instruction with neuro.  





_Metabolic encephalopathy: Again this is combination of UTI along with 

medication side effect continue current management for now hoping this is anthony 

clear her metabolic encephalopathy and bring her memory function back to his 

normal.  





_Urinary tract infection with no sign of sepsis, will continue patient on 

Rocephin and upon discharge will be on cefuroxime.





_worsening tremor: Again is not sign of parkinsonism it can be essential tremor 

no acute treatment or management required for now we will continue conservative 

management keep watching her symptoms and seems like patient is stressed out 

quite a bit we will continue antidepression anxiety and with her primary care 

and probably outpatient neuro management can help to manage her tremor on the 

long run. 





_Debility with multiple falls: Continue physical therapy seen the patient has 

done well so far.





_Abnormal balance and gait: Combination of medication, side effect, UTI and the 

effect of her, treat underlying disease hopefully her balance will be much bett

er.  Should be little bit more conservative with her pregabalin which can be 

probably cause more side effects.





_Type 2 diabetes: Resume her Medication with NovoLog Mix 70/30 40 mg in the mo

rning 20 units in the evening continue Farxiga continue Accu-Chek with sliding 

scales coverage and apparently was on Ozempic.





_Acute kidney injury: Kidney function is back to normal.





_Hypertension: Blood pressure has been quite bit low despite being on Zestril 

and midodrine decrease Zestril to 10 mg and furthermore down to 5 if needed as 

per her midodrine will be continue only if systolic blood pressure running below

100.





_Severe neuropathy: Most likely diabetic continue Lyrica 150 mg twice a day from

last admission was claimed to be side effect of Lyrica at the time and dose was 

decreased from 3 times a day to twice a day patient was supposed to stay under 

100 mg twice a day only which probably will help her mental status and her 

balance and gait.





_Hyperlipidemia: Remain on rosuvastatin 20 mg a day continue vacation.





_Overflow incontinence: Was on Myrbetriq which will be held for now to be 

reevaluated specially to reduce any side effect.





_Chronic depression and flat affect: Has been on Lexapro along with Wellbutrin 

 mg a day.





_Chronic pain syndrome: Has been on tramadol and Lyrica will hold tramadol for 

now to reduce any side effect.





_Chronic degenerative disc disease: Again still on baclofen and pain management.





_Severe GERD/GI prophylaxis: Continue pantoprazole.





Discussion: Patient seen neuro service no further testing required at this 

point, continue physical therapy and continue to treat her UTI to clear her 

metabolic encephalopathy if she is doing well by tomorrow should be able to send

her home with slight endplate help and a quick follow-up appointment as an 

outpatient.








Objective





- Vital Signs


Vital signs: 


                                   Vital Signs











Temp  98.3 F   08/01/24 01:43


 


Pulse  76   08/01/24 01:43


 


Resp  16   08/01/24 01:43


 


BP  120/76   08/01/24 01:43


 


Pulse Ox  97   08/01/24 01:43


 


FiO2      








                                 Intake & Output











 07/31/24 07/31/24 08/01/24





 06:59 18:59 06:59


 


Intake Total   590


 


Balance   590


 


Weight  108.862 kg 108.862 kg


 


Intake:   


 


  Oral   590


 


Other:   


 


  # Voids   2


 


  # Bowel Movements   1














- Labs


CBC & Chem 7: 


                                 08/01/24 05:40





                                 08/01/24 05:40


Labs: 


                  Abnormal Lab Results - Last 24 Hours (Table)











  07/31/24 07/31/24 07/31/24 Range/Units





  16:40 16:40 21:29 


 


BUN   31 H   (7-17)  mg/dL


 


Creatinine   1.58 H   (0.52-1.04)  mg/dL


 


POC Glucose (mg/dL)    138 H  ()  mg/dL


 


Phosphorus   4.6 H   (2.5-4.5)  mg/dL


 


Magnesium   2.4 H   (1.6-2.3)  mg/dL


 


AST   47 H   (14-36)  U/L


 


Urine Appearance  Turbid H    (Clear)  


 


Urine Protein  Trace H    (Negative)  


 


Urine Glucose (UA)  4+ H    (Negative)  


 


Urine Blood  Small H    (Negative)  


 


Ur Leukocyte Esterase  Large H    (Negative)  


 


Urine RBC  70 H    (0-5)  /hpf


 


Urine WBC  >182 H    (0-5)  /hpf


 


Urine WBC Clumps  Many H    (None)  /hpf


 


Urine Bacteria  Occasional H    (None)  /hpf


 


Urine Mucus  Rare H    (None)  /hpf


 


Urine Yeast (Budding)  Many H    (None)  /hpf

## 2024-08-02 NOTE — P.PN
Subjective


Progress Note Date: 08/02/24


I am following-up with patient and she feels her tremors are drastically better 

today. 








Objective





- Vital Signs


Vital signs: 


                                   Vital Signs











Temp  98.2 F   08/02/24 07:09


 


Pulse  73   08/02/24 07:09


 


Resp  16   08/02/24 07:09


 


BP  108/66   08/02/24 07:09


 


Pulse Ox  96   08/02/24 07:09


 


FiO2      








                                 Intake & Output











 08/01/24 08/02/24 08/02/24





 18:59 06:59 18:59


 


Intake Total 950 590 


 


Balance 950 590 


 


Intake:   


 


  Intake, IV Titration 950  





  Amount   


 


    Sodium Chloride 0.9% 1, 900  





    000 ml @ 75 mls/hr IV .   





    [id number] ANUP Rx#:[id number]   


 


    cefTRIAXone 1 gm In 50  





    Sodium Chloride 0.9% 50   





    ml @ 100 mls/hr IVPB   





    Q24HR ANUP Rx#:[id number]   


 


  Oral  590 


 


Other:   


 


  # Voids 1 2 














- Exam





GENERAL: The patient is lying in bed and is not in acute distress.





NEUROLOGICAL:


Higher mental function: The patient is awake, alert, oriented to self, place and

time.  Patient is following commands.  No aphasia and no neglect.


Cranial nerves: The pupils are round, equal and reactive to light and 

accommodation.  Visual fields are full to confrontation throughout.  Extraocular

movement is intact no nystagmus is noted.  Facial sensation is normal to touch 

throughout.  The facial strength is normal throughout.  Hearing is normal 

bilaterally to hand rub.  Tongue is midline and moved side-to-side without any 

difficulty.  No dysarthria is noted.  Shoulder shrug is normal bilaterally.


Motor: Gait is normal.  The strength is 5 over 5 throughout.  Normal tone and 

bulk. No tremors today at rest or with movement.


Cerebellum: Normal finger to nose heel to chin bilaterally.


Sensation: Sensation is normal to touch throughout.


Reflexes (right/left): 2+ in uppers while lowers are 1+.


Plantars are mute bilaterally.








Some of the workup during this hospital visit consisted of:


Patient had episode of hypotensive as low as 80s over 50s once


Reviewed the rest of the lab workup


Urinalysis seems suggestive of likely urinary tract infection


TSH: 1.020


CT of the head is reported as no acute intracranial process.  Personally 

reviewed the CT and I agree with the report.








- Labs


CBC & Chem 7: 


                                 08/01/24 05:40





                                 08/01/24 05:40


Labs: 


                  Abnormal Lab Results - Last 24 Hours (Table)











  08/01/24 08/01/24 08/02/24 Range/Units





  20:14 21:19 12:11 


 


POC Glucose (mg/dL)  67 L  120 H  191 H  ()  mg/dL














Assessment and Plan


Assessment: 


This is a 59-year-old woman with history of diabetes, neuropathy, hypertension 

who has episodes of hypertension as well as hypotension who states is having 

tremor bilateral upper extremity with rest and with action for the last at least

1 year or more.  She states she is in the middle of a divorce process in the 

last 6 months.  On examination her tremor was limited since 1 addressing the 

tremor the symptoms got worse but when distracted tremor resolved.





Tremor and seems exacerbated because of her acute stress situation and medical 

condition.  She denies losing consciousness, urinary or bowel incontinence and t

his does not seem like a seizure.  Tremor is drastically better and has 

resolved.  I feel there is some functional component in addition.


Likely acute urinary tract infection


Hypotensive episode


Diabetes


Diabetic neuropathy


History of hypertension





Plan: 


I highly recommend the patient to follow-up with a neurologist as an outpatient 

for her tremor since she has been having symptoms for a year or more and I feel 

it is exacerbated with stress


Please avoid hypotensive episode.


Recommend following-up with a psychiatrist as outpatient


Otherwise will defer the rest of the medical management to primary and other 

special





There is no additional neurological work-up.  Will sign off.  Please reconsult 

if needed.





Time with Patient: Less than 30

## 2024-08-02 NOTE — P.PN
Subjective


Progress Note Date: 08/02/24





HISTORY OF PRESENT ILLNESS:


59-year-old morbidly obese with active medical history of type 2 diabetes, 

hypertension, hyperlipidemia, chronic neuropathy, chronic GERD, depression, inc

ontinence, vitamin D deficiency, chronic pain syndrome.


She was hospitalized in April for 2 days with worsening symptoms of headache 

lightheadedness presyncope like symptoms associated with significant abnormal 

balance and gait when leaning toward the left side become extremely dizzy and 

lightheaded was diagnosed with complication related to medication side effect 

and diabetic autonomic neuropathy.  With hydration and change in medication she 

felt good and was discharged home the following day she was diagnosed with acute

sinusitis at the time was fully treated.


She presented to the Emergency Department accompanied with her family was 

concerned about her safety multiple fall 1 last week and 1 yesterday as she walk

her legs gave out on her and she has no control of them she has been having 

worsening of tremor in her mouth and upper extremity much worsening lately than 

before no associated headache no recent change in medication family have been 

concerning about Parkinson disease and parkinsonism given from her last 

discharge she was supposed to see neurology and follow-up with neurology at the 

time.


Her examination lab value shows slight acute kidney injury with creatinine 1.58 

bun 31 slightly electrolyte imbalance with abnormal phosphorus normal but high 

magnesium slightly abnormal liver function test UA was severely positive this 

time with multiple clumps of WBC and large leukocyte and RBC.  Her respiratory 

panel was negative CBC did not show any abnormality with normal coagulation.  

Chest x-ray showed no acute pulmonary process CAT scan of the brain without 

contrast showed no acute intracranial process.








August 1, 2024: Patient is doing much better and that seen neurology who address

the tremor and believe the minute she is distracted the tremor is resolved this 

is not a sign of parkinsonism or Parkinson disease for the most can be 

situational tremor aggravated by stress also can be essential tremor.  She has 

urinary tract infection which can be contributing factor to the severe weakness 

and abnormal balance with fall order TSH came back normal and the matter of 

managing her hypo-/hypertension we will make an adjustment of her midodrine and 

significantly reduce her lisinopril down further to 5 mg a day and prepare for 

helping and not to have any hypotension might be causing symptoms of 

lightheadedness or dizziness.  In the meanwhile wait for physical therapy to 

help patient to ambulate walk and decide afterward whether we have a problem 

require further help or not.  The patient will remain in the hospital till 

tomorrow we will continue physical therapy keep working on her hypertension if 

she is stable tomorrow we will switch her medical management of UTI to oral and 

send her home.





8/2/2024: Patient is feeling much better she is ambulate and walk on her own no 

further fall, her metabolic encephalopathy is much better, memory has improved 

significantly and her blood pressure has been better with the combination of 

changing her lisinopril and midodrine.  Continue current management for UTI to 

improve her infection ambulate and walk him patient is asking to go home today, 

was evaluated and seen by physical therapy and Occupational Therapy and she is 

safe at this point for discharge.





REVIEW OF SYSTEMS:


CONSTITUTIONAL: Obese no acute respiratory distress


EYES: No icterus sclerae, no conjunctivitis.


EARS, NOSE, MOUTH, THROAT, and FACE: No sore throat, lymphadenopathy, carotid 

bruits or deformity.  


RESPIRATORY: No shortness of breath cough wheezes.


CARDIOVASCULAR: No CP, Palpitation, PND, Orthopnea, or angina.


GASTROINTESTINAL: No Abd pain, Nausea or vomiting, no Diarrhea or constipation, 

No GI Bleed, no distention or masses.


GENITOURINARY: History of incontinence and positive UA for UTI with mild 

symptoms.


INTEGUMENT/BREAST: Negative for any muscular injury with mild osteoarthritis..


HEMATOLOGIC/LYMPHATIC: Negative for bleed or purpura.  Chronic neuropathy.


MUSCULOSKELTAL: Mild myalgia or arthralgia.


NEURLOGICAL: Self headache, dizziness, slight resting tremor with rigid muscle 

at the time coordination is a bit off.


BEHAVIORAL/PSYCH: Negative.


ENDOCRINE: Negative.








PHYSICAL EXAMINATION:


General Appearance: Alert, cooperative, no distress, appears stated age.


Neck HEENT: Supple, no lymphadenopathy, no thyroid enlargement, no carotid br

uits.  


Lungs: Clear to auscultation without crackles or wheezes no rhonchi, no 

deformity.


Chest Wall: Chest wall normal expansion with deep inspiration no tenderness and 

no deformity was found on exam, no costochondral pain or discomfort.


Heart: Regular rate and rhythm, S1, S2 normal, no murmur, rub or gallop.


Back: Symmetric, no curvature, ROM normal, no CVA tenderness.


Abdomen: Soft, non-tender, bowel sounds active all four quadrants, no masses, no

organomegaly.  Slight discomfort lower abdominal region area.


Extremities: Extremities normal, atraumatic, no cyanosis or edema.


Pulses: 2+ and symmetric.


Skin: Skin color, texture, tugor normal, no rashes or lesions.


Neurologic: Alert oriented x3 cranial nerves II through XII intact, no motor 

deficit, sensation is normal equal bilaterally, mild tremor.





ASSESSMENT AND PLAN:





_Altered mental status: Much better so far more clear than yesterday still 

looking into tremor as a possibility of parkinsonism versus essential tremor out

circumstances coarse tremor versus medication medication side effect.  She is 

doing better so far continue to follow instruction with neuro.  





_Metabolic encephalopathy: Again this is combination of UTI along with 

medication side effect continue current management for now hoping this is anthony 

clear her metabolic encephalopathy and bring her memory function back to his 

normal.  





_Urinary tract infection with no sign of sepsis, will continue patient on 

Rocephin and upon discharge will be on cefuroxime.





_worsening tremor: Again is not sign of parkinsonism it can be essential tremor 

no acute treatment or management required for now we will continue conservative 

management keep watching her symptoms and seems like patient is stressed out 

quite a bit we will continue antidepression anxiety and with her primary care 

and probably outpatient neuro management can help to manage her tremor on the 

long run. 





_Debility with multiple falls: Continue physical therapy seen the patient has 

done well so far.





_Abnormal balance and gait: Combination of medication, side effect, UTI and the 

effect of her, treat underlying disease hopefully her balance will be much 

better.  Should be little bit more conservative with her pregabalin which can be

probably cause more side effects.





_Type 2 diabetes: Resume her Medication with NovoLog Mix 70/30 40 mg in the 

morning 20 units in the evening continue Farxiga continue Accu-Chek with sliding

scales coverage and apparently was on Ozempic.





_Acute kidney injury: Kidney function is back to normal.





_Hypertension: Blood pressure has been quite bit low despite being on Zestril 

and midodrine decrease Zestril to 10 mg and furthermore down to 5 if needed as 

per her midodrine will be continue only if systolic blood pressure running below

100.





_Severe neuropathy: Most likely diabetic continue Lyrica 150 mg twice a day from

last admission was claimed to be side effect of Lyrica at the time and dose was 

decreased from 3 times a day to twice a day patient was supposed to stay under 

100 mg twice a day only which probably will help her mental status and her 

balance and gait.





_Hyperlipidemia: Remain on rosuvastatin 20 mg a day continue vacation.





_Overflow incontinence: Was on Myrbetriq which will be held for now to be 

reevaluated specially to reduce any side effect.





_Chronic depression and flat affect: Has been on Lexapro along with Wellbutrin 

 mg a day.





_Chronic pain syndrome: Has been on tramadol and Lyrica will hold tramadol for 

now to reduce any side effect.





_Chronic degenerative disc disease: Again still on baclofen and pain management.





_Severe GERD/GI prophylaxis: Continue pantoprazole.





Discussion: Patient is doing very well will be stable to be discharged home to

day to follow-up as an outpatient by her primary care and make an arrangement to

see neurology as an outpatient for her tremor.











Objective





- Vital Signs


Vital signs: 


                                   Vital Signs











Temp  98.2 F   08/02/24 07:09


 


Pulse  73   08/02/24 07:09


 


Resp  16   08/02/24 07:09


 


BP  108/66   08/02/24 07:09


 


Pulse Ox  96   08/02/24 07:09


 


FiO2      








                                 Intake & Output











 08/01/24 08/02/24 08/02/24





 18:59 06:59 18:59


 


Intake Total 950 590 


 


Balance 950 590 


 


Intake:   


 


  Intake, IV Titration 950  





  Amount   


 


    Sodium Chloride 0.9% 1, 900  





    000 ml @ 75 mls/hr IV .   





    [id number] ANUP Rx#:[id number]   


 


    cefTRIAXone 1 gm In 50  





    Sodium Chloride 0.9% 50   





    ml @ 100 mls/hr IVPB   





    Q24HR ANUP Rx#:[id number]   


 


  Oral  590 


 


Other:   


 


  # Voids 1 2 














- Labs


CBC & Chem 7: 


                                 08/01/24 05:40





                                 08/01/24 05:40


Labs: 


                  Abnormal Lab Results - Last 24 Hours (Table)











  08/01/24 08/01/24 Range/Units





  20:14 21:19 


 


POC Glucose (mg/dL)  67 L  120 H  ()  mg/dL

## 2024-08-02 NOTE — P.DS
Providers


Date of admission: 


07/31/24 20:08





Attending physician: 


Min Villalobos





Consults: 





                                        





07/31/24 22:45


Consult Physician Routine 


   Consulting Provider: Rashel Smith


   Consult Reason/Comments: Tremor and Altered MS


   Do you want consulting provider notified?: Yes, Notify in am











Primary care physician: 


[facility] [place] Course: 





HISTORY OF PRESENT ILLNESS:


59-year-old morbidly obese with active medical history of type 2 diabetes, 

hypertension, hyperlipidemia, chronic neuropathy, chronic GERD, depression, 

incontinence, vitamin D deficiency, chronic pain syndrome.


She was hospitalized in April for 2 days with worsening symptoms of headache 

lightheadedness presyncope like symptoms associated with significant abnormal 

balance and gait when leaning toward the left side become extremely dizzy and 

lightheaded was diagnosed with complication related to medication side effect 

and diabetic autonomic neuropathy.  With hydration and change in medication she 

felt good and was discharged home the following day she was diagnosed with acute

sinusitis at the time was fully treated.


She presented to the Emergency Department accompanied with her family was 

concerned about her safety multiple fall 1 last week and 1 yesterday as she walk

her legs gave out on her and she has no control of them she has been having 

worsening of tremor in her mouth and upper extremity much worsening lately than 

before no associated headache no recent change in medication family have been 

concerning about Parkinson disease and parkinsonism given from her last 

discharge she was supposed to see neurology and follow-up with neurology at the 

time.


Her examination lab value shows slight acute kidney injury with creatinine 1.58 

bun 31 slightly electrolyte imbalance with abnormal phosphorus normal but high 

magnesium slightly abnormal liver function test UA was severely positive this 

time with multiple clumps of WBC and large leukocyte and RBC.  Her respiratory 

panel was negative CBC did not show any abnormality with normal coagulation.  

Chest x-ray showed no acute pulmonary process CAT scan of the brain without 

contrast showed no acute intracranial process.








August 1, 2024: Patient is doing much better and that seen neurology who address

the tremor and believe the minute she is distracted the tremor is resolved this 

is not a sign of parkinsonism or Parkinson disease for the most can be 

situational tremor aggravated by stress also can be essential tremor.  She has 

urinary tract infection which can be contributing factor to the severe weakness 

and abnormal balance with fall order TSH came back normal and the matter of 

managing her hypo-/hypertension we will make an adjustment of her midodrine and 

significantly reduce her lisinopril down further to 5 mg a day and prepare for 

helping and not to have any hypotension might be causing symptoms of 

lightheadedness or dizziness.  In the meanwhile wait for physical therapy to 

help patient to ambulate walk and decide afterward whether we have a problem 

require further help or not.  The patient will remain in the hospital till 

tomorrow we will continue physical therapy keep working on her hypertension if 

she is stable tomorrow we will switch her medical management of UTI to oral and 

send her home.





8/2/2024: Patient is feeling much better she is ambulate and walk on her own no 

further fall, her metabolic encephalopathy is much better, memory has improved 

significantly and her blood pressure has been better with the combination of 

changing her lisinopril and midodrine.  Continue current management for UTI to 

improve her infection ambulate and walk him patient is asking to go home today, 

was evaluated and seen by physical therapy and Occupational Therapy and she is 

safe at this point for discharge.





REVIEW OF SYSTEMS:


CONSTITUTIONAL: Obese no acute respiratory distress


EYES: No icterus sclerae, no conjunctivitis.


EARS, NOSE, MOUTH, THROAT, and FACE: No sore throat, lymphadenopathy, carotid 

bruits or deformity.  


RESPIRATORY: No shortness of breath cough wheezes.


CARDIOVASCULAR: No CP, Palpitation, PND, Orthopnea, or angina.


GASTROINTESTINAL: No Abd pain, Nausea or vomiting, no Diarrhea or constipation, 

No GI Bleed, no distention or masses.


GENITOURINARY: History of incontinence and positive UA for UTI with mild 

symptoms.


INTEGUMENT/BREAST: Negative for any muscular injury with mild osteoarthritis..


HEMATOLOGIC/LYMPHATIC: Negative for bleed or purpura.  Chronic neuropathy.


MUSCULOSKELTAL: Mild myalgia or arthralgia.


NEURLOGICAL: Self headache, dizziness, slight resting tremor with rigid muscle 

at the time coordination is a bit off.


BEHAVIORAL/PSYCH: Negative.


ENDOCRINE: Negative.








PHYSICAL EXAMINATION:


General Appearance: Alert, cooperative, no distress, appears stated age.


Neck HEENT: Supple, no lymphadenopathy, no thyroid enlargement, no carotid 

bruits.  


Lungs: Clear to auscultation without crackles or wheezes no rhonchi, no 

deformity.


Chest Wall: Chest wall normal expansion with deep inspiration no tenderness and 

no deformity was found on exam, no costochondral pain or discomfort.


Heart: Regular rate and rhythm, S1, S2 normal, no murmur, rub or gallop.


Back: Symmetric, no curvature, ROM normal, no CVA tenderness.


Abdomen: Soft, non-tender, bowel sounds active all four quadrants, no masses, no

organomegaly.  Slight discomfort lower abdominal region area.


Extremities: Extremities normal, atraumatic, no cyanosis or edema.


Pulses: 2+ and symmetric.


Skin: Skin color, texture, tugor normal, no rashes or lesions.


Neurologic: Alert oriented x3 cranial nerves II through XII intact, no motor 

deficit, sensation is normal equal bilaterally, mild tremor.





ASSESSMENT AND PLAN:





_Altered mental status: Much better so far more clear than yesterday still 

looking into tremor as a possibility of parkinsonism versus essential tremor out

circumstances coarse tremor versus medication medication side effect.  She is 

doing better so far continue to follow instruction with neuro.  





_Metabolic encephalopathy: Again this is combination of UTI along with 

medication side effect continue current management for now hoping this is anthony 

clear her metabolic encephalopathy and bring her memory function back to his 

normal.  





_Urinary tract infection with no sign of sepsis, will continue patient on 

Rocephin and upon discharge will be on cefuroxime.





_worsening tremor: Again is not sign of parkinsonism it can be essential tremor 

no acute treatment or management required for now we will continue conservative 

management keep watching her symptoms and seems like patient is stressed out 

quite a bit we will continue antidepression anxiety and with her primary care 

and probably outpatient neuro management can help to manage her tremor on the 

long run. 





_Debility with multiple falls: Continue physical therapy seen the patient has 

done well so far.





_Abnormal balance and gait: Combination of medication, side effect, UTI and the 

effect of her, treat underlying disease hopefully her balance will be much 

better.  Should be little bit more conservative with her pregabalin which can be

probably cause more side effects.





_Type 2 diabetes: Resume her Medication with NovoLog Mix 70/30 40 mg in the 

morning 20 units in the evening continue Farxiga continue Accu-Chek with sliding

scales coverage and apparently was on Ozempic.





_Acute kidney injury: Kidney function is back to normal.





_Hypertension: Blood pressure has been quite bit low despite being on Zestril 

and midodrine decrease Zestril to 10 mg and furthermore down to 5 if needed as 

per her midodrine will be continue only if systolic blood pressure running below

100.





_Severe neuropathy: Most likely diabetic continue Lyrica 150 mg twice a day from

last admission was claimed to be side effect of Lyrica at the time and dose was 

decreased from 3 times a day to twice a day patient was supposed to stay under 

100 mg twice a day only which probably will help her mental status and her 

balance and gait.





_Hyperlipidemia: Remain on rosuvastatin 20 mg a day continue vacation.





_Overflow incontinence: Was on Myrbetriq which will be held for now to be 

reevaluated specially to reduce any side effect.





_Chronic depression and flat affect: Has been on Lexapro along with Wellbutrin 

 mg a day.





_Chronic pain syndrome: Has been on tramadol and Lyrica will hold tramadol for 

now to reduce any side effect.





_Chronic degenerative disc disease: Again still on baclofen and pain management.





_Severe GERD/GI prophylaxis: Continue pantoprazole.





Discussion: Patient is doing very well will be stable to be discharged home 

today to follow-up as an outpatient by her primary care and make an arrangement 

to see neurology as an outpatient for her tremor.





Hospital course:


Patient admitted to the hospital on July 31, 2024 for severe episode of altered 

mental status with confusion severe lightheadedness and dizziness with severe 

abnormal balance gait and tremor, finding consistent with overmedicated with 

hypo-/hypertension management also overmedicated with neuropathy management.


Also patient found to have UTI which ended up being claim for metabolic 

encephalopathy causing some of her problem.  Patient also found to have acute 

kidney injury with creatinine at 1.58 on admission with better hydration and 

management improved and creatinine dropped down to 1.29 continue hydration.  Her

urine culture is still pending currently but she has done very well with 

Rocephin which will be continue as an outpatient treatment with cefuroxime for 

the next 5 days.


Patient was seen and evaluated by physical therapy and has done very well able 

to ambulate and walk safely.


Also patient found to have quite a bit of problem with hypo-/hypertension with 

the medication she is doing midodrine and lisinopril most of her lisinopril is 

done for diabetic nephropathy which the dose will be decreased to 5 mg from 20 

mg and midodrine will be changed to 5 mg twice a day to keep her blood pressure 

on the softer side around 1 10-1 20 not to have it too high or too low.  For the

purpose  has provided patient with help to get blood pressure 

monitor at home for further help to manage her medical problem as well.  The 

patient is stable for discharge home today she is anthony follow-up with her 

primary care shortly this time also for follow-up with neurology service as an 

outpatient for further management of her tremor and gait.





Time spent on patient discharge was over 35 minutes.


Patient Condition at Discharge: Stable





Plan - Discharge Summary


Discharge Rx Participant: No


New Discharge Prescriptions: 


New


   Midodrine [ProAmatine] 5 mg PO BID-W/MEALS #60 tab


   lisinopriL [Zestril] 5 mg PO DAILY #30 tab


   Cefuroxime [Ceftin] 250 mg PO BID 5 Days #10 tab





Continue


   Montelukast [Singulair] 10 mg PO DAILY


   Mirabegron [Myrbetriq] 50 mg PO DAILY


   Aspirin EC [Ecotrin Low Dose] 81 mg PO HS


   Empagliflozin [Jardiance] 10 mg PO DAILY


   Semaglutide [Ozempic] 1 mg SQ TU


   Omeprazole 40 mg PO DAILY


   Escitalopram [Lexapro] 20 mg PO DAILY


   Loratadine [Claritin] 10 mg PO DAILY #30 tab


   Pregabalin [Lyrica] 150 mg PO BID #0


   HYDROcodone/APAP 5-325MG [Norco 5-325] 1 tab PO QID PRN


     PRN Reason: Pain


   buPROPion XL [Wellbutrin XL] 150 mg PO DAILY


   Ergocalciferol [Vitamin D2 (1250 Mcg = 51394 Iu)] 1,250 mcg PO Q14D


   Rosuvastatin [Crestor] 20 mg PO HS


   Insuln Asp Prt/Insulin Aspart [NovoLOG MIX 70-30 VIAL] 40 unit SQ AC-BRKFST


   Insuln Asp Prt/Insulin Aspart [NovoLOG MIX 70-30 VIAL] 20 unit SQ AC-SUPPER


   Baclofen [Lioresal] 5 mg PO BID PRN


     PRN Reason: Muscle Spasm





Discontinued


   traMADol HCL 50 mg PO Q6H PRN


     PRN Reason: Pain


   lisinopriL [Zestril] 20 mg PO DAILY


   Midodrine HCl [ProAmatine] 10 mg PO DAILY


Discharge Medication List





Montelukast [Singulair] 10 mg PO DAILY 02/13/18 [History]


Mirabegron [Myrbetriq] 50 mg PO DAILY 09/02/20 [History]


Aspirin EC [Ecotrin Low Dose] 81 mg PO HS 03/08/21 [History]


Empagliflozin [Jardiance] 10 mg PO DAILY 09/28/22 [History]


Ergocalciferol [Vitamin D2 (1250 Mcg = 27475 Iu)] 1,250 mcg PO Q14D 09/28/22 

[History]


Escitalopram [Lexapro] 20 mg PO DAILY 02/08/24 [History]


Insuln Asp Prt/Insulin Aspart [NovoLOG MIX 70-30 VIAL] 20 unit SQ AC-SUPPER 

02/08/24 [History]


Insuln Asp Prt/Insulin Aspart [NovoLOG MIX 70-30 VIAL] 40 unit SQ AC-BRKFST 

02/08/24 [History]


Omeprazole 40 mg PO DAILY 02/08/24 [History]


Rosuvastatin [Crestor] 20 mg PO HS 02/08/24 [History]


Semaglutide [Ozempic] 1 mg SQ TU 02/08/24 [History]


Loratadine [Claritin] 10 mg PO DAILY #30 tab 05/02/24 [Rx]


Pregabalin [Lyrica] 150 mg PO BID #0 05/02/24 [Rx]


Baclofen [Lioresal] 5 mg PO BID PRN 07/31/24 [History]


HYDROcodone/APAP 5-325MG [Norco 5-325] 1 tab PO QID PRN 07/31/24 [History]


buPROPion XL [Wellbutrin XL] 150 mg PO DAILY 07/31/24 [History]


Cefuroxime [Ceftin] 250 mg PO BID 5 Days #10 tab 08/02/24 [Rx]


Midodrine [ProAmatine] 5 mg PO BID-W/MEALS #60 tab 08/02/24 [Rx]


lisinopriL [Zestril] 5 mg PO DAILY #30 tab 08/02/24 [Rx]








Follow up Appointment(s)/Referral(s): 


Emily Capps MD [Primary Care Provider] - 1-2 days


Cici Ibrahim MD [REFERRING] - 1 Week


Discharge Disposition: HOME SELF-CARE

## 2024-08-22 ENCOUNTER — HOSPITAL ENCOUNTER (EMERGENCY)
Dept: HOSPITAL 47 - EC | Age: 59
Discharge: HOME | End: 2024-08-22
Payer: MEDICARE

## 2024-08-22 PROCEDURE — 29125 APPL SHORT ARM SPLINT STATIC: CPT

## 2024-08-22 PROCEDURE — 99283 EMERGENCY DEPT VISIT LOW MDM: CPT

## 2024-08-22 PROCEDURE — 96372 THER/PROPH/DIAG INJ SC/IM: CPT

## 2024-09-17 NOTE — XR
synapse default - Radiology Report 

Patient:   Nelly April, L   Ordering Physician:   Unknown, Unknown   

ID:   [id number]   Phone, Pager:   Phone: N/A Pager: N/A   

:   1965       

Age/Gender:   59Y, F   Primary Location:   N/A   

Procedure:   XR hand complete LT   Study Date:   2024 5:02:08 PM   

Accession #:   [id number]   Order #:   N/A   

Report Status:   Unknown       

 

EXAMINATION TYPE: XR hand left and left wrist

 

DATE OF EXAM: 2024

 

CLINICAL HISTORY: pain

 

TECHNIQUE:  Frontal, lateral and oblique images of the left hand and left wrist are obtained.

 

COMPARISON: None.

 

FINDINGS:  There is no acute fracture/dislocation evident. The joint spaces appear within normal limi
ts.  The overlying soft tissue appears unremarkable.

 

IMPRESSION: 

 

There is no acute fracture or dislocation

 

ICD 10 NO FRACTURE, INITIAL EVALUATION